# Patient Record
Sex: MALE | Race: WHITE | Employment: UNEMPLOYED | RURAL
[De-identification: names, ages, dates, MRNs, and addresses within clinical notes are randomized per-mention and may not be internally consistent; named-entity substitution may affect disease eponyms.]

---

## 2017-01-25 RX ORDER — CETIRIZINE HCL 10 MG
10 TABLET ORAL DAILY
Qty: 90 TAB | Refills: 1 | Status: SHIPPED | OUTPATIENT
Start: 2017-01-25

## 2017-03-08 RX ORDER — PROMETHAZINE HYDROCHLORIDE 25 MG/1
TABLET ORAL
Qty: 30 TAB | Refills: 2 | Status: SHIPPED | OUTPATIENT
Start: 2017-03-08 | End: 2017-06-01 | Stop reason: SDUPTHER

## 2017-06-01 RX ORDER — PROMETHAZINE HYDROCHLORIDE 25 MG/1
TABLET ORAL
Qty: 30 TAB | Refills: 2 | Status: SHIPPED | OUTPATIENT
Start: 2017-06-01 | End: 2017-08-24 | Stop reason: SDUPTHER

## 2017-08-21 RX ORDER — LEVOTHYROXINE SODIUM 100 UG/1
100 TABLET ORAL
Qty: 14 TAB | Refills: 0 | Status: SHIPPED | OUTPATIENT
Start: 2017-08-21 | End: 2017-09-01 | Stop reason: SDUPTHER

## 2017-08-25 ENCOUNTER — OFFICE VISIT (OUTPATIENT)
Dept: FAMILY MEDICINE CLINIC | Age: 57
End: 2017-08-25

## 2017-08-25 VITALS
WEIGHT: 150 LBS | DIASTOLIC BLOOD PRESSURE: 69 MMHG | BODY MASS INDEX: 23.54 KG/M2 | SYSTOLIC BLOOD PRESSURE: 131 MMHG | HEIGHT: 67 IN | OXYGEN SATURATION: 100 % | TEMPERATURE: 97.7 F | HEART RATE: 49 BPM | RESPIRATION RATE: 18 BRPM

## 2017-08-25 DIAGNOSIS — R79.89 HIGH SERUM LOW DENSITY LIPOPROTEIN (LDL) CHOLESTEROL: ICD-10-CM

## 2017-08-25 DIAGNOSIS — E03.9 ACQUIRED HYPOTHYROIDISM: Primary | ICD-10-CM

## 2017-08-25 DIAGNOSIS — R73.03 PREDIABETES: ICD-10-CM

## 2017-08-25 DIAGNOSIS — R00.1 BRADYCARDIA: ICD-10-CM

## 2017-08-25 NOTE — PROGRESS NOTES
Subjective:      Melissa Mcneill is a 62 y.o. male here for hypothyroidism follow up and labs. Hypothyroidism: currently on levothyroxine 100 mcg daily before breakfast. Reports that he feels \"tired all the time\". Health Maintenance:  · Colonoscopy: has had previously   · Hepatitis C screening (born between [birth year] and 1965): 3/10/16   · Tetanus: unknown   · Pneumovax: has not had   · PSA (males): 2015, normal   · Influenza vaccine: recommend that he receive this flu season      Current Outpatient Prescriptions   Medication Sig Dispense Refill    levothyroxine (SYNTHROID) 100 mcg tablet Take 1 Tab by mouth Daily (before breakfast). Appointment required. 14 Tab 0    promethazine (PHENERGAN) 25 mg tablet TAKE 1 TABLET BY MOUTH NIGHTLY. 30 Tab 2    cetirizine (ZYRTEC) 10 mg tablet Take 1 Tab by mouth daily. 90 Tab 1    omega-3 fatty acids-vitamin e (FISH OIL) 1,000 mg cap Take 1 Cap by mouth daily.  90 Cap 3       No Known Allergies      Past Medical History:   Diagnosis Date    GERD (gastroesophageal reflux disease)     Thyroid disease        Social History   Substance Use Topics    Smoking status: Former Smoker     Quit date: 3/2/1985    Smokeless tobacco: Never Used    Alcohol use No        Review of Systems  Constitutional: negative for fevers and chills  Eyes: negative for visual disturbance and irritation  Ears, nose, mouth, throat, and face: negative for nasal congestion and sore throat  Respiratory: negative for cough, sputum or dyspnea on exertion  Cardiovascular: negative for chest pain, chest pressure/discomfort, palpitations, irregular heart beats, lower extremity edema  Gastrointestinal: positive for nausea, negative for vomiting, change in bowel habits and abdominal pain  Genitourinary:negative for frequency, dysuria and hematuria  Musculoskeletal:negative for myalgias and arthralgias  Neurological: negative for headaches, dizziness and paresthesia     Objective:     Visit Vitals    /69 (BP 1 Location: Right arm, BP Patient Position: Sitting)    Pulse (!) 49    Temp 97.7 °F (36.5 °C) (Oral)    Resp 18    Ht 5' 7.25\" (1.708 m)    Wt 150 lb (68 kg)    SpO2 100%    BMI 23.32 kg/m2      General appearance - alert, well appearing, and in no distress  Eyes - pupils equal and reactive, extraocular eye movements intact, sclera anicteric  Oropharyngx - mucous membranes moist, pharynx normal without lesions  Neck - supple, no significant adenopathy, thyroid exam: thyroid is normal in size without nodules or tenderness  Chest - clear to auscultation, no wheezes, rales or rhonchi, symmetric air entry, no tachypnea, retractions or cyanosis  Heart - bradycardic, regular rhythm, normal S1, S2, no murmurs, rubs, clicks or gallops  Neurological - alert, oriented, normal speech, no focal findings or movement disorder noted  Extremities - peripheral pulses normal, no pedal edema, no clubbing or cyanosis    Assessment/Plan:   Alexander Sahu is a 62 y.o. male seen for:     1. Acquired hypothyroidism: check TSH, continue with current dose of levothyroxine at this time.   - TSH 3RD GENERATION    2. Bradycardia: asymptomatic. EKG with sinus bradycardia. Monitor.    - AMB POC EKG ROUTINE W/ 12 LEADS, INTER & REP    3. Prediabetes: check A1c.   - HEMOGLOBIN A1C WITH EAG    4. High serum low density lipoprotein (LDL) cholesterol: on fish oil supplement. Check lipid panel.   - LIPID PANEL    I have discussed the diagnosis with the patient and the intended plan as seen in the above orders. The patient has received an after-visit summary and questions were answered concerning future plans. I have discussed medication side effects and warnings with the patient as well. Patient verbalizes understanding of plan of care and denies further questions or concerns at this time. Informed patient to return to the office if symptoms worsen or if new symptoms arise.     Follow-up Disposition:  Return in about 6 months (around 2/25/2018) for follow up or sooner as needed.

## 2017-08-25 NOTE — MR AVS SNAPSHOT
Visit Information Date & Time Provider Department Dept. Phone Encounter #  
 8/25/2017  8:00 AM Zahra Mendez Bryant Tani [phone] [id number] Follow-up Instructions Return in about 6 months (around 2/25/2018) for follow up or sooner as needed. Upcoming Health Maintenance Date Due FOBT Q 1 YEAR AGE 50-75 5/17/2010 INFLUENZA AGE 9 TO ADULT 9/1/2017* DTaP/Tdap/Td series (2 - Td) 8/25/2027 *Topic was postponed. The date shown is not the original due date. Allergies as of 8/25/2017  Review Complete On: 8/25/2017 By: Zahra Mendez MD  
 No Known Allergies Current Immunizations  Never Reviewed No immunizations on file. Not reviewed this visit You Were Diagnosed With   
  
 Codes Comments Acquired hypothyroidism    -  Primary ICD-10-CM: E03.9 ICD-9-CM: 244.9 Bradycardia     ICD-10-CM: R00.1 ICD-9-CM: 427.89 Prediabetes     ICD-10-CM: R73.03 
ICD-9-CM: 790.29 High serum low density lipoprotein (LDL) cholesterol     ICD-10-CM: R79.89 ICD-9-CM: 790.99 Vitals BP Pulse Temp Resp Height(growth percentile) Weight(growth percentile) 131/69 (BP 1 Location: Right arm, BP Patient Position: Sitting) (!) 49 97.7 °F (36.5 °C) (Oral) 18 5' 7.25\" (1.708 m) 150 lb (68 kg) SpO2 BMI Smoking Status 100% 23.32 kg/m2 Former Smoker BMI and BSA Data Body Mass Index Body Surface Area  
 23.32 kg/m 2 1.8 m 2 Preferred Pharmacy Pharmacy Name Phone [id number] [place] [address] [phone] Your Updated Medication List  
  
   
This list is accurate as of: 8/25/17  8:40 AM.  Always use your most recent med list.  
  
  
  
  
 cetirizine 10 mg tablet Commonly known as:  ZyrTEC Take 1 Tab by mouth daily. levothyroxine 100 mcg tablet Commonly known as:  SYNTHROID Take 1 Tab by mouth Daily (before breakfast). Appointment required. omega-3 fatty acids-vitamin e 1,000 mg Cap Commonly known as:  FISH OIL Take 1 Cap by mouth daily. promethazine 25 mg tablet Commonly known as:  PHENERGAN  
TAKE 1 TABLET BY MOUTH NIGHTLY. We Performed the Following AMB POC EKG ROUTINE W/ 12 LEADS, INTER & REP [52521 CPT(R)] HEMOGLOBIN A1C WITH EAG [38564 CPT(R)] LIPID PANEL [61863 CPT(R)] TSH 3RD GENERATION [76331 CPT(R)] Follow-up Instructions Return in about 6 months (around 2/25/2018) for follow up or sooner as needed. Patient Instructions A Healthy Lifestyle: Care Instructions Your Care Instructions A healthy lifestyle can help you feel good, stay at a healthy weight, and have plenty of energy for both work and play. A healthy lifestyle is something you can share with your whole family. A healthy lifestyle also can lower your risk for serious health problems, such as high blood pressure, heart disease, and diabetes. You can follow a few steps listed below to improve your health and the health of your family. Follow-up care is a key part of your treatment and safety. Be sure to make and go to all appointments, and call your doctor if you are having problems. Its also a good idea to know your test results and keep a list of the medicines you take. How can you care for yourself at home? · Do not eat too much sugar, fat, or fast foods. You can still have dessert and treats now and then. The goal is moderation. · Start small to improve your eating habits. Pay attention to portion sizes, drink less juice and soda pop, and eat more fruits and vegetables. ¨ Eat a healthy amount of food. A 3-ounce serving of meat, for example, is about the size of a deck of cards. Fill the rest of your plate with vegetables and whole grains. ¨ Limit the amount of soda and sports drinks you have every day. Drink more water when you are thirsty. ¨ Eat at least 5 servings of fruits and vegetables every day. It may seem like a lot, but it is not hard to reach this goal. A serving or helping is 1 piece of fruit, 1 cup of vegetables, or 2 cups of leafy, raw vegetables. Have an apple or some carrot sticks as an afternoon snack instead of a candy bar. Try to have fruits and/or vegetables at every meal. 
· Make exercise part of your daily routine. You may want to start with simple activities, such as walking, bicycling, or slow swimming. Try to be active 30 to 60 minutes every day. You do not need to do all 30 to 60 minutes all at once. For example, you can exercise 3 times a day for 10 or 20 minutes. Moderate exercise is safe for most people, but it is always a good idea to talk to your doctor before starting an exercise program. 
· Keep moving. [employer] the lawn, work in the garden, or tomoguides. Take the stairs instead of the elevator at work. · If you smoke, quit. People who smoke have an increased risk for heart attack, stroke, cancer, and other lung illnesses. Quitting is hard, but there are ways to boost your chance of quitting tobacco for good. ¨ Use nicotine gum, patches, or lozenges. ¨ Ask your doctor about stop-smoking programs and medicines. ¨ Keep trying. In addition to reducing your risk of diseases in the future, you will notice some benefits soon after you stop using tobacco. If you have shortness of breath or asthma symptoms, they will likely get better within a few weeks after you quit. · Limit how much alcohol you drink. Moderate amounts of alcohol (up to 2 drinks a day for men, 1 drink a day for women) are okay. But drinking too much can lead to liver problems, high blood pressure, and other health problems. Family health If you have a family, there are many things you can do together to improve your health. · Eat meals together as a family as often as possible. · Eat healthy foods.  This includes fruits, vegetables, lean meats and dairy, and whole grains. · Include your family in your fitness plan. Most people think of activities such as jogging or tennis as the way to fitness, but there are many ways you and your family can be more active. Anything that makes you breathe hard and gets your heart pumping is exercise. Here are some tips: 
¨ Walk to do errands or to take your child to school or the bus. ¨ Go for a family bike ride after dinner instead of watching TV. Where can you learn more? Go to http://alondra-dawood.info/. Enter J674 in the search box to learn more about \"A Healthy Lifestyle: Care Instructions. \" Current as of: July 26, 2016 Content Version: 11.3 © 4277-8527 [employer] Care instructions adapted under license by [employer] (which disclaims liability or warranty for this information). If you have questions about a medical condition or this instruction, always ask your healthcare professional. [place] [address] any warranty or liability for your use of this information. Introducing [facility]! Amita Sadler introduces [employer] patient portal. Now you can access parts of your medical record, email your doctor's office, and request medication refills online. 1. In your internet browser, go to [email or web address] 2. Click on the First Time User? Click Here link in the Sign In box. You will see the New Member Sign Up page. 3. Enter your CopperKey Access Code exactly as it appears below. You will not need to use this code after youve completed the sign-up process. If you do not sign up before the expiration date, you must request a new code. · [employer] Access Code: [email or web address] Expires: 11/23/2017  8:39 AM 
 
4. Enter the last four digits of your Social Security Number (xxxx) and Date of Birth (mm/dd/yyyy) as indicated and click Submit. You will be taken to the next sign-up page. 5. Create a Memorado ID. This will be your Memorado login ID and cannot be changed, so think of one that is secure and easy to remember. 6. Create a Memorado password. You can change your password at any time. 7. Enter your Password Reset Question and Answer. This can be used at a later time if you forget your password. 8. Enter your e-mail address. You will receive e-mail notification when new information is available in [address] 9. Click Sign Up. You can now view and download portions of your medical record. 10. Click the Download Summary menu link to download a portable copy of your medical information. If you have questions, please visit the Frequently Asked Questions section of the Memorado website. Remember, [employer]orado is NOT to be used for urgent needs. For medical emergencies, dial 911. Now available from your iPhone and Android! Please provide this summary of care documentation to your next provider. Your primary care clinician is listed as Keren Noel. If you have any questions after today's visit, please call [phone].

## 2017-08-25 NOTE — PROGRESS NOTES
Identified pt with two pt identifiers(name and ). Chief Complaint   Patient presents with    Annual Wellness Visit    Labs     fasting for [address] Maintenance Due   Topic    DTaP/Tdap/Td series (1 - Tdap)    FOBT Q 1 YEAR AGE 50-75     INFLUENZA AGE 9 TO ADULT        Wt Readings from Last 3 Encounters:   17 150 lb (68 kg)   16 158 lb (71.7 kg)   16 154 lb (69.9 kg)     Temp Readings from Last 3 Encounters:   17 97.7 °F (36.5 °C) (Oral)   16 98 °F (36.7 °C) (Oral)   16 98.3 °F (36.8 °C) (Oral)     BP Readings from Last 3 Encounters:   17 131/69   16 128/72   16 137/76     Pulse Readings from Last 3 Encounters:   17 (!) 49   16 (!) 56   16 60         Learning Assessment:  :     Learning Assessment 3/8/2016   PRIMARY LEARNER Patient   HIGHEST LEVEL OF EDUCATION - PRIMARY LEARNER  GRADUATED HIGH SCHOOL OR GED   BARRIERS PRIMARY LEARNER NONE   CO-LEARNER CAREGIVER No   PRIMARY LANGUAGE ENGLISH   LEARNER PREFERENCE PRIMARY DEMONSTRATION   ANSWERED BY patient   RELATIONSHIP SELF       Depression Screening:  :     PHQ over the last two weeks 2016   Little interest or pleasure in doing things Not at all   Feeling down, depressed or hopeless Not at all   Total Score PHQ 2 0       Fall Risk Assessment:  :     No flowsheet data found. Abuse Screening:  :     No flowsheet data found. Coordination of Care Questionnaire:  :     1) Have you been to an emergency room, urgent care clinic since your last visit? [facility] [address] [place] [address] [facility]  Hospitalized since your last visit? no             2) Have you seen or consulted any other health care providers outside of [address] [place] [address] since your last visit? no  (Include any pap smears or colon screenings in this section.)    3) Do you have an Advance Directive on file? no  Are you interested in receiving information about Advance Directives?  no    Reviewed record in preparation for visit and have obtained necessary documentation. Medication reconciliation up to date and corrected with patient at this time.

## 2017-08-26 LAB
CHOLEST SERPL-MCNC: 196 MG/DL (ref 100–199)
EST. AVERAGE GLUCOSE BLD GHB EST-MCNC: 117 MG/DL
HBA1C MFR BLD: 5.7 % (ref 4.8–5.6)
HDLC SERPL-MCNC: 72 MG/DL
INTERPRETATION, 910389: NORMAL
LDLC SERPL CALC-MCNC: 112 MG/DL (ref 0–99)
TRIGL SERPL-MCNC: 59 MG/DL (ref 0–149)
TSH SERPL DL<=0.005 MIU/L-ACNC: 3.35 UIU/ML (ref 0.45–4.5)
VLDLC SERPL CALC-MCNC: 12 MG/DL (ref 5–40)

## 2017-08-28 RX ORDER — PROMETHAZINE HYDROCHLORIDE 25 MG/1
TABLET ORAL
Qty: 30 TAB | Refills: 2 | Status: SHIPPED | OUTPATIENT
Start: 2017-08-28 | End: 2017-08-30 | Stop reason: SDUPTHER

## 2017-08-28 NOTE — TELEPHONE ENCOUNTER
[facility] at [address] [place] [address] is calling to check on status of refill request.

## 2017-09-01 RX ORDER — PROMETHAZINE HYDROCHLORIDE 25 MG/1
TABLET ORAL
Qty: 30 TAB | Refills: 5 | Status: SHIPPED | OUTPATIENT
Start: 2017-09-01 | End: 2018-05-27 | Stop reason: SDUPTHER

## 2017-09-01 RX ORDER — LEVOTHYROXINE SODIUM 100 UG/1
100 TABLET ORAL
Qty: 30 TAB | Refills: 5 | Status: SHIPPED | OUTPATIENT
Start: 2017-09-01 | End: 2018-01-02 | Stop reason: SDUPTHER

## 2017-09-01 NOTE — TELEPHONE ENCOUNTER
Patient called and labs discussed. No change in current medications. Follow up in 6 months. Patient verbalizes understanding. Requested Prescriptions     Signed Prescriptions Disp Refills    promethazine (PHENERGAN) 25 mg tablet 30 Tab 5     Sig: TAKE 1 TABLET BY MOUTH NIGHTLY. Authorizing Provider: Alexus Sinclair levothyroxine (SYNTHROID) 100 mcg tablet 30 Tab 5     Sig: Take 1 Tab by mouth Daily (before breakfast).      Authorizing Provider: Jay Lopez

## 2017-09-08 ENCOUNTER — OFFICE VISIT (OUTPATIENT)
Dept: FAMILY MEDICINE CLINIC | Age: 57
End: 2017-09-08

## 2017-09-08 VITALS
SYSTOLIC BLOOD PRESSURE: 142 MMHG | DIASTOLIC BLOOD PRESSURE: 72 MMHG | HEIGHT: 67 IN | WEIGHT: 153 LBS | HEART RATE: 55 BPM | BODY MASS INDEX: 24.01 KG/M2 | TEMPERATURE: 97.7 F | RESPIRATION RATE: 18 BRPM | OXYGEN SATURATION: 98 %

## 2017-09-08 DIAGNOSIS — H66.001 ACUTE SUPPURATIVE OTITIS MEDIA OF RIGHT EAR WITHOUT SPONTANEOUS RUPTURE OF TYMPANIC MEMBRANE, RECURRENCE NOT SPECIFIED: Primary | ICD-10-CM

## 2017-09-08 DIAGNOSIS — R03.0 ELEVATED BLOOD PRESSURE READING IN OFFICE WITHOUT DIAGNOSIS OF HYPERTENSION: ICD-10-CM

## 2017-09-08 RX ORDER — AMOXICILLIN 875 MG/1
875 TABLET, FILM COATED ORAL 2 TIMES DAILY
Qty: 20 TAB | Refills: 0 | Status: SHIPPED | OUTPATIENT
Start: 2017-09-08 | End: 2017-09-11 | Stop reason: ALTCHOICE

## 2017-09-08 NOTE — PATIENT INSTRUCTIONS
Ear Infection (Otitis Media): Care Instructions  Your Care Instructions    An ear infection may start with a cold and affect the middle ear (otitis media). It can hurt a lot. Most ear infections clear up on their own in a couple of days. Most often you will not need antibiotics. This is because many ear infections are caused by a virus. Antibiotics don't work against a virus. Regular doses of pain medicines are the best way to reduce your fever and help you feel better. Follow-up care is a key part of your treatment and safety. Be sure to make and go to all appointments, and call your doctor if you are having problems. It's also a good idea to know your test results and keep a list of the medicines you take. How can you care for yourself at home? · Take pain medicines exactly as directed. ¨ If the doctor gave you a prescription medicine for pain, take it as prescribed. ¨ If you are not taking a prescription pain medicine, take an over-the-counter medicine, such as acetaminophen (Tylenol), ibuprofen (Advil, Motrin), or naproxen (Aleve). Read and follow all instructions on the label. ¨ Do not take two or more pain medicines at the same time unless the doctor told you to. Many pain medicines have acetaminophen, which is Tylenol. Too much acetaminophen (Tylenol) can be harmful. · Plan to take a full dose of pain reliever before bedtime. Getting enough sleep will help you get better. · Try a warm, moist washcloth on the ear. It may help relieve pain. · If your doctor prescribed antibiotics, take them as directed. Do not stop taking them just because you feel better. You need to take the full course of antibiotics. When should you call for help? Call your doctor now or seek immediate medical care if:  · You have new or increasing ear pain. · You have new or increasing pus or blood draining from your ear. · You have a fever with a stiff neck or a severe headache.   Watch closely for changes in your health, and be sure to contact your doctor if:  · You have new or worse symptoms. · You are not getting better after taking an antibiotic for 2 days. Where can you learn more? Go to http://alondra-dawood.info/. Enter N845 in the search box to learn more about \"Ear Infection (Otitis Media): Care Instructions. \"  Current as of: May 4, 2017  Content Version: 11.3  © 3243-9002 [employer]. Care instructions adapted under license by [employer] (which disclaims liability or warranty for this information). If you have questions about a medical condition or this instruction, always ask your healthcare professional. [address] any warranty or liability for your use of this information.

## 2017-09-08 NOTE — PROGRESS NOTES
Identified pt with two pt identifiers(name and ). Chief Complaint   Patient presents with    Ear Fullness     started a couple days ago    Cough    Sore Throat        Health Maintenance Due   Topic    FOBT Q 1 YEAR AGE 50-75     INFLUENZA AGE 9 TO ADULT        Wt Readings from Last 3 Encounters:   17 153 lb (69.4 kg)   17 150 lb (68 kg)   16 158 lb (71.7 kg)     Temp Readings from Last 3 Encounters:   17 97.7 °F (36.5 °C) (Oral)   17 97.7 °F (36.5 °C) (Oral)   16 98 °F (36.7 °C) (Oral)     BP Readings from Last 3 Encounters:   17 142/72   17 131/69   16 128/72     Pulse Readings from Last 3 Encounters:   17 (!) 55   17 (!) 49   16 (!) 56         Learning Assessment:  :     Learning Assessment 3/8/2016   PRIMARY LEARNER Patient   HIGHEST LEVEL OF EDUCATION - PRIMARY LEARNER  GRADUATED HIGH SCHOOL OR GED   BARRIERS PRIMARY LEARNER NONE   CO-LEARNER CAREGIVER No   PRIMARY LANGUAGE ENGLISH   LEARNER PREFERENCE PRIMARY DEMONSTRATION   ANSWERED BY patient   RELATIONSHIP SELF       Depression Screening:  :     PHQ over the last two weeks 2016   Little interest or pleasure in doing things Not at all   Feeling down, depressed or hopeless Not at all   Total Score PHQ 2 0       Fall Risk Assessment:  :     No flowsheet data found. Abuse Screening:  :     No flowsheet data found. Coordination of Care Questionnaire:  :     1) Have you been to an emergency room, urgent care clinic since your last visit? no   Hospitalized since your last visit? no             2) Have you seen or consulted any other health care providers outside of [address] [place] [address] since your last visit? no  (Include any pap smears or colon screenings in this section.)    3) Do you have an Advance Directive on file? no  Are you interested in receiving information about Advance Directives?  no    Reviewed record in preparation for visit and have obtained necessary documentation. Medication reconciliation up to date and corrected with patient at this time.

## 2017-09-08 NOTE — MR AVS SNAPSHOT
Visit Information Date & Time Provider Department Dept. Phone Encounter #  
 9/8/2017 10:15 AM Harvey SinclairBryant [phone] [id number] Follow-up Instructions Return if symptoms worsen or fail to improve. Upcoming Health Maintenance Date Due FOBT Q 1 YEAR AGE 50-75 5/17/2010 INFLUENZA AGE 9 TO ADULT 8/1/2017 DTaP/Tdap/Td series (2 - Td) 8/25/2027 Allergies as of 9/8/2017  Review Complete On: 9/8/2017 By: Harvey Sinclair MD  
 No Known Allergies Current Immunizations  Never Reviewed No immunizations on file. Not reviewed this visit You Were Diagnosed With   
  
 Codes Comments Acute suppurative otitis media of right ear without spontaneous rupture of tympanic membrane, recurrence not specified    -  Primary ICD-10-CM: H66.001 ICD-9-CM: 382.00 Elevated blood pressure reading in office without diagnosis of hypertension     ICD-10-CM: R03.0 ICD-9-CM: 796.2 Vitals BP Pulse Temp Resp Height(growth percentile) Weight(growth percentile) 142/72 (BP 1 Location: Left arm, BP Patient Position: Sitting) (!) 55 97.7 °F (36.5 °C) (Oral) 18 5' 7.25\" (1.708 m) 153 lb (69.4 kg) SpO2 BMI Smoking Status 98% 23.79 kg/m2 Former Smoker Vitals History BMI and BSA Data Body Mass Index Body Surface Area  
 23.79 kg/m 2 1.81 m 2 Preferred Pharmacy Pharmacy Name Phone [facility] #[id number] [facility], [address] [place] [address] [phone] Your Updated Medication List  
  
   
This list is accurate as of: 9/8/17 10:23 AM.  Always use your most recent med list.  
  
  
  
  
 amoxicillin 875 mg tablet Commonly known as:  AMOXIL Take 1 Tab by mouth two (2) times a day for 10 days. cetirizine 10 mg tablet Commonly known as:  ZyrTEC Take 1 Tab by mouth daily. levothyroxine 100 mcg tablet Commonly known as:  SYNTHROID  
 Take 1 Tab by mouth Daily (before breakfast). omega-3 fatty acids-vitamin e 1,000 mg Cap Commonly known as:  FISH OIL Take 1 Cap by mouth daily. promethazine 25 mg tablet Commonly known as:  PHENERGAN  
TAKE 1 TABLET BY MOUTH NIGHTLY. Prescriptions Sent to Pharmacy Refills  
 amoxicillin (AMOXIL) 875 mg tablet 0 Sig: Take 1 Tab by mouth two (2) times a day for 10 days. Class: Normal  
 [facility]: [facility]  #: [phone] Route: Oral  
  
Follow-up Instructions Return if symptoms worsen or fail to improve. Patient Instructions Ear Infection (Otitis Media): Care Instructions Your Care Instructions An ear infection may start with a cold and affect the middle ear (otitis media). It can hurt a lot. Most ear infections clear up on their own in a couple of days. Most often you will not need antibiotics. This is because many ear infections are caused by a virus. Antibiotics don't work against a virus. Regular doses of pain medicines are the best way to reduce your fever and help you feel better. Follow-up care is a key part of your treatment and safety. Be sure to make and go to all appointments, and call your doctor if you are having problems. It's also a good idea to know your test results and keep a list of the medicines you take. How can you care for yourself at home? · Take pain medicines exactly as directed. ¨ If the doctor gave you a prescription medicine for pain, take it as prescribed. ¨ If you are not taking a prescription pain medicine, take an over-the-counter medicine, such as acetaminophen (Tylenol), ibuprofen (Advil, Motrin), or naproxen (Aleve). Read and follow all instructions on the label. ¨ Do not take two or more pain medicines at the same time unless the doctor told you to.  Many pain medicines have acetaminophen, which is Tylenol. Too much acetaminophen (Tylenol) can be harmful. · Plan to take a full dose of pain reliever before bedtime. Getting enough sleep will help you get better. · Try a warm, moist washcloth on the ear. It may help relieve pain. · If your doctor prescribed antibiotics, take them as directed. Do not stop taking them just because you feel better. You need to take the full course of antibiotics. When should you call for help? Call your doctor now or seek immediate medical care if: 
· You have new or increasing ear pain. · You have new or increasing pus or blood draining from your ear. · You have a fever with a stiff neck or a severe headache. Watch closely for changes in your health, and be sure to contact your doctor if: 
· You have new or worse symptoms. · You are not getting better after taking an antibiotic for 2 days. Where can you learn more? Go to http://alondra-dawood.info/. Enter L811 in the search box to learn more about \"Ear Infection (Otitis Media): Care Instructions. \" Current as of: May 4, 2017 Content Version: 11.3 © 5735-8156 [employer]. Care instructions adapted under license by [employer] (which disclaims liability or warranty for this information). If you have questions about a medical condition or this instruction, always ask your healthcare professional. [address] any warranty or liability for your use of this information. Introducing [facility] & HEALTH SERVICES! [facility] introduces HealthSouk patient portal. Now you can access parts of your medical record, email your doctor's office, and request medication refills online. 1. In your internet browser, go to [email or web address] 2. Click on the First Time User? Click Here link in the Sign In box. You will see the New Member Sign Up page. 3. Enter your HealthSouk Access Code exactly as it appears below.  You will not need to use this code after youve completed the sign-up process. If you do not sign up before the expiration date, you must request a new code. · [employer] Access Code: [email or web address] Expires: 11/23/2017  8:39 AM 
 
4. Enter the last four digits of your Social Security Number (xxxx) and Date of Birth (mm/dd/yyyy) as indicated and click Submit. You will be taken to the next sign-up page. 5. Create a [employer] ID. This will be your [employer] login ID and cannot be changed, so think of one that is secure and easy to remember. 6. Create a [employer] password. You can change your password at any time. 7. Enter your Password Reset Question and Answer. This can be used at a later time if you forget your password. 8. Enter your e-mail address. You will receive e-mail notification when new information is available in [address]. 9. Click Sign Up. You can now view and download portions of your medical record. 10. Click the Download Summary menu link to download a portable copy of your medical information. If you have questions, please visit the Frequently Asked Questions section of the [employer] website. Remember, [employer] is NOT to be used for urgent needs. For medical emergencies, dial 911. Now available from your iPhone and Android! Please provide this summary of care documentation to your next provider. Your primary care clinician is listed as Amarjit Chadwick. If you have any questions after today's visit, please call [phone].

## 2017-09-08 NOTE — PROGRESS NOTES
Subjective:      Luís Waite is a 62 y.o. male here with complaint right ear pain and fullness over the past 2 days. Reports cough (productive of dark colored sputum, no associated wheezing or shortness of breath) and sore throat about 1 week ago which has not worsened. Left ear is starting to feel clogged as well. States that he \"overall feels terrible\". Assorciated with nasal congestion, nasal discharge, frontal headache. Denies fever, chills, ear drainage. No known sick contacts. Evaluation to date: none  Treatment to date: Advil       Current Outpatient Prescriptions   Medication Sig Dispense Refill    promethazine (PHENERGAN) 25 mg tablet TAKE 1 TABLET BY MOUTH NIGHTLY. 30 Tab 5    levothyroxine (SYNTHROID) 100 mcg tablet Take 1 Tab by mouth Daily (before breakfast). 30 Tab 5    cetirizine (ZYRTEC) 10 mg tablet Take 1 Tab by mouth daily. 90 Tab 1    omega-3 fatty acids-vitamin e (FISH OIL) 1,000 mg cap Take 1 Cap by mouth daily. 90 Cap 3       No Known Allergies      Past Medical History:   Diagnosis Date    GERD (gastroesophageal reflux disease)     Thyroid disease        Social History   Substance Use Topics    Smoking status: Former Smoker     Quit date: 3/2/1985    Smokeless tobacco: Never Used    Alcohol use No        Review of Systems  Pertinent items are noted in HPI.      Objective:     Vitals:    09/08/17 1008 09/08/17 1010   BP: (!) 150/94 142/72   Pulse: (!) 55    Resp: 18    Temp: 97.7 °F (36.5 °C)    TempSrc: Oral    SpO2: 98%    Weight: 153 lb (69.4 kg)    Height: 5' 7.25\" (1.708 m)            General appearance - alert, well appearing, and in no distress  Eyes - pupils equal and reactive, extraocular eye movements intact  Ears - right TM red, dull, bulging, left TM normal, external canals normal bilaterally   Nose - normal and patent, no erythema, discharge or polyps  Oropharyngx - mucous membranes moist, pharynx normal without lesions  Neck - supple, no significant adenopathy  Chest - clear to auscultation, no wheezes, rales or rhonchi, symmetric air entry, no tachypnea, retractions or cyanosis  Heart - normal rate, regular rhythm, normal S1, S2, no murmurs, rubs, clicks or gallops    Assessment/Plan:   Marie Willingham is a 62 y.o. male seen for:     1. Acute suppurative otitis media of right ear without spontaneous rupture of tympanic membrane, recurrence not specified  - amoxicillin (AMOXIL) 875 mg tablet; Take 1 Tab by mouth two (2) times a day for 10 days. Dispense: 20 Tab; Refill: 0  - use OTC acetaminophen and/or ibuprofen as needed for pain   - advised to call should there be no improvement in symptoms after 72 hours of antibiotic therapy    2. Elevated blood pressure reading in office without diagnosis of hypertension: elevated here today and previously has been normal; likely secondary to pain. Mr. Amadou Chacon has been given the following recommendations today due to his elevated BP reading: rescreen BP within a minimum of 3-6 months. I have discussed the diagnosis with the patient and the intended plan as seen in the above orders. The patient has received an after-visit summary and questions were answered concerning future plans. I have discussed medication side effects and warnings with the patient as well. Patient verbalizes understanding of plan of care and denies further questions or concerns at this time. Informed patient to return to the office if symptoms worsen or if new symptoms arise. Follow-up Disposition:  Return if symptoms worsen or fail to improve.

## 2017-09-11 ENCOUNTER — TELEPHONE (OUTPATIENT)
Dept: FAMILY MEDICINE CLINIC | Age: 57
End: 2017-09-11

## 2017-09-11 RX ORDER — AMOXICILLIN AND CLAVULANATE POTASSIUM 875; 125 MG/1; MG/1
1 TABLET, FILM COATED ORAL EVERY 12 HOURS
Qty: 20 TAB | Refills: 0 | Status: SHIPPED | OUTPATIENT
Start: 2017-09-11 | End: 2017-09-21

## 2017-09-11 NOTE — TELEPHONE ENCOUNTER
Patient evaluated on 9/8/17 and diagnosed with otitis media for which he was prescribed amoxicillin. He reports that he has not had any significant improvement in his right ear pain. No fevers reported. As he has not had significant improvement with 48-72 hours of antibiotic therapy, will change therapy to Augmentin 875-125 mg. Advised to take medication with food to lessen GI upset. If no improvement noted, will have him return for re-evaluation. Patient verbalizes understanding. Requested Prescriptions     Signed Prescriptions Disp Refills    amoxicillin-clavulanate (AUGMENTIN) 875-125 mg per tablet 20 Tab 0     Sig: Take 1 Tab by mouth every twelve (12) hours for 10 days.      Authorizing Provider: Dania Hyatt

## 2017-09-11 NOTE — TELEPHONE ENCOUNTER
Pt came in on 9/8/17 and stated the medication given to him is not helping and would like something else called into [facility]

## 2017-09-22 ENCOUNTER — OFFICE VISIT (OUTPATIENT)
Dept: FAMILY MEDICINE CLINIC | Age: 57
End: 2017-09-22

## 2017-09-22 VITALS
BODY MASS INDEX: 23.23 KG/M2 | WEIGHT: 148 LBS | DIASTOLIC BLOOD PRESSURE: 72 MMHG | TEMPERATURE: 98 F | HEART RATE: 62 BPM | RESPIRATION RATE: 18 BRPM | SYSTOLIC BLOOD PRESSURE: 127 MMHG | HEIGHT: 67 IN | OXYGEN SATURATION: 98 %

## 2017-09-22 DIAGNOSIS — H65.91 OTITIS MEDIA WITH EFFUSION, RIGHT: Primary | ICD-10-CM

## 2017-09-22 RX ORDER — PREDNISONE 20 MG/1
TABLET ORAL
Qty: 20 TAB | Refills: 0 | Status: SHIPPED | OUTPATIENT
Start: 2017-09-22 | End: 2017-11-24 | Stop reason: ALTCHOICE

## 2017-09-22 NOTE — PATIENT INSTRUCTIONS
Middle Ear Fluid: Care Instructions  Your Care Instructions    Fluid often builds up inside the ear during a cold or allergies. Usually the fluid drains away, but sometimes a small tube in the ear, called the eustachian tube, stays blocked for months. Symptoms of fluid buildup may include:  · Popping, ringing, or a feeling of fullness or pressure in the ear. · Trouble hearing. · Balance problems and dizziness. In most cases, you can treat yourself at home. Follow-up care is a key part of your treatment and safety. Be sure to make and go to all appointments, and call your doctor if you are having problems. It's also a good idea to know your test results and keep a list of the medicines you take. How can you care for yourself at home? · In most cases, the fluid clears up within a few months without treatment. You may need more tests if the fluid does not clear up after 3 months. · If your doctor prescribed antibiotics, take them as directed. Do not stop taking them just because you feel better. You need to take the full course of antibiotics. When should you call for help? Call your doctor now or seek immediate medical care if:  · You have symptoms of infection, such as:  ¨ Increased pain, swelling, warmth, or redness. ¨ Pus draining from the area. ¨ A fever. Watch closely for changes in your health, and be sure to contact your doctor if:  · You notice changes in hearing. · You do not get better as expected. Where can you learn more? Go to http://alondra-dawood.info/. Enter V582 in the search box to learn more about \"Middle Ear Fluid: Care Instructions. \"  Current as of: July 29, 2016  Content Version: 11.3  © 5891-7249 [employer]. Care instructions adapted under license by [employer] (which disclaims liability or warranty for this information).  If you have questions about a medical condition or this instruction, always ask your healthcare professional. [employer] disclaims any warranty or liability for your use of this information.

## 2017-09-22 NOTE — PROGRESS NOTES
Identified pt with two pt identifiers(name and ). Chief Complaint   Patient presents with    Ear Fullness     drops didnt help    Hearing Problem        Health Maintenance Due   Topic    FOBT Q 1 YEAR AGE 50-75     INFLUENZA AGE 9 TO ADULT        Wt Readings from Last 3 Encounters:   17 148 lb (67.1 kg)   17 153 lb (69.4 kg)   17 150 lb (68 kg)     Temp Readings from Last 3 Encounters:   17 98 °F (36.7 °C) (Oral)   17 97.7 °F (36.5 °C) (Oral)   17 97.7 °F (36.5 °C) (Oral)     BP Readings from Last 3 Encounters:   17 127/72   17 142/72   17 131/69     Pulse Readings from Last 3 Encounters:   17 62   17 (!) 55   17 (!) 49         Learning Assessment:  :     Learning Assessment 3/8/2016   PRIMARY LEARNER Patient   HIGHEST LEVEL OF EDUCATION - PRIMARY LEARNER  GRADUATED HIGH SCHOOL OR GED   BARRIERS PRIMARY LEARNER NONE   CO-LEARNER CAREGIVER No   PRIMARY LANGUAGE ENGLISH   LEARNER PREFERENCE PRIMARY DEMONSTRATION   ANSWERED BY patient   RELATIONSHIP SELF       Depression Screening:  :     PHQ over the last two weeks 2016   Little interest or pleasure in doing things Not at all   Feeling down, depressed or hopeless Not at all   Total Score PHQ 2 0       Fall Risk Assessment:  :     No flowsheet data found. Abuse Screening:  :     No flowsheet data found. Coordination of Care Questionnaire:  :     1) Have you been to an emergency room, urgent care clinic since your last visit? no   Hospitalized since your last visit? no             2) Have you seen or consulted any other health care providers outside of [address] [place] [address] since your last visit? no  (Include any pap smears or colon screenings in this section.)    3) Do you have an Advance Directive on file? no  Are you interested in receiving information about Advance Directives?  no    Reviewed record in preparation for visit and have obtained necessary documentation. Medication reconciliation up to date and corrected with patient at this time.

## 2017-09-22 NOTE — MR AVS SNAPSHOT
Visit Information Date & Time Provider Department Dept. Phone Encounter #  
 9/22/2017  3:30 PM Dayanara Bryant Hunt [phone] [id number] Follow-up Instructions Return if symptoms worsen or fail to improve. Upcoming Health Maintenance Date Due FOBT Q 1 YEAR AGE 50-75 5/17/2010 INFLUENZA AGE 9 TO ADULT 8/1/2017 DTaP/Tdap/Td series (2 - Td) 8/25/2027 Allergies as of 9/22/2017  Review Complete On: 9/22/2017 By: Dayanara Hunt MD  
 No Known Allergies Current Immunizations  Never Reviewed No immunizations on file. Not reviewed this visit You Were Diagnosed With   
  
 Codes Comments Otitis media with effusion, right    -  Primary ICD-10-CM: H65.91 
ICD-9-CM: 381. 4 Vitals BP Pulse Temp Resp Height(growth percentile) Weight(growth percentile) 127/72 (BP 1 Location: Right arm, BP Patient Position: Sitting) 62 98 °F (36.7 °C) (Oral) 18 5' 7.25\" (1.708 m) 148 lb (67.1 kg) SpO2 BMI Smoking Status 98% 23.01 kg/m2 Former Smoker Vitals History BMI and BSA Data Body Mass Index Body Surface Area 23.01 kg/m 2 1.78 m 2 Preferred Pharmacy [facility]acy Name Phone [id number] [address] [phone] Your Updated Medication List  
  
   
This list is accurate as of: 9/22/17  4:19 PM.  Always use your most recent med list.  
  
  
  
  
 cetirizine 10 mg tablet Commonly known as:  ZyrTEC Take 1 Tab by mouth daily. levothyroxine 100 mcg tablet Commonly known as:  SYNTHROID Take 1 Tab by mouth Daily (before breakfast). omega-3 fatty acids-vitamin e 1,000 mg Cap Commonly known as:  FISH OIL Take 1 Cap by mouth daily. predniSONE 20 mg tablet Commonly known as:  Laila Norlander Take 60 mg daily for 3 days, then 40 mg daily for 3 days, then 20 mg daily for 3 days, then 10 mg by daily for 3 days. promethazine 25 mg tablet Commonly known as:  PHENERGAN  
TAKE 1 TABLET BY MOUTH NIGHTLY. Prescriptions Sent to Pharmacy Refills  
 predniSONE (DELTASONE) 20 mg tablet 0 Sig: Take 60 mg daily for 3 days, then 40 mg daily for 3 days, then 20 mg daily for 3 days, then 10 mg by daily for 3 days. Class: Normal  
 [facility]: [facility] [address]  #: [phone] Follow-up Instructions Return if symptoms worsen or fail to improve. Patient Instructions Middle Ear Fluid: Care Instructions Your Care Instructions Fluid often builds up inside the ear during a cold or allergies. Usually the fluid drains away, but sometimes a small tube in the ear, called the eustachian tube, stays blocked for months. Symptoms of fluid buildup may include: · Popping, ringing, or a feeling of fullness or pressure in the ear. · Trouble hearing. · Balance problems and dizziness. In most cases, you can treat yourself at home. Follow-up care is a key part of your treatment and safety. Be sure to make and go to all appointments, and call your doctor if you are having problems. It's also a good idea to know your test results and keep a list of the medicines you take. How can you care for yourself at home? · In most cases, the fluid clears up within a few months without treatment. You may need more tests if the fluid does not clear up after 3 months. · If your doctor prescribed antibiotics, take them as directed. Do not stop taking them just because you feel better. You need to take the full course of antibiotics. When should you call for help? Call your doctor now or seek immediate medical care if: 
· You have symptoms of infection, such as: 
¨ Increased pain, swelling, warmth, or redness. ¨ Pus draining from the area. ¨ A fever. Watch closely for changes in your health, and be sure to contact your doctor if: 
· You notice changes in hearing. · You do not get better as expected. Where can you learn more? Go to http://alondra-dawood.info/. Enter L922 in the search box to learn more about \"Middle Ear Fluid: Care Instructions. \" Current as of: July 29, 2016 Content Version: 11.3 © 0239-5772 [employer]. Care instructions adapted under license by [employer] (which disclaims liability or warranty for this information). If you have questions about a medical condition or this instruction, always ask your healthcare professional. [address] any warranty or liability for your use of this information. Introducing [facility] & HEALTH SERVICES! Aura Schreiber introduces One [employer] patient portal. Now you can access parts of your medical record, email your doctor's office, and request medication refills online. 1. In your internet browser, go to [email or web address] 2. Click on the First Time User? Click Here link in the Sign In box. You will see the New Member Sign Up page. 3. Enter your One Moja Access Code exactly as it appears below. You will not need to use this code after youve completed the sign-up process. If you do not sign up before the expiration date, you must request a new code. · One [employer] Access Code: [email or web address] Expires: 11/23/2017  8:39 AM 
 
4. Enter the last four digits of your Social Security Number (xxxx) and Date of Birth (mm/dd/yyyy) as indicated and click Submit. You will be taken to the next sign-up page. 5. Create a [email or web address] ID. This will be your One Moja login ID and cannot be changed, so think of one that is secure and easy to remember. 6. Create a One Moja password. You can change your password at any time. 7. Enter your Password Reset Question and Answer. This can be used at a later time if you forget your password. 8. Enter your e-mail address. You will receive e-mail notification when new information is available in [address] 9. Click Sign Up. You can now view and download portions of your medical record. 10. Click the Download Summary menu link to download a portable copy of your medical information. If you have questions, please visit the Frequently Asked Questions section of the Brit[employer] website. Remember, Brit[employer] is NOT to be used for urgent needs. For medical emergencies, dial 911. Now available from your iPhone and Android! Please provide this summary of care documentation to your next provider. Your primary care clinician is listed as Derick Cervantes. If you have any questions after today's visit, please call [phone].

## 2017-09-22 NOTE — PROGRESS NOTES
Subjective:      Yimi Mayberry is a 62 y.o. male here with continued pain in the right ear associated with decreased hearing. Evaluated on 9/8/17 for right otitis media for which he was started on amoxicillin and therapy changed to Augmentin x 10 days which he has completed. States that he has had no improvement in his right ear pain. Denies fever, chills, nasal symptoms, tinnitus. No reported ear trauma. Current Outpatient Prescriptions   Medication Sig Dispense Refill    promethazine (PHENERGAN) 25 mg tablet TAKE 1 TABLET BY MOUTH NIGHTLY. 30 Tab 5    levothyroxine (SYNTHROID) 100 mcg tablet Take 1 Tab by mouth Daily (before breakfast). 30 Tab 5    cetirizine (ZYRTEC) 10 mg tablet Take 1 Tab by mouth daily. 90 Tab 1    omega-3 fatty acids-vitamin e (FISH OIL) 1,000 mg cap Take 1 Cap by mouth daily. 90 Cap 3       No Known Allergies      Past Medical History:   Diagnosis Date    GERD (gastroesophageal reflux disease)     Thyroid disease        Social History   Substance Use Topics    Smoking status: Former Smoker     Quit date: 3/2/1985    Smokeless tobacco: Never Used    Alcohol use No        Review of Systems  Pertinent items are noted in HPI.      Objective:     Visit Vitals    /72 (BP 1 Location: Right arm, BP Patient Position: Sitting)  Comment: auto cuff    Pulse 62    Temp 98 °F (36.7 °C) (Oral)    Resp 18    Ht 5' 7.25\" (1.708 m)    Wt 148 lb (67.1 kg)    SpO2 98%    BMI 23.01 kg/m2      General appearance - alert, well appearing, and in no distress  Eyes - pupils equal and reactive, extraocular eye movements intact, sclera anicteric   Ears - right TM with clear middle ear fluid, left TM normal   Oropharyngx - mucous membranes moist, pharynx normal without lesions  Neck - supple, no significant adenopathy  Chest - clear to auscultation, no wheezes, rales or rhonchi, symmetric air entry, no tachypnea, retractions or cyanosis  Heart - normal rate, regular rhythm, normal S1, S2, no murmurs, rubs, clicks or gallops    Assessment/Plan:   Benjamín Rogers is a 62 y.o. male seen for:     1. Otitis media with effusion, right: feel as though infection has cleared and now effusion remains. Still remains symptomatic. Will treat with prednisone taper as below. If no improvement noted, discussed ENT evaluation. - predniSONE (DELTASONE) 20 mg tablet; Take 60 mg daily for 3 days, then 40 mg daily for 3 days, then 20 mg daily for 3 days, then 10 mg by daily for 3 days. Dispense: 20 Tab; Refill: 0    I have discussed the diagnosis with the patient and the intended plan as seen in the above orders. The patient has received an after-visit summary and questions were answered concerning future plans. I have discussed medication side effects and warnings with the patient as well. Patient verbalizes understanding of plan of care and denies further questions or concerns at this time. Informed patient to return to the office if symptoms worsen or if new symptoms arise. Follow-up Disposition:  Return if symptoms worsen or fail to improve.

## 2017-11-24 ENCOUNTER — OFFICE VISIT (OUTPATIENT)
Dept: FAMILY MEDICINE CLINIC | Age: 57
End: 2017-11-24

## 2017-11-24 VITALS
RESPIRATION RATE: 18 BRPM | WEIGHT: 145 LBS | DIASTOLIC BLOOD PRESSURE: 64 MMHG | SYSTOLIC BLOOD PRESSURE: 118 MMHG | TEMPERATURE: 98 F | OXYGEN SATURATION: 98 % | HEIGHT: 67 IN | BODY MASS INDEX: 22.76 KG/M2 | HEART RATE: 78 BPM

## 2017-11-24 DIAGNOSIS — L24.7 PLANT IRRITANT CONTACT DERMATITIS: Primary | ICD-10-CM

## 2017-11-24 RX ORDER — PREDNISONE 20 MG/1
TABLET ORAL
Qty: 30 TAB | Refills: 0 | Status: SHIPPED | OUTPATIENT
Start: 2017-11-24 | End: 2018-12-13 | Stop reason: ALTCHOICE

## 2017-11-24 NOTE — MR AVS SNAPSHOT
Visit Information Date & Time Provider Department Dept. Phone Encounter #  
 11/24/2017 10:00 AM Shree BaBryant [phone] [id number] Follow-up Instructions Return if symptoms worsen or fail to improve. Upcoming Health Maintenance Date Due FOBT Q 1 YEAR AGE 50-75 5/17/2010 DTaP/Tdap/Td series (2 - Td) 8/25/2027 Allergies as of 11/24/2017  Review Complete On: 11/24/2017 By: Shree Ba MD  
 No Known Allergies Current Immunizations  Never Reviewed No immunizations on file. Not reviewed this visit You Were Diagnosed With   
  
 Codes Comments Plant irritant contact dermatitis    -  Primary ICD-10-CM: L24.7 ICD-9-CM: 692.6 Vitals BP Pulse Temp Resp Height(growth percentile) Weight(growth percentile)  
 118/64 (BP 1 Location: Right arm, BP Patient Position: Sitting) 78 98 °F (36.7 °C) (Oral) 18 5' 7.25\" (1.708 m) 145 lb (65.8 kg) SpO2 BMI Smoking Status 98% 22.54 kg/m2 Former Smoker BMI and BSA Data Body Mass Index Body Surface Area  
 22.54 kg/m 2 1.77 m 2 Preferred Pharmacy Pharmacy Name Phone [id number] [address] [phone] Your Updated Medication List  
  
   
This list is accurate as of: 11/24/17 10:30 AM.  Always use your most recent med list.  
  
  
  
  
 cetirizine 10 mg tablet Commonly known as:  ZyrTEC Take 1 Tab by mouth daily. levothyroxine 100 mcg tablet Commonly known as:  SYNTHROID Take 1 Tab by mouth Daily (before breakfast). omega-3 fatty acids-vitamin e 1,000 mg Cap Commonly known as:  FISH OIL Take 1 Cap by mouth daily. predniSONE 20 mg tablet Commonly known as:  Jorgito Goldening Take 60 mg (3 tabs) daily x 5 days, then 40 mg (2 tabs) daily x 5 days, then 20 mg (1 tab) daily x 5 days. Take with food. promethazine 25 mg tablet Commonly known as:  PHENERGAN  
 TAKE 1 TABLET BY MOUTH NIGHTLY. Prescriptions Sent to Pharmacy Refills  
 predniSONE (DELTASONE) 20 mg tablet 0 Sig: Take 60 mg (3 tabs) daily x 5 days, then 40 mg (2 tabs) daily x 5 days, then 20 mg (1 tab) daily x 5 days. Take with food. Class: Normal  
 Pharmacy: [facility] [address]  #: [phone] Follow-up Instructions Return if symptoms worsen or fail to improve. Patient Instructions Poison [place], [place], and [place]: Care Instructions Your Care Instructions Poison ivy, poison oak, and poison sumac are plants that can cause a skin rash upon contact. The red, itchy rash often shows up in lines or streaks and may cause fluid-filled blisters or large, raised hives. The rash is caused by an allergic reaction to an oil in poison ivy, oak, and sumac. The rash may occur when you touch the plant or when you touch clothing, pet fur, sporting gear, gardening tools, or other objects that have come in contact with one of these plants. You cannot catch or spread the rash, even if you touch it or the blister fluid, because the plant oil will already have been absorbed or washed off the skin. The rash may seem to be spreading, but either it is still developing from earlier contact or you have touched something that still has the plant oil on it. Follow-up care is a key part of your treatment and safety. Be sure to make and go to all appointments, and call your doctor if you are having problems. It's also a good idea to know your test results and keep a list of the medicines you take. How can you care for yourself at home? · If your doctor prescribed a cream, use it as directed. If your doctor prescribed medicine, take it exactly as prescribed. Call your doctor if you think you are having a problem with your medicine. · Use cold, wet cloths to reduce itching. · Keep cool, and stay out of the sun. · Leave the rash open to the air. · Wash all clothing or other things that may have come in contact with the plant oil. · Avoid most lotions and ointments until the rash heals. Calamine lotion may help relieve symptoms of a plant rash. Use it 3 or 4 times a day. To prevent poison ivy exposure If you know that you will be near poison ivy, oak, or sumac, you can try these options: · Use a product designed to help prevent plant oil from getting on the skin. These products, such as Ivy X Pre-Contact Skin Solution, come in lotions, sprays, or towelettes. You put the product on your skin right before you go outdoors. · If you did not use a preventive product and you have had contact with plant oil, clean it off your skin as soon as possible. Use a product such as Tecnu Original Outdoor Skin Cleanser. These products can also be used to clean plant oil from clothing or tools. When should you call for help? Call your doctor now or seek immediate medical care if: 
? · Your rash gets worse, and you start to feel bad and have a fever, a stiff neck, nausea, and vomiting. ? · You have signs of infection, such as: 
¨ Increased pain, swelling, warmth, or redness. ¨ Red streaks leading from the rash. ¨ Pus draining from the rash. ¨ A fever. ? Watch closely for changes in your health, and be sure to contact your doctor if: 
? · You have new blisters or bruises, or the rash spreads and looks like a sunburn. ? · The rash gets worse, or it comes back after nearly disappearing. ? · You think a medicine you are using is making your rash worse. ? · Your rash does not clear up after 1 to 2 weeks of home treatment. ? · You have joint aches or body aches with your rash. Where can you learn more? Go to http://alondra-dawood.info/. Enter L259 in the search box to learn more about \"Poison [place], [place], and [place]: Care Instructions. \" Current as of: October 13, 2016 Content Version: 11.4 © 0307-4323 Healthwise, Incorporated. Care instructions adapted under license by [employer] (which disclaims liability or warranty for this information). If you have questions about a medical condition or this instruction, always ask your healthcare professional. [address] any warranty or liability for your use of this information. Introducing [facility]! [employer] introduces CREAT patient portal. Now you can access parts of your medical record, email your doctor's office, and request medication refills online. 1. In your internet browser, go to [email or web address] 2. Click on the First Time User? Click Here link in the Sign In box. You will see the New Member Sign Up page. 3. Enter your CREAT Access Code exactly as it appears below. You will not need to use this code after youve completed the sign-up process. If you do not sign up before the expiration date, you must request a new code. · CREAT Access Code: [email or web address] Expires: 2/22/2018 10:30 AM 
 
4. Enter the last four digits of your Social Security Number (xxxx) and Date of Birth (mm/dd/yyyy) as indicated and click Submit. You will be taken to the next sign-up page. 5. Create a CREAT ID. This will be your CREAT login ID and cannot be changed, so think of one that is secure and easy to remember. 6. Create a CREAT password. You can change your password at any time. 7. Enter your Password Reset Question and Answer. This can be used at a later time if you forget your password. 8. Enter your e-mail address. You will receive e-mail notification when new information is available in [address] 9. Click Sign Up. You can now view and download portions of your medical record. 10. Click the Download Summary menu link to download a portable copy of your medical information.  
 
If you have questions, please visit the Frequently Asked Questions section of the Daily Pic. Remember, [employer] is NOT to be used for urgent needs. For medical emergencies, dial 911. Now available from your iPhone and Android! Please provide this summary of care documentation to your next provider. Your primary care clinician is listed as Trey Miller. If you have any questions after today's visit, please call [phone].

## 2017-11-24 NOTE — PROGRESS NOTES
Subjective:      Austyn Navarro is a 62 y.o. male here with complaint of rash secondary to poison oak. Onset was 5 days ago after is was working around his garage. Denies change in soaps, lotion, detergents. No fever or chills. Associated with mild itch. No treatment to date. Current Outpatient Prescriptions   Medication Sig Dispense Refill    promethazine (PHENERGAN) 25 mg tablet TAKE 1 TABLET BY MOUTH NIGHTLY. 30 Tab 5    levothyroxine (SYNTHROID) 100 mcg tablet Take 1 Tab by mouth Daily (before breakfast). 30 Tab 5    cetirizine (ZYRTEC) 10 mg tablet Take 1 Tab by mouth daily. 90 Tab 1    omega-3 fatty acids-vitamin e (FISH OIL) 1,000 mg cap Take 1 Cap by mouth daily. 90 Cap 3       No Known Allergies      Past Medical History:   Diagnosis Date    GERD (gastroesophageal reflux disease)     Thyroid disease        Social History   Substance Use Topics    Smoking status: Former Smoker     Quit date: 3/2/1985    Smokeless tobacco: Never Used    Alcohol use No        Review of Systems  Pertinent items are noted in HPI. Objective:     Visit Vitals    /64 (BP 1 Location: Right arm, BP Patient Position: Sitting)  Comment: manual    Pulse 78    Temp 98 °F (36.7 °C) (Oral)    Resp 18    Ht 5' 7.25\" (1.708 m)    Wt 145 lb (65.8 kg)    SpO2 98%    BMI 22.54 kg/m2      General appearance - alert, well appearing, and in no distress  Chest - clear to auscultation, no wheezes, rales or rhonchi, symmetric air entry, no tachypnea, retractions or cyanosis  Heart - normal rate, regular rhythm, normal S1, S2, no murmurs, rubs, clicks or gallops  Neurological - alert, oriented, normal speech, no focal findings or movement disorder noted  Skin- erythematous vesicular rash noted on the left hand, right forearm, and anterior chest, 2 erythematous papular lesions noted on the nose     Assessment/Plan:   Austyn Navarro is a 62 y.o. male seen for:     1.  Plant irritant contact dermatitis  - predniSONE (DELTASONE) 20 mg tablet; Take 60 mg (3 tabs) daily x 5 days, then 40 mg (2 tabs) daily x 5 days, then 20 mg (1 tab) daily x 5 days. Take with food. Dispense: 30 Tab; Refill: 0  - may use oral antihistamine of choice for itching    I have discussed the diagnosis with the patient and the intended plan as seen in the above orders. The patient has received an after-visit summary and questions were answered concerning future plans. I have discussed medication side effects and warnings with the patient as well. Patient verbalizes understanding of plan of care and denies further questions or concerns at this time. Informed patient to return to the office if symptoms worsen or if new symptoms arise. Follow-up Disposition:  Return if symptoms worsen or fail to improve.

## 2017-11-24 NOTE — PROGRESS NOTES
Identified pt with two pt identifiers(name and ). Chief Complaint   Patient presents with    Poison Ivy/Poison Oak/Poison [place] Exposure     has small places on nose hands and arms        Health Maintenance Due   Topic    FOBT Q 1 YEAR AGE 50-75     Influenza Age 5 to Adult        Wt Readings from Last 3 Encounters:   17 145 lb (65.8 kg)   17 148 lb (67.1 kg)   17 153 lb (69.4 kg)     Temp Readings from Last 3 Encounters:   17 98 °F (36.7 °C) (Oral)   17 98 °F (36.7 °C) (Oral)   17 97.7 °F (36.5 °C) (Oral)     BP Readings from Last 3 Encounters:   17 118/64   17 127/72   17 142/72     Pulse Readings from Last 3 Encounters:   17 78   17 62   17 (!) 55         Learning Assessment:  :     Learning Assessment 3/8/2016   PRIMARY LEARNER Patient   HIGHEST LEVEL OF EDUCATION - PRIMARY LEARNER  GRADUATED HIGH SCHOOL OR GED   BARRIERS PRIMARY LEARNER NONE   CO-LEARNER CAREGIVER No   PRIMARY LANGUAGE ENGLISH   LEARNER PREFERENCE PRIMARY DEMONSTRATION   ANSWERED BY patient   RELATIONSHIP SELF       Depression Screening:  :     PHQ over the last two weeks 2016   Little interest or pleasure in doing things Not at all   Feeling down, depressed or hopeless Not at all   Total Score PHQ 2 0       Fall Risk Assessment:  :     No flowsheet data found. Abuse Screening:  :     No flowsheet data found. Coordination of Care Questionnaire:  :     1) Have you been to an emergency room, urgent care clinic since your last visit? no   Hospitalized since your last visit? no             2) Have you seen or consulted any other health care providers outside of [address] [place] [address] since your last visit? no  (Include any pap smears or colon screenings in this section.)    3) Do you have an Advance Directive on file? no  Are you interested in receiving information about Advance Directives?  no    Reviewed record in preparation for visit and have obtained necessary documentation. Medication reconciliation up to date and corrected with patient at this time.

## 2017-11-24 NOTE — PATIENT INSTRUCTIONS
Poison [place], [place], and [place]: Care Instructions  Your Care Instructions    Poison ivy, poison oak, and poison sumac are plants that can cause a skin rash upon contact. The red, itchy rash often shows up in lines or streaks and may cause fluid-filled blisters or large, raised hives. The rash is caused by an allergic reaction to an oil in poison ivy, oak, and sumac. The rash may occur when you touch the plant or when you touch clothing, pet fur, sporting gear, gardening tools, or other objects that have come in contact with one of these plants. You cannot catch or spread the rash, even if you touch it or the blister fluid, because the plant oil will already have been absorbed or washed off the skin. The rash may seem to be spreading, but either it is still developing from earlier contact or you have touched something that still has the plant oil on it. Follow-up care is a key part of your treatment and safety. Be sure to make and go to all appointments, and call your doctor if you are having problems. It's also a good idea to know your test results and keep a list of the medicines you take. How can you care for yourself at home? · If your doctor prescribed a cream, use it as directed. If your doctor prescribed medicine, take it exactly as prescribed. Call your doctor if you think you are having a problem with your medicine. · Use cold, wet cloths to reduce itching. · Keep cool, and stay out of the sun. · Leave the rash open to the air. · Wash all clothing or other things that may have come in contact with the plant oil. · Avoid most lotions and ointments until the rash heals. Calamine lotion may help relieve symptoms of a plant rash. Use it 3 or 4 times a day. To prevent poison ivy exposure  If you know that you will be near poison ivy, oak, or sumac, you can try these options:  · Use a product designed to help prevent plant oil from getting on the skin.  These products, such as Ivy X Pre-Contact Skin Solution, come in lotions, sprays, or towelettes. You put the product on your skin right before you go outdoors. · If you did not use a preventive product and you have had contact with plant oil, clean it off your skin as soon as possible. Use a product such as Tecnu Original Outdoor Skin Cleanser. These products can also be used to clean plant oil from clothing or tools. When should you call for help? Call your doctor now or seek immediate medical care if:  ? · Your rash gets worse, and you start to feel bad and have a fever, a stiff neck, nausea, and vomiting. ? · You have signs of infection, such as:  ¨ Increased pain, swelling, warmth, or redness. ¨ Red streaks leading from the rash. ¨ Pus draining from the rash. ¨ A fever. ? Watch closely for changes in your health, and be sure to contact your doctor if:  ? · You have new blisters or bruises, or the rash spreads and looks like a sunburn. ? · The rash gets worse, or it comes back after nearly disappearing. ? · You think a medicine you are using is making your rash worse. ? · Your rash does not clear up after 1 to 2 weeks of home treatment. ? · You have joint aches or body aches with your rash. Where can you learn more? Go to http://alondra-dawood.info/. Enter G596 in the search box to learn more about \"Poison [place], [place], and [place]: Care Instructions. \"  Current as of: October 13, 2016  Content Version: 11.4  © 2090-8422 [employer]. Care instructions adapted under license by [employer] (which disclaims liability or warranty for this information). If you have questions about a medical condition or this instruction, always ask your healthcare professional. [place] [address] any warranty or liability for your use of this information.

## 2018-01-02 RX ORDER — LEVOTHYROXINE SODIUM 100 UG/1
100 TABLET ORAL
Qty: 30 TAB | Refills: 5 | Status: SHIPPED | OUTPATIENT
Start: 2018-01-02 | End: 2018-01-04 | Stop reason: SDUPTHER

## 2018-01-04 RX ORDER — LEVOTHYROXINE SODIUM 100 UG/1
100 TABLET ORAL
Qty: 90 TAB | Refills: 1 | Status: SHIPPED | OUTPATIENT
Start: 2018-01-04 | End: 2018-12-13 | Stop reason: SDUPTHER

## 2018-11-28 RX ORDER — PROMETHAZINE HYDROCHLORIDE 25 MG/1
TABLET ORAL
Qty: 30 TAB | Refills: 4 | Status: SHIPPED | OUTPATIENT
Start: 2018-11-28 | End: 2019-05-06 | Stop reason: SDUPTHER

## 2018-12-13 ENCOUNTER — OFFICE VISIT (OUTPATIENT)
Dept: FAMILY MEDICINE CLINIC | Age: 58
End: 2018-12-13

## 2018-12-13 VITALS
TEMPERATURE: 98.5 F | HEART RATE: 93 BPM | DIASTOLIC BLOOD PRESSURE: 78 MMHG | SYSTOLIC BLOOD PRESSURE: 140 MMHG | RESPIRATION RATE: 16 BRPM | HEIGHT: 67 IN | OXYGEN SATURATION: 99 % | WEIGHT: 159 LBS | BODY MASS INDEX: 24.96 KG/M2

## 2018-12-13 DIAGNOSIS — E03.9 ACQUIRED HYPOTHYROIDISM: ICD-10-CM

## 2018-12-13 DIAGNOSIS — R10.32 ACUTE ABDOMINAL PAIN IN LEFT LOWER QUADRANT: Primary | ICD-10-CM

## 2018-12-13 LAB
BILIRUB UR QL STRIP: ABNORMAL
GLUCOSE UR-MCNC: NEGATIVE MG/DL
KETONES P FAST UR STRIP-MCNC: ABNORMAL MG/DL
PH UR STRIP: 5.5 [PH] (ref 4.6–8)
PROT UR QL STRIP: ABNORMAL
SP GR UR STRIP: 1.03 (ref 1–1.03)
UA UROBILINOGEN AMB POC: ABNORMAL (ref 0.2–1)
URINALYSIS CLARITY POC: CLEAR
URINALYSIS COLOR POC: YELLOW
URINE BLOOD POC: NEGATIVE
URINE LEUKOCYTES POC: NEGATIVE
URINE NITRITES POC: NEGATIVE

## 2018-12-13 RX ORDER — CIPROFLOXACIN 500 MG/1
500 TABLET ORAL 2 TIMES DAILY
Qty: 14 TAB | Refills: 0 | Status: SHIPPED | OUTPATIENT
Start: 2018-12-13 | End: 2018-12-20

## 2018-12-13 RX ORDER — LEVOTHYROXINE SODIUM 100 UG/1
TABLET ORAL
Qty: 30 TAB | Refills: 3 | OUTPATIENT
Start: 2018-12-13

## 2018-12-13 RX ORDER — LEVOTHYROXINE SODIUM 100 UG/1
100 TABLET ORAL
Qty: 90 TAB | Refills: 1 | Status: SHIPPED | OUTPATIENT
Start: 2018-12-13 | End: 2019-09-23 | Stop reason: SDUPTHER

## 2018-12-13 RX ORDER — METRONIDAZOLE 500 MG/1
500 TABLET ORAL 3 TIMES DAILY
Qty: 21 TAB | Refills: 0 | Status: SHIPPED | OUTPATIENT
Start: 2018-12-13 | End: 2018-12-20

## 2018-12-13 NOTE — PROGRESS NOTES
Subjective:      Kriss Boone is a 62 y.o. male here with complaint of lower abdominal pain. Started yesterday at 4 AM. Pain initially sharp and stabbing in quality, non-radiating. Pain has improved today, but left lower abdomen is tender to the touch. Associated with low grade temps of 99 F, nausea without vomiting, and overall not feeling well. Appetite has been decreased. Denies change in bowel habits, hematochezia, melena, urinary symptoms, penile symptoms. No known h/o gastrointestinal disease. He has had colonoscopy (previous records show in  with Dr. Starr Meyer). Evaluation to date: none  Treatment to date: OTC analgesic and Nyquil     Hypothyroidism  Patient presents for evaluation of thyroid function. Symptoms consist of denies fatigue, weight changes, heat/cold intolerance, bowel/skin changes or CVS symptoms. The problem has been stable. Previous thyroid studies include TSH and total T4. The hypothyroidism is due to Hypothyroidism. He has missed some doses of levothyroxine. Current Outpatient Medications   Medication Sig Dispense Refill    promethazine (PHENERGAN) 25 mg tablet TAKE ONE TABLET BY MOUTH ONCE NIGHTLY 30 Tab 4    levothyroxine (SYNTHROID) 100 mcg tablet Take 1 Tab by mouth Daily (before breakfast). 90 Tab 1    cetirizine (ZYRTEC) 10 mg tablet Take 1 Tab by mouth daily. 80 Tab 1       No Known Allergies      Past Medical History:   Diagnosis Date    GERD (gastroesophageal reflux disease)     Thyroid disease        Social History     Tobacco Use    Smoking status: Former Smoker     Last attempt to quit: 3/2/1985     Years since quittin.8    Smokeless tobacco: Never Used   Substance Use Topics    Alcohol use: No     Alcohol/week: 0.0 oz        Review of Systems  Pertinent items are noted in HPI.      Objective:     Visit Vitals  /78 (BP 1 Location: Right arm, BP Patient Position: Sitting) Comment: Manual   Pulse 93   Temp 98.5 °F (36.9 °C) (Oral) Comment: .   Resp 16 Ht 5' 7.25\" (1.708 m)   Wt 159 lb (72.1 kg)   SpO2 99%   BMI 24.72 kg/m²      General appearance - alert, well appearing, and in no distress  Eyes - pupils equal and reactive, extraocular eye movements intact, sclera anicteric  Chest - clear to auscultation, no wheezes, rales or rhonchi, symmetric air entry, no tachypnea, retractions or cyanosis  Heart - normal rate, regular rhythm, normal S1, S2, no murmurs, rubs, clicks or gallops  Abdomen - soft, TTP in LLQ without rebound or guarding, normal bowel sounds     Recent Results (from the past 12 hour(s))   AMB POC URINALYSIS DIP STICK AUTO W/O MICRO    Collection Time: 12/13/18  2:09 PM   Result Value Ref Range    Color (UA POC) Yellow     Clarity (UA POC) Clear     Glucose (UA POC) Negative Negative    Bilirubin (UA POC) 2+ Negative    Ketones (UA POC) 2+ Negative    Specific gravity (UA POC) 1.030 1.001 - 1.035    Blood (UA POC) Negative Negative    pH (UA POC) 5.5 4.6 - 8.0    Protein (UA POC) 1+ Negative    Urobilinogen (UA POC) 1 mg/dL 0.2 - 1    Nitrites (UA POC) Negative Negative    Leukocyte esterase (UA POC) Negative Negative       Assessment/Plan:   Tom Saldana is a 62 y.o. male seen for:     1. Acute abdominal pain in left lower quadrant: Pain improved today. Given history and location, concern does exist for diverticulitis. No reported h/o of diverticulosis, will request previous colonoscopy report. UA and history not suggestive for cystitis. Check labs and empirically treat as below. ED precautions were provided to patient. - AMB POC URINALYSIS DIP STICK AUTO W/O MICRO  - CBC W/O DIFF  - METABOLIC PANEL, COMPREHENSIVE  - ciprofloxacin HCl (CIPRO) 500 mg tablet; Take 1 Tab by mouth two (2) times a day for 7 days. Dispense: 14 Tab; Refill: 0  - metroNIDAZOLE (FLAGYL) 500 mg tablet; Take 1 Tab by mouth three (3) times daily for 7 days. Dispense: 21 Tab; Refill: 0    2. Acquired hypothyroidism: stable, continue with current dose of levothyroxine. Check thyroid studies.   - TSH AND FREE T4  - levothyroxine (SYNTHROID) 100 mcg tablet; Take 1 Tab by mouth Daily (before breakfast). Dispense: 90 Tab; Refill: 1    I have discussed the diagnosis with the patient and the intended plan as seen in the above orders. The patient has received an after-visit summary and questions were answered concerning future plans. I have discussed medication side effects and warnings with the patient as well. Patient verbalizes understanding of plan of care and denies further questions or concerns at this time. Informed patient to return to the office if symptoms worsen or if new symptoms arise. Follow-up Disposition:  Return if symptoms worsen or fail to improve.

## 2018-12-13 NOTE — PROGRESS NOTES
Identified pt with two pt identifiers(name and ). Chief Complaint   Patient presents with    Groin Pain     bottom of abdomen meets top of groin area. Began hurting about 4 am yesterday morning and got steadily worse with a stabbing pain. today is tender to touch. Health Maintenance Due   Topic    FOBT Q 1 YEAR AGE 50-75        Wt Readings from Last 3 Encounters:   18 159 lb (72.1 kg)   17 145 lb (65.8 kg)   17 148 lb (67.1 kg)     Temp Readings from Last 3 Encounters:   18 98.5 °F (36.9 °C) (Oral)   17 98 °F (36.7 °C) (Oral)   17 98 °F (36.7 °C) (Oral)     BP Readings from Last 3 Encounters:   18 140/78   17 118/64   17 127/72     Pulse Readings from Last 3 Encounters:   18 93   17 78   17 62         Learning Assessment:  :     Learning Assessment 3/8/2016   PRIMARY LEARNER Patient   HIGHEST LEVEL OF EDUCATION - PRIMARY LEARNER  GRADUATED HIGH SCHOOL OR GED   BARRIERS PRIMARY LEARNER NONE   CO-LEARNER CAREGIVER No   PRIMARY LANGUAGE ENGLISH   LEARNER PREFERENCE PRIMARY DEMONSTRATION   ANSWERED BY patient   RELATIONSHIP SELF       Depression Screening:  :     PHQ over the last two weeks 2018   Little interest or pleasure in doing things Not at all   Feeling down, depressed, irritable, or hopeless Not at all   Total Score PHQ 2 0       Fall Risk Assessment:  :     No flowsheet data found. Abuse Screening:  :     Abuse Screening Questionnaire 2018   Do you ever feel afraid of your partner? N   Are you in a relationship with someone who physically or mentally threatens you? N   Is it safe for you to go home?  Y       Coordination of Care Questionnaire:  :     1) Have you been to an emergency room, urgent care clinic since your last visit? no   Hospitalized since your last visit? no             2) Have you seen or consulted any other health care providers outside of [address] [place] [address] since your last visit? no (Include any pap smears or colon screenings in this section.)    3) Do you have an Advance Directive on file? no  Are you interested in receiving information about Advance Directives? no    Reviewed record in preparation for visit and have obtained necessary documentation. Medication reconciliation up to date and corrected with patient at this time.

## 2018-12-13 NOTE — PATIENT INSTRUCTIONS

## 2018-12-14 LAB
ALBUMIN SERPL-MCNC: 4.6 G/DL (ref 3.5–5.5)
ALBUMIN/GLOB SERPL: 1.9 {RATIO} (ref 1.2–2.2)
ALP SERPL-CCNC: 68 IU/L (ref 39–117)
ALT SERPL-CCNC: 18 IU/L (ref 0–44)
AST SERPL-CCNC: 16 IU/L (ref 0–40)
BILIRUB SERPL-MCNC: 0.8 MG/DL (ref 0–1.2)
BUN SERPL-MCNC: 11 MG/DL (ref 6–24)
BUN/CREAT SERPL: 12 (ref 9–20)
CALCIUM SERPL-MCNC: 9.6 MG/DL (ref 8.7–10.2)
CHLORIDE SERPL-SCNC: 101 MMOL/L (ref 96–106)
CO2 SERPL-SCNC: 25 MMOL/L (ref 20–29)
CREAT SERPL-MCNC: 0.9 MG/DL (ref 0.76–1.27)
ERYTHROCYTE [DISTWIDTH] IN BLOOD BY AUTOMATED COUNT: 14 % (ref 12.3–15.4)
GLOBULIN SER CALC-MCNC: 2.4 G/DL (ref 1.5–4.5)
GLUCOSE SERPL-MCNC: 99 MG/DL (ref 65–99)
HCT VFR BLD AUTO: 43.7 % (ref 37.5–51)
HGB BLD-MCNC: 14.7 G/DL (ref 13–17.7)
MCH RBC QN AUTO: 30.6 PG (ref 26.6–33)
MCHC RBC AUTO-ENTMCNC: 33.6 G/DL (ref 31.5–35.7)
MCV RBC AUTO: 91 FL (ref 79–97)
PLATELET # BLD AUTO: 212 X10E3/UL (ref 150–379)
POTASSIUM SERPL-SCNC: 4.2 MMOL/L (ref 3.5–5.2)
PROT SERPL-MCNC: 7 G/DL (ref 6–8.5)
RBC # BLD AUTO: 4.81 X10E6/UL (ref 4.14–5.8)
SODIUM SERPL-SCNC: 142 MMOL/L (ref 134–144)
T4 FREE SERPL-MCNC: 1.21 NG/DL (ref 0.82–1.77)
TSH SERPL DL<=0.005 MIU/L-ACNC: 1.89 UIU/ML (ref 0.45–4.5)
WBC # BLD AUTO: 11.9 X10E3/UL (ref 3.4–10.8)

## 2019-05-07 RX ORDER — PROMETHAZINE HYDROCHLORIDE 25 MG/1
TABLET ORAL
Qty: 30 TAB | Refills: 3 | Status: SHIPPED | OUTPATIENT
Start: 2019-05-07 | End: 2019-09-12 | Stop reason: SDUPTHER

## 2019-09-12 RX ORDER — PROMETHAZINE HYDROCHLORIDE 25 MG/1
TABLET ORAL
Qty: 30 TAB | Refills: 2 | Status: SHIPPED | OUTPATIENT
Start: 2019-09-12

## 2019-09-23 DIAGNOSIS — E03.9 ACQUIRED HYPOTHYROIDISM: ICD-10-CM

## 2019-09-23 RX ORDER — LEVOTHYROXINE SODIUM 100 UG/1
TABLET ORAL
Qty: 90 TAB | Refills: 0 | Status: SHIPPED | OUTPATIENT
Start: 2019-09-23

## 2021-03-30 ENCOUNTER — HOSPITAL ENCOUNTER (EMERGENCY)
Age: 61
Discharge: HOME | End: 2021-03-30
Attending: EMERGENCY MEDICINE

## 2021-03-30 VITALS
TEMPERATURE: 97.7 F | BODY MASS INDEX: 24.25 KG/M2 | HEART RATE: 74 BPM | OXYGEN SATURATION: 99 % | WEIGHT: 160 LBS | SYSTOLIC BLOOD PRESSURE: 141 MMHG | RESPIRATION RATE: 16 BRPM | HEIGHT: 68 IN | DIASTOLIC BLOOD PRESSURE: 87 MMHG

## 2021-03-30 DIAGNOSIS — M79.642 PAIN OF LEFT HAND: Primary | ICD-10-CM

## 2021-03-30 LAB
ALBUMIN SERPL-MCNC: 3.4 G/DL (ref 3.5–5)
ALBUMIN/GLOB SERPL: 0.9 {RATIO} (ref 1.1–2.2)
ALP SERPL-CCNC: 65 U/L (ref 45–117)
ALT SERPL-CCNC: 17 U/L (ref 12–78)
ANION GAP SERPL CALC-SCNC: 2 MMOL/L (ref 5–15)
AST SERPL-CCNC: 11 U/L (ref 15–37)
BASOPHILS # BLD: 0 K/UL (ref 0–0.1)
BASOPHILS NFR BLD: 0 % (ref 0–1)
BILIRUB SERPL-MCNC: 0.6 MG/DL (ref 0.2–1)
BUN SERPL-MCNC: 17 MG/DL (ref 6–20)
BUN/CREAT SERPL: 19 (ref 12–20)
CALCIUM SERPL-MCNC: 8.7 MG/DL (ref 8.5–10.1)
CHLORIDE SERPL-SCNC: 104 MMOL/L (ref 97–108)
CO2 SERPL-SCNC: 30 MMOL/L (ref 21–32)
COMMENT, HOLDF: NORMAL
CREAT SERPL-MCNC: 0.89 MG/DL (ref 0.7–1.3)
DIFFERENTIAL METHOD BLD: ABNORMAL
EOSINOPHIL # BLD: 0.2 K/UL (ref 0–0.4)
EOSINOPHIL NFR BLD: 1 % (ref 0–7)
ERYTHROCYTE [DISTWIDTH] IN BLOOD BY AUTOMATED COUNT: 13.1 % (ref 11.5–14.5)
GLOBULIN SER CALC-MCNC: 3.8 G/DL (ref 2–4)
GLUCOSE SERPL-MCNC: 118 MG/DL (ref 65–100)
HCT VFR BLD AUTO: 44.5 % (ref 36.6–50.3)
HGB BLD-MCNC: 14.8 G/DL (ref 12.1–17)
IMM GRANULOCYTES # BLD AUTO: 0.1 K/UL (ref 0–0.04)
IMM GRANULOCYTES NFR BLD AUTO: 0 % (ref 0–0.5)
LYMPHOCYTES # BLD: 1.7 K/UL (ref 0.8–3.5)
LYMPHOCYTES NFR BLD: 13 % (ref 12–49)
MCH RBC QN AUTO: 30.3 PG (ref 26–34)
MCHC RBC AUTO-ENTMCNC: 33.3 G/DL (ref 30–36.5)
MCV RBC AUTO: 91.2 FL (ref 80–99)
MONOCYTES # BLD: 0.9 K/UL (ref 0–1)
MONOCYTES NFR BLD: 7 % (ref 5–13)
NEUTS SEG # BLD: 10.2 K/UL (ref 1.8–8)
NEUTS SEG NFR BLD: 79 % (ref 32–75)
NRBC # BLD: 0 K/UL (ref 0–0.01)
NRBC BLD-RTO: 0 PER 100 WBC
PLATELET # BLD AUTO: 227 K/UL (ref 150–400)
PMV BLD AUTO: 9.1 FL (ref 8.9–12.9)
POTASSIUM SERPL-SCNC: 4.1 MMOL/L (ref 3.5–5.1)
PROT SERPL-MCNC: 7.2 G/DL (ref 6.4–8.2)
RBC # BLD AUTO: 4.88 M/UL (ref 4.1–5.7)
SAMPLES BEING HELD,HOLD: NORMAL
SODIUM SERPL-SCNC: 136 MMOL/L (ref 136–145)
URATE SERPL-MCNC: 4.6 MG/DL (ref 3.5–7.2)
WBC # BLD AUTO: 13.1 K/UL (ref 4.1–11.1)

## 2021-03-30 PROCEDURE — 80053 COMPREHEN METABOLIC PANEL: CPT

## 2021-03-30 PROCEDURE — 85025 COMPLETE CBC W/AUTO DIFF WBC: CPT

## 2021-03-30 PROCEDURE — 84550 ASSAY OF BLOOD/URIC ACID: CPT

## 2021-03-30 PROCEDURE — 36415 COLL VENOUS BLD VENIPUNCTURE: CPT

## 2021-03-30 PROCEDURE — 99282 EMERGENCY DEPT VISIT SF MDM: CPT

## 2021-03-30 NOTE — ED PROVIDER NOTES
Mr. Gilbert Thomson is a 63yo male who presents to the ER with complaints of left hand pain. He said that his pain is been present for the last month. He says pain in his left wrist and hand. He is also had swelling in this area. He was seen by his primary care doctor. He was put on a course of oral steroids. His symptoms improved for several days but then there quickly recurred. He has seen the orthopedic doctor twice, upon referral by his PCP. He said that his symptoms improved again when he was on oral steroids. However, they again recurred. He had 2 injections in the office by the orthopedic surgeon. He had very minimal relief during that time. He was referred to a rheumatologist.  He said that he tried to call the rheumatologist but could not schedule appointment until the summer. Therefore, he came to the ER. He said that his hand hurts when he flexes his wrist and pulses hand open to a fist.  He said that he has not been able to work for last 2 weeks due to the pain. He denies fevers or chills. No nausea or vomiting. He denies any other complaints.            Past Medical History:   Diagnosis Date    GERD (gastroesophageal reflux disease)     Thyroid disease        Past Surgical History:   Procedure Laterality Date    HX ORTHOPAEDIC      Knee scope x2, CTR x2         Family History:   Problem Relation Age of Onset    Diabetes Brother        Social History     Socioeconomic History    Marital status:      Spouse name: Not on file    Number of children: Not on file    Years of education: Not on file    Highest education level: Not on file   Occupational History    Not on file   Social Needs    Financial resource strain: Not on file    Food insecurity     Worry: Not on file     Inability: Not on file    Transportation needs     Medical: Not on file     Non-medical: Not on file   Tobacco Use    Smoking status: Former Smoker     Quit date: 3/2/1985     Years since quittin.1    Smokeless tobacco: Never Used   Substance and Sexual Activity    Alcohol use: No     Alcohol/week: 0.0 standard drinks    Drug use: No    Sexual activity: Yes   Lifestyle    Physical activity     Days per week: Not on file     Minutes per session: Not on file    Stress: Not on file   Relationships    Social connections     Talks on phone: Not on file     Gets together: Not on file     Attends Restoration service: Not on file     Active member of club or organization: Not on file     Attends meetings of clubs or organizations: Not on file     Relationship status: Not on file    Intimate partner violence     Fear of current or ex partner: Not on file     Emotionally abused: Not on file     Physically abused: Not on file     Forced sexual activity: Not on file   Other Topics Concern    Not on file   Social History Narrative    Not on file         ALLERGIES: Patient has no known allergies. Review of Systems   Constitutional: Negative for chills and fever. HENT: Negative for rhinorrhea and sore throat. Respiratory: Negative for cough and shortness of breath. Cardiovascular: Negative for chest pain. Gastrointestinal: Negative for abdominal pain, diarrhea, nausea and vomiting. Genitourinary: Negative for dysuria and hematuria. Musculoskeletal:        Left wrist and hand pain   Skin: Negative for pallor and rash. Neurological: Negative for dizziness, weakness and light-headedness. All other systems reviewed and are negative.       Vitals:    03/30/21 0806   BP: (!) 141/87   Pulse: 74   Resp: 16   Temp: 97.7 °F (36.5 °C)   SpO2: 99%   Weight: 72.6 kg (160 lb)   Height: 5' 8\" (1.727 m)            Physical Exam     Const:  No acute distress, well developed, well nourished  Head:  Atraumatic, normocephalic  Eyes:  PERRL, conjunctiva normal, no scleral icterus  Neck:  Supple, trachea midline  Cardiovascular:  Regular rate  Resp:  No resp distress, no increased work of breathing  Abd: non-distended  :  Deferred  MSK: Mild pain with passive range of motion of the left wrist, and fingers of the hand, no erythema of the arm, active range of motion limited by pain, mild swelling to the right wrist and hand  Neuro:  Alert and oriented x3, no cranial nerve defect  Skin: No erythema of the left wrist or hand  Psych: normal mood and affect, behavior is normal, judgement and thought content is normal        MDM  Number of Diagnoses or Management Options     Amount and/or Complexity of Data Reviewed  Clinical lab tests: ordered and reviewed  Tests in the radiology section of CPT®: ordered and reviewed  Review and summarize past medical records: yes    Patient Progress  Patient progress: stable          Mr. Heraclio Ceballos is a 65yo male who presents to the ER with complaints of wrist/hand pain. Pt. Has seen his PCP and orthopedic doctor several times. He is trying to be set up with rheum. No signs of infection on exam.  Exam is not consistent with septic arthritis. Pt. Has an elevated WBC, however, he has been on two courses of steroids this month. Pt. To f/u with his PCP, ortho, and rheum or return to the ER with new or worsening sx.       Procedures

## 2021-03-30 NOTE — ED TRIAGE NOTES
Patient report chronic stiffness/pain to his left hand-  Reports on 3/4 he was started on steroids for the stiffness/swelling and pain for which he only got minimal relief. Pt reports 2 weeks of swelling and stiffness to his left hand- he reports he was seen at [facility] and given cortisone injections, a compression sleeve and a brace. Pt presents here with ongoing pain, swelling and stiffness. Pt denies any acute/new changes to his symptoms.

## 2021-08-14 ENCOUNTER — APPOINTMENT (OUTPATIENT)
Dept: GENERAL RADIOLOGY | Age: 61
DRG: 247 | End: 2021-08-14
Attending: EMERGENCY MEDICINE

## 2021-08-14 ENCOUNTER — HOSPITAL ENCOUNTER (INPATIENT)
Age: 61
LOS: 1 days | Discharge: LEFT BEFORE DISCHARGE | DRG: 247 | End: 2021-08-14
Attending: EMERGENCY MEDICINE | Admitting: INTERNAL MEDICINE

## 2021-08-14 ENCOUNTER — HOSPITAL ENCOUNTER (INPATIENT)
Age: 61
LOS: 3 days | Discharge: HOME | DRG: 247 | End: 2021-08-17
Attending: INTERNAL MEDICINE | Admitting: INTERNAL MEDICINE

## 2021-08-14 VITALS
HEIGHT: 68 IN | DIASTOLIC BLOOD PRESSURE: 108 MMHG | BODY MASS INDEX: 24.25 KG/M2 | SYSTOLIC BLOOD PRESSURE: 171 MMHG | WEIGHT: 160 LBS | OXYGEN SATURATION: 100 % | RESPIRATION RATE: 14 BRPM | HEART RATE: 59 BPM

## 2021-08-14 DIAGNOSIS — I21.3 ST ELEVATION MYOCARDIAL INFARCTION (STEMI), UNSPECIFIED ARTERY (HCC): ICD-10-CM

## 2021-08-14 DIAGNOSIS — I21.11 ST ELEVATION MYOCARDIAL INFARCTION INVOLVING RIGHT CORONARY ARTERY (HCC): ICD-10-CM

## 2021-08-14 DIAGNOSIS — I21.09 ST ELEVATION MYOCARDIAL INFARCTION (STEMI) OF ANTERIOR WALL (HCC): Primary | ICD-10-CM

## 2021-08-14 LAB
ACT BLD: 202 SECS (ref 79–138)
ACT BLD: 246 SECS (ref 79–138)
ACT BLD: 329 SECS (ref 79–138)
ALBUMIN SERPL-MCNC: 3.6 G/DL (ref 3.5–5)
ALBUMIN/GLOB SERPL: 1.2 {RATIO} (ref 1.1–2.2)
ALP SERPL-CCNC: 60 U/L (ref 45–117)
ALT SERPL-CCNC: 32 U/L (ref 12–78)
ANION GAP SERPL CALC-SCNC: 4 MMOL/L (ref 5–15)
AST SERPL-CCNC: 16 U/L (ref 15–37)
BASOPHILS # BLD: 0.1 K/UL (ref 0–0.1)
BASOPHILS NFR BLD: 1 % (ref 0–1)
BILIRUB SERPL-MCNC: 0.4 MG/DL (ref 0.2–1)
BUN SERPL-MCNC: 11 MG/DL (ref 6–20)
BUN/CREAT SERPL: 11 (ref 12–20)
CALCIUM SERPL-MCNC: 8.9 MG/DL (ref 8.5–10.1)
CHLORIDE SERPL-SCNC: 106 MMOL/L (ref 97–108)
CO2 SERPL-SCNC: 27 MMOL/L (ref 21–32)
COMMENT, HOLDF: NORMAL
CREAT SERPL-MCNC: 0.96 MG/DL (ref 0.7–1.3)
DIFFERENTIAL METHOD BLD: ABNORMAL
EOSINOPHIL # BLD: 0.6 K/UL (ref 0–0.4)
EOSINOPHIL NFR BLD: 4 % (ref 0–7)
ERYTHROCYTE [DISTWIDTH] IN BLOOD BY AUTOMATED COUNT: 13.8 % (ref 11.5–14.5)
GLOBULIN SER CALC-MCNC: 3.1 G/DL (ref 2–4)
GLUCOSE SERPL-MCNC: 164 MG/DL (ref 65–100)
HCT VFR BLD AUTO: 39.5 % (ref 36.6–50.3)
HGB BLD-MCNC: 13 G/DL (ref 12.1–17)
IMM GRANULOCYTES # BLD AUTO: 0.1 K/UL (ref 0–0.04)
IMM GRANULOCYTES NFR BLD AUTO: 1 % (ref 0–0.5)
LIPASE SERPL-CCNC: 99 U/L (ref 73–393)
LYMPHOCYTES # BLD: 5.8 K/UL (ref 0.8–3.5)
LYMPHOCYTES NFR BLD: 37 % (ref 12–49)
MAGNESIUM SERPL-MCNC: 2.1 MG/DL (ref 1.6–2.4)
MCH RBC QN AUTO: 31.2 PG (ref 26–34)
MCHC RBC AUTO-ENTMCNC: 32.9 G/DL (ref 30–36.5)
MCV RBC AUTO: 94.7 FL (ref 80–99)
MONOCYTES # BLD: 1.2 K/UL (ref 0–1)
MONOCYTES NFR BLD: 8 % (ref 5–13)
NEUTS SEG # BLD: 7.9 K/UL (ref 1.8–8)
NEUTS SEG NFR BLD: 49 % (ref 32–75)
NRBC # BLD: 0 K/UL (ref 0–0.01)
NRBC BLD-RTO: 0 PER 100 WBC
PLATELET # BLD AUTO: 294 K/UL (ref 150–400)
PMV BLD AUTO: 9.2 FL (ref 8.9–12.9)
POTASSIUM SERPL-SCNC: 3.7 MMOL/L (ref 3.5–5.1)
PROT SERPL-MCNC: 6.7 G/DL (ref 6.4–8.2)
RBC # BLD AUTO: 4.17 M/UL (ref 4.1–5.7)
SAMPLES BEING HELD,HOLD: NORMAL
SODIUM SERPL-SCNC: 137 MMOL/L (ref 136–145)
TROPONIN I BLD-MCNC: <0.04 NG/ML (ref 0–0.08)
TROPONIN I SERPL-MCNC: <0.05 NG/ML
WBC # BLD AUTO: 15.7 K/UL (ref 4.1–11.1)

## 2021-08-14 PROCEDURE — 99291 CRITICAL CARE FIRST HOUR: CPT | Performed by: INTERNAL MEDICINE

## 2021-08-14 PROCEDURE — 92941 PRQ TRLML REVSC TOT OCCL AMI: CPT | Performed by: INTERNAL MEDICINE

## 2021-08-14 PROCEDURE — 77030019569 HC BND COMPR RAD TERU -B: Performed by: INTERNAL MEDICINE

## 2021-08-14 PROCEDURE — 027034Z DILATION OF CORONARY ARTERY, ONE ARTERY WITH DRUG-ELUTING INTRALUMINAL DEVICE, PERCUTANEOUS APPROACH: ICD-10-PCS | Performed by: INTERNAL MEDICINE

## 2021-08-14 PROCEDURE — 74011250636 HC RX REV CODE- 250/636

## 2021-08-14 PROCEDURE — 77030008543 HC TBNG MON PRSS MRTM -A: Performed by: INTERNAL MEDICINE

## 2021-08-14 PROCEDURE — 36415 COLL VENOUS BLD VENIPUNCTURE: CPT

## 2021-08-14 PROCEDURE — C1887 CATHETER, GUIDING: HCPCS | Performed by: INTERNAL MEDICINE

## 2021-08-14 PROCEDURE — C1769 GUIDE WIRE: HCPCS | Performed by: INTERNAL MEDICINE

## 2021-08-14 PROCEDURE — 74011250637 HC RX REV CODE- 250/637: Performed by: INTERNAL MEDICINE

## 2021-08-14 PROCEDURE — 74011000250 HC RX REV CODE- 250: Performed by: INTERNAL MEDICINE

## 2021-08-14 PROCEDURE — C1874 STENT, COATED/COV W/DEL SYS: HCPCS | Performed by: INTERNAL MEDICINE

## 2021-08-14 PROCEDURE — 77030013715 HC INFL SYS MRTM -B: Performed by: INTERNAL MEDICINE

## 2021-08-14 PROCEDURE — 4A023N7 MEASUREMENT OF CARDIAC SAMPLING AND PRESSURE, LEFT HEART, PERCUTANEOUS APPROACH: ICD-10-PCS | Performed by: INTERNAL MEDICINE

## 2021-08-14 PROCEDURE — C1725 CATH, TRANSLUMIN NON-LASER: HCPCS | Performed by: INTERNAL MEDICINE

## 2021-08-14 PROCEDURE — 71045 X-RAY EXAM CHEST 1 VIEW: CPT

## 2021-08-14 PROCEDURE — 99152 MOD SED SAME PHYS/QHP 5/>YRS: CPT | Performed by: INTERNAL MEDICINE

## 2021-08-14 PROCEDURE — 85025 COMPLETE CBC W/AUTO DIFF WBC: CPT

## 2021-08-14 PROCEDURE — 99285 EMERGENCY DEPT VISIT HI MDM: CPT

## 2021-08-14 PROCEDURE — 99284 EMERGENCY DEPT VISIT MOD MDM: CPT

## 2021-08-14 PROCEDURE — 99223 1ST HOSP IP/OBS HIGH 75: CPT | Performed by: INTERNAL MEDICINE

## 2021-08-14 PROCEDURE — B2111ZZ FLUOROSCOPY OF MULTIPLE CORONARY ARTERIES USING LOW OSMOLAR CONTRAST: ICD-10-PCS | Performed by: INTERNAL MEDICINE

## 2021-08-14 PROCEDURE — 77030012468 HC VLV BLEEDBK CNTRL ABBT -B: Performed by: INTERNAL MEDICINE

## 2021-08-14 PROCEDURE — 74011250637 HC RX REV CODE- 250/637: Performed by: EMERGENCY MEDICINE

## 2021-08-14 PROCEDURE — 84484 ASSAY OF TROPONIN QUANT: CPT

## 2021-08-14 PROCEDURE — 93458 L HRT ARTERY/VENTRICLE ANGIO: CPT | Performed by: INTERNAL MEDICINE

## 2021-08-14 PROCEDURE — 85347 COAGULATION TIME ACTIVATED: CPT

## 2021-08-14 PROCEDURE — 74011250636 HC RX REV CODE- 250/636: Performed by: INTERNAL MEDICINE

## 2021-08-14 PROCEDURE — 2709999900 HC NON-CHARGEABLE SUPPLY: Performed by: INTERNAL MEDICINE

## 2021-08-14 PROCEDURE — 93005 ELECTROCARDIOGRAM TRACING: CPT

## 2021-08-14 PROCEDURE — C1894 INTRO/SHEATH, NON-LASER: HCPCS | Performed by: INTERNAL MEDICINE

## 2021-08-14 PROCEDURE — 83735 ASSAY OF MAGNESIUM: CPT

## 2021-08-14 PROCEDURE — 80053 COMPREHEN METABOLIC PANEL: CPT

## 2021-08-14 PROCEDURE — 65610000006 HC RM INTENSIVE CARE

## 2021-08-14 PROCEDURE — 74011000636 HC RX REV CODE- 636: Performed by: INTERNAL MEDICINE

## 2021-08-14 PROCEDURE — 65620000000 HC RM CCU GENERAL

## 2021-08-14 PROCEDURE — 92975 HC DISSOLVE HRT CLOT W ANGIO: CPT | Performed by: INTERNAL MEDICINE

## 2021-08-14 PROCEDURE — 99153 MOD SED SAME PHYS/QHP EA: CPT | Performed by: INTERNAL MEDICINE

## 2021-08-14 PROCEDURE — 74011250636 HC RX REV CODE- 250/636: Performed by: EMERGENCY MEDICINE

## 2021-08-14 PROCEDURE — 83690 ASSAY OF LIPASE: CPT

## 2021-08-14 PROCEDURE — 96374 THER/PROPH/DIAG INJ IV PUSH: CPT

## 2021-08-14 DEVICE — XIENCE SIERRA™ EVEROLIMUS ELUTING CORONARY STENT SYSTEM 3.25 MM X 15 MM / RAPID-EXCHANGE
Type: IMPLANTABLE DEVICE | Status: FUNCTIONAL
Brand: XIENCE SIERRA™

## 2021-08-14 RX ORDER — GUAIFENESIN 100 MG/5ML
324 LIQUID (ML) ORAL DAILY
Status: DISCONTINUED | OUTPATIENT
Start: 2021-08-15 | End: 2021-08-14

## 2021-08-14 RX ORDER — METOPROLOL TARTRATE 25 MG/1
25 TABLET, FILM COATED ORAL EVERY 12 HOURS
Status: DISCONTINUED | OUTPATIENT
Start: 2021-08-14 | End: 2021-08-17 | Stop reason: HOSPADM

## 2021-08-14 RX ORDER — ROSUVASTATIN CALCIUM 10 MG/1
20 TABLET, COATED ORAL
Status: DISCONTINUED | OUTPATIENT
Start: 2021-08-14 | End: 2021-08-17 | Stop reason: HOSPADM

## 2021-08-14 RX ORDER — ONDANSETRON 2 MG/ML
4 INJECTION INTRAMUSCULAR; INTRAVENOUS
Status: DISCONTINUED | OUTPATIENT
Start: 2021-08-14 | End: 2021-08-17 | Stop reason: HOSPADM

## 2021-08-14 RX ORDER — SODIUM CHLORIDE 0.9 % (FLUSH) 0.9 %
5-40 SYRINGE (ML) INJECTION EVERY 8 HOURS
Status: DISCONTINUED | OUTPATIENT
Start: 2021-08-14 | End: 2021-08-17 | Stop reason: HOSPADM

## 2021-08-14 RX ORDER — GUAIFENESIN 100 MG/5ML
81 LIQUID (ML) ORAL DAILY
Status: DISCONTINUED | OUTPATIENT
Start: 2021-08-15 | End: 2021-08-17 | Stop reason: HOSPADM

## 2021-08-14 RX ORDER — SODIUM CHLORIDE 0.9 % (FLUSH) 0.9 %
5-40 SYRINGE (ML) INJECTION AS NEEDED
Status: DISCONTINUED | OUTPATIENT
Start: 2021-08-14 | End: 2021-08-17 | Stop reason: HOSPADM

## 2021-08-14 RX ORDER — MORPHINE SULFATE 4 MG/ML
4 INJECTION INTRAVENOUS
Status: COMPLETED | OUTPATIENT
Start: 2021-08-14 | End: 2021-08-14

## 2021-08-14 RX ORDER — MORPHINE SULFATE 4 MG/ML
INJECTION, SOLUTION INTRAMUSCULAR; INTRAVENOUS
Status: COMPLETED
Start: 2021-08-14 | End: 2021-08-14

## 2021-08-14 RX ORDER — FENTANYL CITRATE 50 UG/ML
INJECTION, SOLUTION INTRAMUSCULAR; INTRAVENOUS AS NEEDED
Status: DISCONTINUED | OUTPATIENT
Start: 2021-08-14 | End: 2021-08-14 | Stop reason: HOSPADM

## 2021-08-14 RX ORDER — LIDOCAINE HYDROCHLORIDE 10 MG/ML
INJECTION INFILTRATION; PERINEURAL AS NEEDED
Status: DISCONTINUED | OUTPATIENT
Start: 2021-08-14 | End: 2021-08-14 | Stop reason: HOSPADM

## 2021-08-14 RX ORDER — SODIUM CHLORIDE 9 MG/ML
INJECTION, SOLUTION INTRAVENOUS
Status: COMPLETED | OUTPATIENT
Start: 2021-08-14 | End: 2021-08-14

## 2021-08-14 RX ORDER — PRASUGREL 10 MG/1
TABLET, FILM COATED ORAL AS NEEDED
Status: DISCONTINUED | OUTPATIENT
Start: 2021-08-14 | End: 2021-08-14 | Stop reason: HOSPADM

## 2021-08-14 RX ORDER — MORPHINE SULFATE 4 MG/ML
4 INJECTION INTRAVENOUS
Status: DISCONTINUED | OUTPATIENT
Start: 2021-08-14 | End: 2021-08-14 | Stop reason: HOSPADM

## 2021-08-14 RX ORDER — ONDANSETRON 2 MG/ML
INJECTION INTRAMUSCULAR; INTRAVENOUS AS NEEDED
Status: DISCONTINUED | OUTPATIENT
Start: 2021-08-14 | End: 2021-08-14 | Stop reason: HOSPADM

## 2021-08-14 RX ORDER — HEPARIN SODIUM 1000 [USP'U]/ML
INJECTION, SOLUTION INTRAVENOUS; SUBCUTANEOUS AS NEEDED
Status: DISCONTINUED | OUTPATIENT
Start: 2021-08-14 | End: 2021-08-14 | Stop reason: HOSPADM

## 2021-08-14 RX ORDER — LISINOPRIL 5 MG/1
5 TABLET ORAL DAILY
Status: DISCONTINUED | OUTPATIENT
Start: 2021-08-15 | End: 2021-08-17

## 2021-08-14 RX ORDER — MIDAZOLAM HYDROCHLORIDE 1 MG/ML
INJECTION, SOLUTION INTRAMUSCULAR; INTRAVENOUS AS NEEDED
Status: DISCONTINUED | OUTPATIENT
Start: 2021-08-14 | End: 2021-08-14 | Stop reason: HOSPADM

## 2021-08-14 RX ORDER — HEPARIN SODIUM 200 [USP'U]/100ML
INJECTION, SOLUTION INTRAVENOUS
Status: COMPLETED | OUTPATIENT
Start: 2021-08-14 | End: 2021-08-14

## 2021-08-14 RX ORDER — CETIRIZINE HCL 10 MG
10 TABLET ORAL DAILY
Status: DISCONTINUED | OUTPATIENT
Start: 2021-08-15 | End: 2021-08-17 | Stop reason: HOSPADM

## 2021-08-14 RX ORDER — PHENYLEPHRINE HYDROCHLORIDE 10 MG/ML
INJECTION INTRAVENOUS AS NEEDED
Status: DISCONTINUED | OUTPATIENT
Start: 2021-08-14 | End: 2021-08-14 | Stop reason: HOSPADM

## 2021-08-14 RX ORDER — GUAIFENESIN 100 MG/5ML
LIQUID (ML) ORAL
Status: DISCONTINUED
Start: 2021-08-14 | End: 2021-08-14 | Stop reason: HOSPADM

## 2021-08-14 RX ORDER — LEVOTHYROXINE SODIUM 100 UG/1
100 TABLET ORAL DAILY
Status: DISCONTINUED | OUTPATIENT
Start: 2021-08-15 | End: 2021-08-17 | Stop reason: HOSPADM

## 2021-08-14 RX ORDER — SODIUM CHLORIDE 9 MG/ML
125 INJECTION, SOLUTION INTRAVENOUS CONTINUOUS
Status: DISCONTINUED | OUTPATIENT
Start: 2021-08-14 | End: 2021-08-15

## 2021-08-14 RX ORDER — PRASUGREL 10 MG/1
10 TABLET, FILM COATED ORAL DAILY
Status: DISCONTINUED | OUTPATIENT
Start: 2021-08-15 | End: 2021-08-17 | Stop reason: HOSPADM

## 2021-08-14 RX ADMIN — SODIUM CHLORIDE 125 ML/HR: 900 INJECTION, SOLUTION INTRAVENOUS at 19:13

## 2021-08-14 RX ADMIN — ROSUVASTATIN 20 MG: 10 TABLET, FILM COATED ORAL at 22:23

## 2021-08-14 RX ADMIN — Medication 10 ML: at 22:24

## 2021-08-14 RX ADMIN — MORPHINE SULFATE 4 MG: 4 INJECTION INTRAVENOUS at 14:58

## 2021-08-14 RX ADMIN — SODIUM CHLORIDE 1000 ML: 9 INJECTION, SOLUTION INTRAVENOUS at 14:58

## 2021-08-14 RX ADMIN — ASPIRIN 324 MG: 81 TABLET, CHEWABLE ORAL at 14:58

## 2021-08-14 RX ADMIN — ONDANSETRON 4 MG: 2 INJECTION INTRAMUSCULAR; INTRAVENOUS at 20:46

## 2021-08-14 RX ADMIN — MORPHINE SULFATE 4 MG: 4 INJECTION INTRAVENOUS at 15:21

## 2021-08-14 RX ADMIN — METOPROLOL TARTRATE 25 MG: 25 TABLET, FILM COATED ORAL at 22:23

## 2021-08-14 NOTE — ED PROVIDER NOTES
70-year-old male with a history of GERD and thyroid disease presents with a chief complaint of chest pain. Patient states he has had intermittent chest pain for the last month. Approximately 40 minutes prior to arrival the pain became consistent. He denies abdominal pain, shortness of breath, nausea but does endorse diaphoresis. He describes his chest pain as aching pressure. It does radiate up the neck to the jaw. He denies aggravating or alleviating factors. Past Medical History:   Diagnosis Date    GERD (gastroesophageal reflux disease)     Thyroid disease        Past Surgical History:   Procedure Laterality Date    HX ORTHOPAEDIC      Knee scope x2, CTR x2         Family History:   Problem Relation Age of Onset    Diabetes Brother        Social History     Socioeconomic History    Marital status:      Spouse name: Not on file    Number of children: Not on file    Years of education: Not on file    Highest education level: Not on file   Occupational History    Not on file   Tobacco Use    Smoking status: Former Smoker     Quit date: 3/2/1985     Years since quittin.4    Smokeless tobacco: Never Used   Substance and Sexual Activity    Alcohol use: No     Alcohol/week: 0.0 standard drinks    Drug use: No    Sexual activity: Yes   Other Topics Concern    Not on file   Social History Narrative    Not on file     Social Determinants of Health     Financial Resource Strain:     Difficulty of Paying Living Expenses:    Food Insecurity:     Worried About Running Out of Food in the Last Year:     Ran Out of Food in the Last Year:    Transportation Needs:     Lack of Transportation (Medical):      Lack of Transportation (Non-Medical):    Physical Activity:     Days of Exercise per Week:     Minutes of Exercise per Session:    Stress:     Feeling of Stress :    Social Connections:     Frequency of Communication with Friends and Family:     Frequency of Social Gatherings with Friends and Family:     Attends Voodoo Services:     Active Member of Clubs or Organizations:     Attends Club or Organization Meetings:     Marital Status:    Intimate Partner Violence:     Fear of Current or Ex-Partner:     Emotionally Abused:     Physically Abused:     Sexually Abused: ALLERGIES: Patient has no known allergies. Review of Systems   Constitutional: Positive for diaphoresis. HENT: Negative for rhinorrhea. Respiratory: Negative for shortness of breath. Cardiovascular: Positive for chest pain. Gastrointestinal: Negative for abdominal pain. Genitourinary: Negative for dysuria. Musculoskeletal: Negative for back pain. Skin: Negative for wound. Neurological: Negative for headaches. Psychiatric/Behavioral: Negative for confusion. There were no vitals filed for this visit. Physical Exam  Vitals and nursing note reviewed. Constitutional:       Appearance: Normal appearance. He is well-developed. He is ill-appearing and diaphoretic. HENT:      Head: Normocephalic. Eyes:      Extraocular Movements: Extraocular movements intact. Cardiovascular:      Rate and Rhythm: Normal rate. Pulses: Normal pulses. Heart sounds: Normal heart sounds. Pulmonary:      Effort: Pulmonary effort is normal. No respiratory distress. Breath sounds: No decreased breath sounds, wheezing, rhonchi or rales. Abdominal:      General: Bowel sounds are normal. There is no distension. Palpations: Abdomen is soft. Tenderness: There is no abdominal tenderness. Musculoskeletal:         General: Normal range of motion. Cervical back: Normal range of motion. Skin:     Capillary Refill: Capillary refill takes less than 2 seconds. Neurological:      Mental Status: He is alert and oriented to person, place, and time.    Psychiatric:         Mood and Affect: Mood normal.          MDM  Number of Diagnoses or Management Options  ST elevation myocardial infarction (STEMI) of anterior wall [facility])  Diagnosis management comments: 77-year-old male presents with chest pain. He has an acute inferior wall STEMI on EKG. STEMI alert was activated. Patient was given IV fluids and morphine. He was subsequently taken to the Cath Lab for cardiac catheterization. He is comfortable and agreeable to plan of care. EKG shows sinus bradycardia rate of 56, normal intervals, normal axis, ST elevation in leads II, III and aVF with depression and aVL and lead I. Acute STEMI.     Total critical care time spent exclusive of procedures:  35 minutes         Amount and/or Complexity of Data Reviewed  Clinical lab tests: ordered and reviewed  Tests in the radiology section of CPT®: ordered and reviewed           Procedures

## 2021-08-14 NOTE — Clinical Note
Transport team arrived. Nursing supervisor discussing bed/hospital options with pt and pts family in lab.

## 2021-08-14 NOTE — Clinical Note
Lesion located in the Proximal RCA. Balloon balloon re-inflated. Lesion #1: Pressure = 4 matthew; Duration = 19 sec.

## 2021-08-14 NOTE — Clinical Note
Lesion located in the Proximal RCA. Balloon inserted. Balloon inflated using multiple inflation technique. Lesion #1: Pressure = 14 matthew; Duration = 40 sec. Inflation 2: Pressure = 12 matthew; Duration = 12 sec. Inflation 3: Pressure = 16 matthew; Duration = 11 sec.

## 2021-08-14 NOTE — H&P
Dr. German Umanzor. [phone]            Cardiology Consult/Progress Note      Requesting/referring provider: MD Dr. Osei Reardon    Reason for Consult: Acute onset chest discomfort    Assessment/Plan:  1. Acute inferior ST elevation myocardial infarction  2. History of thyroid disease  3. Rheumatoid arthritis    Mr. Mango Carl is seen in the emergency room for acute onset chest discomfort that started at 2 PM on August 14, 2021. He presented to the emergency room 1 hour later and was noted to have acute ST elevations in inferior leads this was associated with reciprocal ST depressions in lateral precordial leads. He appears to have acute inferior ST elevation myocardial infarction. He will be taken to the Cath Lab urgently to perform coronary angiography and perform primary PCI if feasible. Patient is currently in significant chest discomfort. Hemodynamically he has not demonstrated any high degree AV block or syncopal events are severe hypertension. He will eventually require guideline directed medical therapy for coronary artery disease. Rest of the management will be based on results of coronary angiography. I discussed the risks/benefits/alternatives of the procedure with the patient. Risks include (but are not limited to) bleeding, infection,stroke,heart attack, need for emergency surgery/pericardiocentesis, need for dialysis or death. The patient understands and agrees to proceed. Investigations ordered    []    High complexity decision making was performed  []    Patient is at high-risk of decompensation with multiple organ involvement  []    Complex/difficult social determinants of health outcomes  I personally spent 35 minutes of critical care time. This is time spent at this critically ill patient's bedside actively involved in patient care as well as the coordination of care and discussions with the patient's family. This does not include any procedural time which has been billed separately. Investigations personally reviewed and interpreted  ECG from the ER was reviewed and shows sinus rhythm, inferior ST elevations, ST depressions reciprocally in lead aVL. Investigations reviewed    HPI: Clare Sparks, a 64y.o. year-old who is seen for evaluation of chest pain and management of STEMI. He has been reporting off-and-on stuttering chest discomfort for the past 2 weeks. He was previously seen in outpatient center where it was felt to be possibly gastroesophageal reflux. On 2021, around 2 PM he started having severe crushing chest discomfort with pressure and burning sensation in the chest which was continuous. This was associated with shortness of breath. The pain did radiate to the left shoulder. There was no associated syncope, nausea, vomiting, diaphoresis. His past medical history significant for recently diagnosed rheumatoid arthritis for which he was treated with Enbrel and steroids and is on maintenance methotrexate. He has a family history of coronary disease in his brother as well as his father but details are unavailable. He  has a past medical history of GERD (gastroesophageal reflux disease) and Thyroid disease. Review of system:Patient reports no PND/Orthpnea. He reports no cough/fever/focal neurological deficits/abdominal pain. All other systems negative except as above.    Family History   Problem Relation Age of Onset    Diabetes Brother       Social History     Socioeconomic History    Marital status:      Spouse name: Not on file    Number of children: Not on file    Years of education: Not on file    Highest education level: Not on file   Tobacco Use    Smoking status: Former Smoker     Quit date: 3/2/1985     Years since quittin.4    Smokeless tobacco: Never Used   Substance and Sexual Activity    Alcohol use: No     Alcohol/week: 0.0 standard drinks    Drug use: No    Sexual activity: Yes     Social Determinants of Health     Financial Resource Strain:     Difficulty of Paying Living Expenses:    Food Insecurity:     Worried About Running Out of Food in the Last Year:     [address] in the Last Year:    Transportation Needs:     Lack of Transportation (Medical):  Lack of Transportation (Non-Medical):    Physical Activity:     Days of Exercise per Week:     Minutes of Exercise per Session:    Stress:     Feeling of Stress :    Social Connections:     Frequency of Communication with Friends and Family:     Frequency of Social Gatherings with Friends and Family:     Attends Mu-ism Services:     Active Member of Clubs or Organizations:     Attends Club or Organization Meetings:     Marital Status:       PE  Vitals:    08/14/21 1455 08/14/21 1500 08/14/21 1515   BP: (!) 170/92 (!) 177/102 (!) 171/108   Pulse: (!) 55 60 (!) 59   Resp: 20 20 14   SpO2: 99%  100%   Weight: 160 lb (72.6 kg)     Height: 5' 8\" (1.727 m)      Body mass index is 24.33 kg/m². General:    Alert, cooperative, appears to be in distress due to pain. Sharifa Chiang Psychiatric:    Normal Mood and affect    Eye/ENT:      Pupils equal, No asymmetry, Conjunctival pink. Able to hear voice at normal amplitude   Lungs:      Visibly symmetric chest expansion, No palpable tenderness. Clear to auscultation bilaterally. Heart[de-identified]    Regular rate and rhythm, S1, S2 normal, no murmur, click, rub or gallop. No JVD, Normal palpable peripheral pulses. No cyanosis   Abdomen:     Soft, non-tender. Bowel sounds normal. No masses,  No      organomegaly. Extremities:   Extremities normal, atraumatic, no edema. Neurologic:   CN II-XII grossly intact.  No gross focal deficits           Recent Labs:  Lab Results   Component Value Date/Time    Cholesterol, total 196 08/25/2017 08:44 AM    HDL Cholesterol 72 08/25/2017 08:44 AM    LDL, calculated 112 (H) 08/25/2017 08:44 AM    Triglyceride 59 08/25/2017 08:44 AM CHOL/HDL Ratio 3.0 10/08/2015 09:00 AM     Lab Results   Component Value Date/Time    Creatinine 0.89 2021 09:06 AM     Lab Results   Component Value Date/Time    BUN 17 2021 09:06 AM     Lab Results   Component Value Date/Time    Potassium 4.1 2021 09:06 AM     Lab Results   Component Value Date/Time    Hemoglobin A1c 5.7 (H) 2017 08:44 AM     Lab Results   Component Value Date/Time    HGB 13.0 2021 02:55 PM     Lab Results   Component Value Date/Time    PLATELET 167  02:55 PM       Reviewed:  Past Medical History:   Diagnosis Date    GERD (gastroesophageal reflux disease)     Thyroid disease      Social History     Tobacco Use   Smoking Status Former Smoker    Quit date: 3/2/1985    Years since quittin.4   Smokeless Tobacco Never Used     Social History     Substance and Sexual Activity   Alcohol Use No    Alcohol/week: 0.0 standard drinks     No Known Allergies  Family History   Problem Relation Age of Onset    Diabetes Brother         Current Facility-Administered Medications   Medication Dose Route Frequency    sodium chloride 0.9 % bolus infusion 1,000 mL  1,000 mL IntraVENous ONCE    [START ON 8/15/2021] aspirin chewable tablet 324 mg  324 mg Oral DAILY    aspirin 81 mg chewable tablet        morphine injection 4 mg  4 mg IntraVENous NOW     Current Outpatient Medications   Medication Sig    levothyroxine (SYNTHROID) 100 mcg tablet TAKE ONE TABLET BY MOUTH DAILY BEFORE BREAKFAST    promethazine (PHENERGAN) 25 mg tablet TAKE ONE TABLET BY MOUTH ONCE NIGHTLY    cetirizine (ZYRTEC) 10 mg tablet Take 1 Tab by mouth daily. Dulce Lebron MD21     ATTENTION:   This medical record was transcribed using an electronic medical records/speech recognition system. Although proofread, it may and can contain electronic, spelling and other errors. Corrections may be executed at a later time.   Please feel free to contact us for any clarifications as needed.     [facility] [place]  [facility], [facility],  [place], [place]. [phone]

## 2021-08-14 NOTE — Clinical Note
Lesion located in the Proximal RCA. Balloon balloon re-inflated. Lesion #1: Pressure = 1 matthew; Duration = 11 sec.

## 2021-08-14 NOTE — Clinical Note
Lesion located in the Proximal RCA. Balloon inserted. Balloon inflated using multiple inflation technique. Lesion #1: Pressure = 8 matthew; Duration = 11 sec. Inflation 2: Pressure = 3 matthew; Duration = 20 sec. Inflation 3: Pressure = 8 matthew; Duration = 12 sec. Inflation 4: Pressure = 8 matthew; Duration = 9 sec.

## 2021-08-14 NOTE — ED NOTES
Pads applied to patient, code cart in room. Pt groin shaved at this time and prepped for cath lab; waiting on cath lab and cardiologist at this time.

## 2021-08-14 NOTE — ED TRIAGE NOTES
Pt arrives with the c.c. of medial chest pain that started about 45 minutes ago, pt has no cardiac history, pt reports some shortness of breath. Pt is pale and diaphoretic. Stemi alert called.

## 2021-08-14 NOTE — Clinical Note
Pt monitored in cath lab by cath lab staff. Pt remains on monitoring equipment. Awaiting bed assignment.

## 2021-08-14 NOTE — Clinical Note
Lesion: Located in the Proximal RCA. Stent deployed. Single technique used. First inflation pressure = 18 matthew; inflation time: 60 sec.

## 2021-08-14 NOTE — Clinical Note
TRANSFER - IN REPORT:     Verbal report received from: ED RN. Report consisted of patient's Situation, Background, Assessment and   Recommendations(SBAR). Opportunity for questions and clarification was provided. Assessment completed upon patient's arrival to unit and care assumed. Patient transported with a Registered Nurse.

## 2021-08-14 NOTE — PROCEDURES
Findings  1. Severe native one-vessel disease  2. Acute inferior STEMI with culprit proximal % stenosis  3. Mild to moderate diffuse disease in LAD(type II) and mild disease in circumflex. Fletcher of the heart likely supplied by RCA  4. Primary PCI with placement of 3.25 x 15 mm Xience drug-eluting stent in proximal RCA postdilated with 3.75 noncompliant balloon at 20 matthew. Distal embolization PL and PDA managed with intracoronary Integrilin and balloon massage with finally good flow achieved in both branches. Terminal segments of PL and PDA did have embolic occlusion. 5.  High normal LVEDP of 20 mmHg    Access right radial no issues  Contrast 70 cc    Recommendations  1. Guideline directed medical therapy for post MI LV care  2. Optimization of atherosclerotic disease risk factors including initiation of statins  3.   Close monitoring in ICU for reperfusion arrhythmias and injury

## 2021-08-14 NOTE — PROGRESS NOTES
Spiritual Care Assessment/Progress Note  [address]      NAME: China Rivas      MRN: [de-identified]  AGE: 64 y.o. SEX: male  Restorationism Affiliation: Unknown   Language: English     8/14/2021     Total Time (in minutes): 2     Spiritual Assessment begun in [facility] EMERGENCY DEPT through conversation with:         [x]Patient        [x] Family    [] Friend(s)        Reason for Consult: Crisis, Stemi     Spiritual beliefs: (Please include comment if needed)     [] Identifies with a joycelyn tradition:         [] Supported by a joycelyn community:            [] Claims no spiritual orientation:           [] Seeking spiritual identity:                [] Adheres to an individual form of spirituality:           [x] Not able to assess:                           Identified resources for coping:      [x] Prayer                               [] Music                  [] Guided Imagery     [x] Family/friends                 [] Pet visits     [] Devotional reading                         [] Unknown     [] Other:                                           Interventions offered during this visit: (See comments for more details)    Patient Interventions: Other (comment) (Unable to assess)           Plan of Care:     [] Support spiritual and/or cultural needs    [] Support AMD and/or advance care planning process      [] Support grieving process   [] Coordinate Rites and/or Rituals    [] Coordination with community clergy   [] No spiritual needs identified at this time   [] Detailed Plan of Care below (See Comments)  [] Make referral to Music Therapy  [] Make referral to Pet Therapy     [] Make referral to Addiction services  [] Make referral to [facility]  [] Make referral to Spiritual Care Partner  [] No future visits requested        [x] Follow up upon further referrals     Comments:  responded to a Code Stemi for Mr. Annel Adams in the ER.  He was being assessed by numerous providers at the time of the 's visit and the privacy curtain was closed. After a few minutes  was notified that Mr. Vipin Grossman and his wife, Joann Toussaint, were able to see the . . Vipin Grossman was lying in bed and appeared to be very uncomfortable, currently waiting be taken to Cath/Angio. His wife was tearful, standing next to the bedside.  introduced herself and extended support. Joann Toussaint shared her feelings of her uncertainty and hopelessness, requesting  to keep them in prayer. Assurance of prayer provided. Joann Toussaint expressed no needs at this time. Chaplains are available for further support upon referral. 's are available for further support upon referral  Marc Cornejo. Patty Byrne.      Paging Service: [phone])

## 2021-08-14 NOTE — PROGRESS NOTES
TRANSFER - IN REPORT:    Verbal report received from [facility] (name) on Morgan Longest  being received from [facility] (unit) for routine progression of care      Report consisted of patients Situation, Background, Assessment and   Recommendations(SBAR). Information from the following report(s) SBAR, Kardex, Intake/Output, MAR and Recent Results was reviewed with the receiving nurse. Opportunity for questions and clarification was provided. Assessment completed upon patients arrival to unit and care assumed. 1900 Pt arrived to unit. TR band in place with 11cc. Primary Nurse Saniya Kaufman and Damion Newsome RN performed a dual skin assessment on this patient No impairment noted  Samuel score is 20  3cc of air removed from TR band. Site clean, dry, intact. 8cc remain. 1920 2cc of air removed from TR band. Site clean, dry, intact. 6cc remain. 1935 2cc of air removed from TR band. Site clean, dry, intact. 4cc remain. 1955 Bleeding noted at TR band site. 2cc inserted. 6cc total remain. 2000 Bedside shift change report given to Roxi Whatley RN (oncoming nurse) by Saige Staton RN (offgoing nurse). Report included the following information SBAR, Kardex, Intake/Output, MAR and Recent Results.

## 2021-08-14 NOTE — Clinical Note
TRANSFER - OUT REPORT:     Verbal report given to: derick. Report consisted of patient's Situation, Background, Assessment and   Recommendations(SBAR). Opportunity for questions and clarification was provided. Pt transported to [facility] via [address] transport.

## 2021-08-15 LAB
ALBUMIN SERPL-MCNC: 3.2 G/DL (ref 3.5–5)
ALBUMIN/GLOB SERPL: 1.1 {RATIO} (ref 1.1–2.2)
ALP SERPL-CCNC: 52 U/L (ref 45–117)
ALT SERPL-CCNC: 88 U/L (ref 12–78)
ANION GAP SERPL CALC-SCNC: 5 MMOL/L (ref 5–15)
AST SERPL-CCNC: 405 U/L (ref 15–37)
ATRIAL RATE: 69 BPM
BASOPHILS # BLD: 0 K/UL (ref 0–0.1)
BASOPHILS NFR BLD: 0 % (ref 0–1)
BILIRUB SERPL-MCNC: 0.4 MG/DL (ref 0.2–1)
BUN SERPL-MCNC: 11 MG/DL (ref 6–20)
BUN/CREAT SERPL: 13 (ref 12–20)
CALCIUM SERPL-MCNC: 8.3 MG/DL (ref 8.5–10.1)
CALCULATED P AXIS, ECG09: 58 DEGREES
CALCULATED R AXIS, ECG10: -29 DEGREES
CALCULATED T AXIS, ECG11: -9 DEGREES
CHLORIDE SERPL-SCNC: 108 MMOL/L (ref 97–108)
CHOLEST SERPL-MCNC: 181 MG/DL
CO2 SERPL-SCNC: 26 MMOL/L (ref 21–32)
CREAT SERPL-MCNC: 0.83 MG/DL (ref 0.7–1.3)
DIAGNOSIS, 93000: NORMAL
DIFFERENTIAL METHOD BLD: ABNORMAL
EOSINOPHIL # BLD: 0.1 K/UL (ref 0–0.4)
EOSINOPHIL NFR BLD: 1 % (ref 0–7)
ERYTHROCYTE [DISTWIDTH] IN BLOOD BY AUTOMATED COUNT: 14.1 % (ref 11.5–14.5)
GLOBULIN SER CALC-MCNC: 2.8 G/DL (ref 2–4)
GLUCOSE SERPL-MCNC: 137 MG/DL (ref 65–100)
HCT VFR BLD AUTO: 34.5 % (ref 36.6–50.3)
HDLC SERPL-MCNC: 53 MG/DL
HDLC SERPL: 3.4 {RATIO} (ref 0–5)
HGB BLD-MCNC: 11.1 G/DL (ref 12.1–17)
IMM GRANULOCYTES # BLD AUTO: 0 K/UL (ref 0–0.04)
IMM GRANULOCYTES NFR BLD AUTO: 0 % (ref 0–0.5)
LDLC SERPL CALC-MCNC: 105.2 MG/DL (ref 0–100)
LYMPHOCYTES # BLD: 2 K/UL (ref 0.8–3.5)
LYMPHOCYTES NFR BLD: 20 % (ref 12–49)
MCH RBC QN AUTO: 31 PG (ref 26–34)
MCHC RBC AUTO-ENTMCNC: 32.2 G/DL (ref 30–36.5)
MCV RBC AUTO: 96.4 FL (ref 80–99)
MONOCYTES # BLD: 1 K/UL (ref 0–1)
MONOCYTES NFR BLD: 10 % (ref 5–13)
NEUTS SEG # BLD: 6.6 K/UL (ref 1.8–8)
NEUTS SEG NFR BLD: 69 % (ref 32–75)
NRBC # BLD: 0 K/UL (ref 0–0.01)
NRBC BLD-RTO: 0 PER 100 WBC
P-R INTERVAL, ECG05: 150 MS
PLATELET # BLD AUTO: 188 K/UL (ref 150–400)
PMV BLD AUTO: 10.3 FL (ref 8.9–12.9)
POTASSIUM SERPL-SCNC: 4.3 MMOL/L (ref 3.5–5.1)
PROT SERPL-MCNC: 6 G/DL (ref 6.4–8.2)
Q-T INTERVAL, ECG07: 388 MS
QRS DURATION, ECG06: 96 MS
QTC CALCULATION (BEZET), ECG08: 415 MS
RBC # BLD AUTO: 3.58 M/UL (ref 4.1–5.7)
SODIUM SERPL-SCNC: 139 MMOL/L (ref 136–145)
TRIGL SERPL-MCNC: 114 MG/DL (ref ?–150)
VENTRICULAR RATE, ECG03: 69 BPM
VLDLC SERPL CALC-MCNC: 22.8 MG/DL
WBC # BLD AUTO: 9.7 K/UL (ref 4.1–11.1)

## 2021-08-15 PROCEDURE — 36415 COLL VENOUS BLD VENIPUNCTURE: CPT

## 2021-08-15 PROCEDURE — 85025 COMPLETE CBC W/AUTO DIFF WBC: CPT

## 2021-08-15 PROCEDURE — 74011250636 HC RX REV CODE- 250/636: Performed by: INTERNAL MEDICINE

## 2021-08-15 PROCEDURE — 99233 SBSQ HOSP IP/OBS HIGH 50: CPT | Performed by: SPECIALIST

## 2021-08-15 PROCEDURE — 74011250637 HC RX REV CODE- 250/637: Performed by: INTERNAL MEDICINE

## 2021-08-15 PROCEDURE — 74011250637 HC RX REV CODE- 250/637: Performed by: SPECIALIST

## 2021-08-15 PROCEDURE — 65620000000 HC RM CCU GENERAL

## 2021-08-15 PROCEDURE — 80061 LIPID PANEL: CPT

## 2021-08-15 PROCEDURE — 80053 COMPREHEN METABOLIC PANEL: CPT

## 2021-08-15 RX ORDER — PROMETHAZINE HYDROCHLORIDE 25 MG/1
25 TABLET ORAL
Status: DISCONTINUED | OUTPATIENT
Start: 2021-08-15 | End: 2021-08-17 | Stop reason: HOSPADM

## 2021-08-15 RX ADMIN — METOPROLOL TARTRATE 25 MG: 25 TABLET, FILM COATED ORAL at 20:14

## 2021-08-15 RX ADMIN — PROMETHAZINE HYDROCHLORIDE 25 MG: 25 TABLET ORAL at 20:15

## 2021-08-15 RX ADMIN — CETIRIZINE HYDROCHLORIDE 10 MG: 10 TABLET, FILM COATED ORAL at 07:16

## 2021-08-15 RX ADMIN — PRASUGREL 10 MG: 10 TABLET, FILM COATED ORAL at 09:07

## 2021-08-15 RX ADMIN — PROMETHAZINE HYDROCHLORIDE 25 MG: 25 TABLET ORAL at 09:07

## 2021-08-15 RX ADMIN — LEVOTHYROXINE SODIUM 100 MCG: 0.1 TABLET ORAL at 07:16

## 2021-08-15 RX ADMIN — ROSUVASTATIN 20 MG: 10 TABLET, FILM COATED ORAL at 22:15

## 2021-08-15 RX ADMIN — ONDANSETRON 4 MG: 2 INJECTION INTRAMUSCULAR; INTRAVENOUS at 05:19

## 2021-08-15 RX ADMIN — LISINOPRIL 5 MG: 5 TABLET ORAL at 09:07

## 2021-08-15 RX ADMIN — ASPIRIN 81 MG: 81 TABLET, CHEWABLE ORAL at 09:07

## 2021-08-15 RX ADMIN — Medication 10 ML: at 05:20

## 2021-08-15 RX ADMIN — ONDANSETRON 4 MG: 2 INJECTION INTRAMUSCULAR; INTRAVENOUS at 01:20

## 2021-08-15 NOTE — PROGRESS NOTES
Care Management Interventions  PCP Verified by CM: Yes  Last Visit to PCP: 08/03/21  Palliative Care Criteria Met (RRAT>21 & CHF Dx)?: No  Mode of Transport at Discharge:  (car wife [place])  Transition of Care Consult (CM Consult): Other (cardiac rehab )  Physical Therapy Consult: No  Occupational Therapy Consult: No  Speech Therapy Consult: No  Current Support Network: Lives with Spouse Pj Kam [phone])  Confirm Follow Up Transport: Family (wife )  The Plan for Transition of Care is Related to the Following Treatment Goals : d/w patient  Discharge Location  Discharge Placement: Home   CRM met with patient at his bedside. 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 He was diagonosed with RA. He notes that he has been to a specialist and awoke the other day with severe chest pain. He went to [facility] and was sent to [facility] [place]. Patient lives with his wife and presently he is applying for disability benefits and has no insurance. He has information about the care card and is hopeful he will be discharged home in the am with his wife picking him up. He is interested in Cardiac Rehab,but is worried about the finances. Patient uses the [facility] in [place] for his medications and gets drug assistance for his one RA drug Embryl. Care management will follow for transitions of care needs.

## 2021-08-15 NOTE — PROGRESS NOTES
1615- Report obtained from [facility]. Patient feeling well. Will continue to monitor. 1930- Uneventful evening. Report given to [address]

## 2021-08-15 NOTE — PROGRESS NOTES
0730 Bedside shift change report given to Alethea (oncoming nurse) by Nydia Cole (offgoing nurse). Report included the following information SBAR, Intake/Output, MAR, Recent Results and Cardiac Rhythm Sinus Cleophus Lacks. [id number] Dr. Wes Romero paged regarding pt's continued nausea despite multiple administrations of zofran overnight. Pt takes Phenergan at home. Orders received for Phenergan. Dr. Wes Romero also notified of bradycardia HR 45-55 bpm. Plan to hold AM Metoprolol at this time.

## 2021-08-15 NOTE — ROUTINE PROCESS
1930: Bedside shift change report given to Nam Bennett (oncoming nurse) by Sonali Bee (offgoing nurse). Report included the following information SBAR, Kardex, MAR and Cardiac Rhythm NSR.       2000: Bedside assessment. q15 min check TR band. 2100: Resume releasing air from TR band. Pt c/o nausea. IV zofran given. 2200: TR band off. Site C,D,I.      2230: Patient has emesis of stomach fluid. Patient states feels better post vomiting.    2300: Updated patient's wife and sister-in-law on patient status. Patient stable. Cath site CDI    0001: R wrist site CDI. Pulse intact. 0115: Recurrent nausea. IV zofran given. Continue to monitor. Stable on telemetry. Denies chest pain. 0400: Patient have nausea episode. Vitals stable, telemetry unchanged. Denies chest pain. Explains this is reaction to pain medication fr cath lab/ED    0700: Updated pt's wife, Abebe Burris, about patient progress overnight. Patient denies pain or discomfort. Vital signs stable. 0730: Bedside shift change report given to Dayanara Rizzo (oncoming nurse) by Nam Bennett (offgoing nurse). Report included the following information SBAR, Kardex, MAR and Cardiac Rhythm SRc BBB.

## 2021-08-16 ENCOUNTER — APPOINTMENT (OUTPATIENT)
Dept: NON INVASIVE DIAGNOSTICS | Age: 61
DRG: 247 | End: 2021-08-16
Attending: SPECIALIST

## 2021-08-16 LAB
ANION GAP SERPL CALC-SCNC: 5 MMOL/L (ref 5–15)
BUN SERPL-MCNC: 12 MG/DL (ref 6–20)
BUN/CREAT SERPL: 14 (ref 12–20)
CALCIUM SERPL-MCNC: 9 MG/DL (ref 8.5–10.1)
CHLORIDE SERPL-SCNC: 110 MMOL/L (ref 97–108)
CO2 SERPL-SCNC: 26 MMOL/L (ref 21–32)
CREAT SERPL-MCNC: 0.85 MG/DL (ref 0.7–1.3)
ECHO AV AREA PEAK VELOCITY: 3.3 CM2
ECHO AV AREA/BSA PEAK VELOCITY: 1.7 CM2/M2
ECHO AV PEAK GRADIENT: 6.69 MMHG
ECHO AV PEAK VELOCITY: 129.34 CM/S
ECHO LA AREA 4C: 16.13 CM2
ECHO LA MAJOR AXIS: 3.16 CM
ECHO LA MINOR AXIS: 1.64 CM
ECHO LA VOL 4C: 40.11 ML (ref 18–58)
ECHO LA VOLUME INDEX A4C: 20.78 ML/M2 (ref 16–28)
ECHO LV INTERNAL DIMENSION DIASTOLIC: 4.79 CM (ref 4.2–5.9)
ECHO LV INTERNAL DIMENSION SYSTOLIC: 3.02 CM
ECHO LV IVSD: 0.91 CM (ref 0.6–1)
ECHO LV MASS 2D: 130.3 G (ref 88–224)
ECHO LV MASS INDEX 2D: 67.5 G/M2 (ref 49–115)
ECHO LV POSTERIOR WALL DIASTOLIC: 0.73 CM (ref 0.6–1)
ECHO LVOT DIAM: 2.38 CM
ECHO LVOT PEAK GRADIENT: 3.66 MMHG
ECHO LVOT PEAK VELOCITY: 95.69 CM/S
ECHO RV INTERNAL DIMENSION: 3.1 CM
ECHO RV TAPSE: 2.11 CM (ref 1.5–2)
GLUCOSE SERPL-MCNC: 106 MG/DL (ref 65–100)
POTASSIUM SERPL-SCNC: 3.9 MMOL/L (ref 3.5–5.1)
SODIUM SERPL-SCNC: 141 MMOL/L (ref 136–145)
TSH SERPL DL<=0.05 MIU/L-ACNC: 2.82 UIU/ML (ref 0.36–3.74)

## 2021-08-16 PROCEDURE — 93306 TTE W/DOPPLER COMPLETE: CPT

## 2021-08-16 PROCEDURE — 65660000000 HC RM CCU STEPDOWN

## 2021-08-16 PROCEDURE — 74011250637 HC RX REV CODE- 250/637: Performed by: INTERNAL MEDICINE

## 2021-08-16 PROCEDURE — 74011250636 HC RX REV CODE- 250/636: Performed by: INTERNAL MEDICINE

## 2021-08-16 PROCEDURE — 80048 BASIC METABOLIC PNL TOTAL CA: CPT

## 2021-08-16 PROCEDURE — 93306 TTE W/DOPPLER COMPLETE: CPT | Performed by: SPECIALIST

## 2021-08-16 PROCEDURE — 99233 SBSQ HOSP IP/OBS HIGH 50: CPT | Performed by: INTERNAL MEDICINE

## 2021-08-16 PROCEDURE — 74011250637 HC RX REV CODE- 250/637: Performed by: SPECIALIST

## 2021-08-16 PROCEDURE — 84443 ASSAY THYROID STIM HORMONE: CPT

## 2021-08-16 PROCEDURE — 94760 N-INVAS EAR/PLS OXIMETRY 1: CPT

## 2021-08-16 PROCEDURE — 36415 COLL VENOUS BLD VENIPUNCTURE: CPT

## 2021-08-16 RX ORDER — METHOTREXATE 2.5 MG/1
25 TABLET ORAL
COMMUNITY
End: 2021-09-08 | Stop reason: ALTCHOICE

## 2021-08-16 RX ORDER — METHOTREXATE 2.5 MG/1
25 TABLET ORAL
Status: DISCONTINUED | OUTPATIENT
Start: 2021-08-16 | End: 2021-08-17 | Stop reason: HOSPADM

## 2021-08-16 RX ORDER — METHOTREXATE 2.5 MG/1
25 TABLET ORAL
Status: DISCONTINUED | OUTPATIENT
Start: 2021-08-23 | End: 2021-08-16

## 2021-08-16 RX ADMIN — Medication 10 ML: at 14:00

## 2021-08-16 RX ADMIN — PROMETHAZINE HYDROCHLORIDE 25 MG: 25 TABLET ORAL at 21:43

## 2021-08-16 RX ADMIN — ASPIRIN 81 MG: 81 TABLET, CHEWABLE ORAL at 08:18

## 2021-08-16 RX ADMIN — METHOTREXATE 25 MG: 2.5 TABLET ORAL at 18:19

## 2021-08-16 RX ADMIN — PRASUGREL 10 MG: 10 TABLET, FILM COATED ORAL at 08:18

## 2021-08-16 RX ADMIN — CETIRIZINE HYDROCHLORIDE 10 MG: 10 TABLET, FILM COATED ORAL at 21:43

## 2021-08-16 RX ADMIN — ROSUVASTATIN 20 MG: 10 TABLET, FILM COATED ORAL at 21:36

## 2021-08-16 RX ADMIN — METOPROLOL TARTRATE 25 MG: 25 TABLET, FILM COATED ORAL at 08:18

## 2021-08-16 RX ADMIN — LISINOPRIL 5 MG: 5 TABLET ORAL at 08:18

## 2021-08-16 RX ADMIN — Medication 10 ML: at 21:43

## 2021-08-16 RX ADMIN — LEVOTHYROXINE SODIUM 100 MCG: 0.1 TABLET ORAL at 08:18

## 2021-08-16 RX ADMIN — METOPROLOL TARTRATE 25 MG: 25 TABLET, FILM COATED ORAL at 21:36

## 2021-08-16 NOTE — PROGRESS NOTES
0730 Bedside and Verbal shift change report given to Cherelle Durbin (oncoming nurse) by Loyda Huitron (offgoing nurse). Report included the following information SBAR, Kardex, ED Summary, Procedure Summary, Intake/Output, MAR, Accordion and Recent Results.

## 2021-08-16 NOTE — PROGRESS NOTES
Problem: Patient Education: Go to Patient Education Activity  Goal: Patient/Family Education  Outcome: Resolved/Met     Problem: Cath Lab Procedures: Pre-Procedure  Goal: Off Pathway (Use only if patient is Off Pathway)  Outcome: Resolved/Met  Goal: Activity/Safety  Outcome: Resolved/Met  Goal: Consults, if ordered  Outcome: Resolved/Met  Goal: Diagnostic Test/Procedures  Outcome: Resolved/Met  Goal: Nutrition/Diet  Outcome: Resolved/Met  Goal: Discharge Planning  Outcome: Resolved/Met  Goal: Medications  Outcome: Resolved/Met  Goal: Respiratory  Outcome: Resolved/Met  Goal: Treatments/Interventions/Procedures  Outcome: Resolved/Met  Goal: Psychosocial  Outcome: Resolved/Met  Goal: *Verbalize description of procedure  Outcome: Resolved/Met  Goal: *Consent signed  Outcome: Resolved/Met     Problem: Cath Lab Procedures: Post-Cath Day of Procedure (Initiate SCIP Measures for Post-Op Care)  Goal: Off Pathway (Use only if patient is Off Pathway)  Outcome: Resolved/Met  Goal: Activity/Safety  Outcome: Resolved/Met  Goal: Consults, if ordered  Outcome: Resolved/Met  Goal: Diagnostic Test/Procedures  Outcome: Resolved/Met  Goal: Nutrition/Diet  Outcome: Resolved/Met  Goal: Discharge Planning  Outcome: Resolved/Met  Goal: Medications  Outcome: Resolved/Met  Goal: Respiratory  Outcome: Resolved/Met  Goal: Treatments/Interventions/Procedures  Outcome: Resolved/Met  Goal: Psychosocial  Outcome: Resolved/Met  Goal: *Procedure site is without bleeding and signs of infection six hours post sheath removal  Outcome: Resolved/Met  Goal: *Hemodynamically stable  Outcome: Resolved/Met  Goal: *Optimal pain control at patient's stated goal  Outcome: Resolved/Met     Problem: Cath Lab Procedures: Post-Cath Day 1  Goal: Off Pathway (Use only if patient is Off Pathway)  Outcome: Progressing Towards Goal  Goal: Activity/Safety  Outcome: Progressing Towards Goal  Goal: Diagnostic Test/Procedures  Outcome: Progressing Towards Goal  Goal: Nutrition/Diet  Outcome: Progressing Towards Goal  Goal: Discharge Planning  Outcome: Progressing Towards Goal  Goal: Medications  Outcome: Progressing Towards Goal  Goal: Respiratory  Outcome: Progressing Towards Goal  Goal: Treatments/Interventions/Procedures  Outcome: Progressing Towards Goal  Goal: Psychosocial  Outcome: Progressing Towards Goal     Problem: Cath Lab Procedures: Discharge Outcomes  Goal: *Stable cardiac rhythm  Outcome: Progressing Towards Goal  Goal: *Hemodynamically stable  Outcome: Progressing Towards Goal  Goal: *Optimal pain control at patient's stated goal  Outcome: Progressing Towards Goal  Goal: *Pulses palpable, skin color within defined limits, skin temperature warm  Outcome: Progressing Towards Goal  Goal: *Lungs clear or at baseline  Outcome: Progressing Towards Goal  Goal: *Demonstrates ability to perform prescribed activity without shortness of breath or discomfort  Outcome: Progressing Towards Goal  Goal: *Verbalizes home exercise program, activity guidelines, cardiac precautions  Outcome: Progressing Towards Goal  Goal: *Verbalizes understanding and describes prescribed diet  Outcome: Progressing Towards Goal  Goal: *Verbalizes understanding and describes medication purposes and frequencies  Outcome: Progressing Towards Goal  Goal: *Identifies cardiac risk factors  Outcome: Progressing Towards Goal  Goal: *No signs and symptoms of infection or wound complications  Outcome: Progressing Towards Goal  Goal: *Anxiety reduced or absent  Outcome: Progressing Towards Goal  Goal: *Verbalizes and demonstrates incision care  Outcome: Progressing Towards Goal  Goal: *Understands and describes signs and symptoms to report to providers(Stroke Metric)  Outcome: Progressing Towards Goal  Goal: *Describes follow-up/return visits to physicians  Outcome: Progressing Towards Goal  Goal: *Describes available resources and support systems  Outcome: Progressing Towards Goal  Goal: *Influenza immunization  Outcome: Progressing Towards Goal  Goal: *Pneumococcal immunization  Outcome: Progressing Towards Goal     Problem: Pain  Goal: *Control of Pain  Outcome: Progressing Towards Goal  Goal: *PALLIATIVE CARE:  Alleviation of Pain  Outcome: Progressing Towards Goal     Problem: Patient Education: Go to Patient Education Activity  Goal: Patient/Family Education  Outcome: Progressing Towards Goal     Problem: Impaired Skin Integrity/Pressure Injury Treatment  Goal: *Prevention of pressure injury  Description: Document Samuel Scale and appropriate interventions in the flowsheet. Outcome: Progressing Towards Goal  Note: Pressure Injury Interventions: Activity Interventions: Assess need for specialty bed, Increase time out of bed         Nutrition Interventions: Document food/fluid/supplement intake                     Problem: Patient Education: Go to Patient Education Activity  Goal: Patient/Family Education  Outcome: Progressing Towards Goal     Problem: Falls - Risk of  Goal: *Absence of Falls  Description: Document Troy Crandall Fall Risk and appropriate interventions in the flowsheet. Outcome: Progressing Towards Goal  Note: Fall Risk Interventions:            Medication Interventions: Evaluate medications/consider consulting pharmacy, Patient to call before getting OOB    Elimination Interventions: Call light in reach, Toileting schedule/hourly rounds, Urinal in reach              Problem: Patient Education: Go to Patient Education Activity  Goal: Patient/Family Education  Outcome: Progressing Towards Goal     Problem: Pressure Injury - Risk of  Goal: *Prevention of pressure injury  Description: Document Samuel Scale and appropriate interventions in the flowsheet. Outcome: Progressing Towards Goal  Note: Pressure Injury Interventions:             Activity Interventions: Assess need for specialty bed, Increase time out of bed         Nutrition Interventions: Document food/fluid/supplement intake Problem: Patient Education: Go to Patient Education Activity  Goal: Patient/Family Education  Outcome: Progressing Towards Goal     Problem: AMI: Day 1  Goal: Off Pathway (Use only if patient is Off Pathway)  Outcome: Resolved/Met  Goal: Activity/Safety  Outcome: Resolved/Met  Goal: Consults, if ordered  Outcome: Resolved/Met  Goal: Diagnostic Test/Procedures  Outcome: Resolved/Met  Goal: Nutrition/Diet  Outcome: Resolved/Met  Goal: Discharge Planning  Outcome: Resolved/Met  Goal: Medications  Outcome: Resolved/Met  Goal: Respiratory  Outcome: Resolved/Met  Goal: Treatments/Interventions/Procedures  Outcome: Resolved/Met  Goal: Psychosocial  Outcome: Resolved/Met  Goal: *Eligible patient receives reperfusion therapy thrombolytics/PCI  Outcome: Resolved/Met  Goal: *Optimal pain control at patient's stated goal  Outcome: Resolved/Met  Goal: *Hemodynamically stable  Outcome: Resolved/Met  Goal: *Received aspirin and beta-blocker within 24 hours of arrival unless contraindicated  Outcome: Resolved/Met     Problem: AMI: Day 2  Goal: Off Pathway (Use only if patient is Off Pathway)  Outcome: Progressing Towards Goal  Goal: Activity/Safety  Outcome: Progressing Towards Goal  Goal: Consults, if ordered  Outcome: Progressing Towards Goal  Goal: Diagnostic Test/Procedures  Outcome: Progressing Towards Goal  Goal: Nutrition/Diet  Outcome: Progressing Towards Goal  Goal: Discharge Planning  Outcome: Progressing Towards Goal  Goal: Medications  Outcome: Progressing Towards Goal  Goal: Respiratory  Outcome: Progressing Towards Goal  Goal: Treatments/Interventions/Procedures  Outcome: Progressing Towards Goal  Goal: Psychosocial  Outcome: Progressing Towards Goal  Goal: *Optimal pain control at patient's stated goal  Outcome: Progressing Towards Goal  Goal: *Hemodynamically stable  Outcome: Progressing Towards Goal  Goal: *Demonstrates progressive activity  Outcome: Progressing Towards Goal  Goal: *Tolerating diet  Outcome: Progressing Towards Goal     Problem: AMI: Discharge Outcomes  Goal: *Demonstrates ability to perform prescribed activity without shortness of breath or discomfort  Outcome: Progressing Towards Goal  Goal: *Verbalizes understanding and describes prescribed diet  Outcome: Progressing Towards Goal  Goal: *Describes follow-up/return visits to physicians  Outcome: Progressing Towards Goal  Goal: *Describes home care/support arrangements established based on need  Outcome: Progressing Towards Goal  Goal: *Anxiety reduced or absent  Outcome: Progressing Towards Goal  Goal: *Understands and describes signs and symptoms to report to providers(Stroke Metric)  Outcome: Progressing Towards Goal  Goal: *Verbalizes name, dosage, time, side effects, and number of days to continue medications  Outcome: Progressing Towards Goal

## 2021-08-16 NOTE — H&P
Dr. Davide Waller. [phone]            Cardiology Consult/Progress Note      Requesting/referring provider: MD Dr. Kehinde Pereyra    Reason for Consult: Acute onset chest discomfort    Subjective: More NSVT overnight. But no syncope or lightheadedness. NO chest pain    Assessment/Plan:  1. Acute inferior ST elevation myocardial infarction  2. History of thyroid disease  3. Rheumatoid arthritis  4. Post MI NSVT    Mr. Joslyn Crowder is doing well after primary PCI of RCA for inferior STEMI. NO further chest pain. Short runs of NSVT but no sustained VT. Will closely monitor --may need increasing BB dose. Ambulated without any issues. Echo awaited. Will try to move out of CCU later today. Effient based DAPT to continue. BP is marginal for significant escalation of dose of other meds. []    High complexity decision making was performed  []    Patient is at high-risk of decompensation with multiple organ involvement  []    Complex/difficult social determinants of health outcomes    Investigations personally reviewed and interpreted  ECG from the ER was reviewed and shows sinus rhythm, inferior ST elevations, ST depressions reciprocally in lead aVL. Investigations reviewed    HPI: Della Betancourt, a 64y.o. year-old who is seen for evaluation of chest pain and management of STEMI. He has been reporting off-and-on stuttering chest discomfort for the past 2 weeks. He was previously seen in outpatient center where it was felt to be possibly gastroesophageal reflux. On August 14, 2021, around 2 PM he started having severe crushing chest discomfort with pressure and burning sensation in the chest which was continuous. This was associated with shortness of breath. The pain did radiate to the left shoulder. There was no associated syncope, nausea, vomiting, diaphoresis.   His past medical history significant for recently diagnosed rheumatoid arthritis for which he was treated with Enbrel and steroids and is on maintenance methotrexate. He has a family history of coronary disease in his brother as well as his father but details are unavailable. He  has a past medical history of GERD (gastroesophageal reflux disease) and Thyroid disease. Review of system:Patient reports no PND/Orthpnea. He reports no cough/fever/focal neurological deficits/abdominal pain. All other systems negative except as above. Family History   Problem Relation Age of Onset    Diabetes Brother       Social History     Socioeconomic History    Marital status:      Spouse name: Not on file    Number of children: Not on file    Years of education: Not on file    Highest education level: Not on file   Tobacco Use    Smoking status: Former Smoker     Quit date: 3/2/1985     Years since quittin.4    Smokeless tobacco: Never Used   Substance and Sexual Activity    Alcohol use: No     Alcohol/week: 0.0 standard drinks    Drug use: No    Sexual activity: Yes     Social Determinants of Health     Financial Resource Strain:     Difficulty of Paying Living Expenses:    Food Insecurity:     Worried About Running Out of Food in the Last Year:     [address] in the Last Year:    Transportation Needs:     Lack of Transportation (Medical):      Lack of Transportation (Non-Medical):    Physical Activity:     Days of Exercise per Week:     Minutes of Exercise per Session:    Stress:     Feeling of Stress :    Social Connections:     Frequency of Communication with Friends and Family:     Frequency of Social Gatherings with Friends and Family:     Attends Jain Services:     Active Member of Clubs or Organizations:     Attends Club or Organization Meetings:     Marital Status:       PE  Vitals:    21 0900 21 1000 21 1100 21 1200   BP: 116/68 117/76 137/74 109/73   Pulse: 62 (!) 58 (!) 59 (!) 58   Resp:    Temp:       SpO2: 99% 98% 100% 99%   Weight:       Height:        Body mass index is 26.55 kg/m². General:    Alert, cooperative, appears to be in distress due to pain. [facility]:    Normal Mood and affect    Eye/ENT:      Pupils equal, No asymmetry, Conjunctival pink. Able to hear voice at normal amplitude   Lungs:      Visibly symmetric chest expansion, No palpable tenderness. Clear to auscultation bilaterally. Heart[de-identified]    Regular rate and rhythm, S1, S2 normal, no murmur, click, rub or gallop. No JVD, Normal palpable peripheral pulses. No cyanosis   Abdomen:     Soft, non-tender. Bowel sounds normal. No masses,  No      organomegaly. Extremities:   Extremities normal, atraumatic, no edema. Neurologic:   CN II-XII grossly intact.  No gross focal deficits           Recent Labs:  Lab Results   Component Value Date/Time    Cholesterol, total 181 08/15/2021 04:06 AM    HDL Cholesterol 53 08/15/2021 04:06 AM    LDL, calculated 105.2 (H) 08/15/2021 04:06 AM    Triglyceride 114 08/15/2021 04:06 AM    CHOL/HDL Ratio 3.4 08/15/2021 04:06 AM     Lab Results   Component Value Date/Time    Creatinine 0.85 2021 04:22 AM     Lab Results   Component Value Date/Time    BUN 12 2021 04:22 AM     Lab Results   Component Value Date/Time    Potassium 3.9 2021 04:22 AM     Lab Results   Component Value Date/Time    Hemoglobin A1c 5.7 (H) 2017 08:44 AM     Lab Results   Component Value Date/Time    HGB 11.1 (L) 08/15/2021 04:06 AM     Lab Results   Component Value Date/Time    PLATELET 936  04:06 AM       Reviewed:  Past Medical History:   Diagnosis Date    GERD (gastroesophageal reflux disease)     Thyroid disease      Social History     Tobacco Use   Smoking Status Former Smoker    Quit date: 3/2/1985    Years since quittin.4   Smokeless Tobacco Never Used     Social History     Substance and Sexual Activity   Alcohol Use No    Alcohol/week: 0.0 standard drinks     No Known Allergies  Family History   Problem Relation Age of Onset    Diabetes Brother         Current Facility-Administered Medications   Medication Dose Route Frequency    promethazine (PHENERGAN) tablet 25 mg  25 mg Oral Q6H PRN    sodium chloride (NS) flush 5-40 mL  5-40 mL IntraVENous Q8H    sodium chloride (NS) flush 5-40 mL  5-40 mL IntraVENous PRN    aspirin chewable tablet 81 mg  81 mg Oral DAILY    prasugreL (EFFIENT) tablet 10 mg  10 mg Oral DAILY    rosuvastatin (CRESTOR) tablet 20 mg  20 mg Oral QHS    metoprolol tartrate (LOPRESSOR) tablet 25 mg  25 mg Oral Q12H    levothyroxine (SYNTHROID) tablet 100 mcg  100 mcg Oral DAILY    cetirizine (ZYRTEC) tablet 10 mg  10 mg Oral DAILY    lisinopriL (PRINIVIL, ZESTRIL) tablet 5 mg  5 mg Oral DAILY    ondansetron (ZOFRAN) injection 4 mg  4 mg IntraVENous Q4H PRN       Mckinley Cartwright MD08/16/21     ATTENTION:   This medical record was transcribed using an electronic medical records/speech recognition system. Although proofread, it may and can contain electronic, spelling and other errors. Corrections may be executed at a later time. Please feel free to contact us for any clarifications as needed.     [facility] [place]  [facility], [employer],  [place], [place]. [phone]

## 2021-08-16 NOTE — PROGRESS NOTES
8/15/2021   Milton Avalos MD  [facility]    . Ghanshyam Quinteross Ghanshyam Neighbours Ghanshyam Quinteross Juli Becerra is a 64 y.o. male   Has chronic issues with nausea and vomiting denies any symptoms overnight felt well. He has no murmur. He was put on Phenergan which he says works better than Zofran . 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 He is applying for disability  Patient Vitals for the past 12 hrs:   Temp Pulse Resp BP SpO2   08/15/21 2200 -- (!) 56 15 123/75 98 %   08/15/21 2100 -- 61 19 105/70 99 %   08/15/21 2000 98.5 °F (36.9 °C) (!) 58 15 121/75 100 %   08/15/21 1900 -- (!) 59 19 113/78 97 %   08/15/21 1800 -- (!) 59 17 131/75 99 %   08/15/21 1700 -- (!) 59 15 (!) 118/57 99 %   08/15/21 1600 98.2 °F (36.8 °C) (!) 56 20 133/74 100 %   08/15/21 1500 -- (!) 51 15 120/78 99 %   08/15/21 1400 -- (!) 50 19 129/78 100 %   08/15/21 1300 -- 62 15 139/78 --   08/15/21 1200 98 °F (36.7 °C) (!) 54 20 136/87 97 %   08/15/21 1100 -- 62 16 109/89 99 %      Discussion/Plans/Recs  1. Inferior wall STEMI with proximal % stenosis underwent Xience PALMER in proximal LAD yesterday. Has no chest pain today. Continue guideline directed medical therapy. He had sudden onset of pain on the day of arrival but had been feeling some discomfort during the past couple of weeks when working in the yard. Family history of coronary disease in brother and father    Medical therapy with aspirin lisinopril metoprolol and Effient along with Crestor  Lab Results   Component Value Date/Time    LDL, calculated 105.2 (H) 08/15/2021 04:06 AM      Lab Results   Component Value Date/Time    HGB 11.1 (L) 08/15/2021 04:06 AM       2. Moderate mild disease in LAD and circumflex. 3.  Marked hypertension on admission  Continue beta-blocker and ACE  4.   Chronic nausea and vomiting better with Phenergan, GERD    5. Rheumatoid arthritis  Enbrel  6. History of thyroid disease   Repeat TSH in am  Lab Results   Component Value Date/Time    TSH 1.890 12/13/2018 02:28 PM         Cardiac Studies/Hx:  No specialty comments available. Past Medical History:   Diagnosis Date    GERD (gastroesophageal reflux disease)     Thyroid disease       ROS-pertinents  negative except as above  The pertinent portions of the medical history,physician and nursing notes, meds,vitals , labs and Ins/Outs,are reviewed in the electronic record.   Results for orders placed or performed during the hospital encounter of 08/14/21   EKG, 12 LEAD, INITIAL   Result Value Ref Range    Ventricular Rate 69 BPM    Atrial Rate 69 BPM    P-R Interval 150 ms    QRS Duration 96 ms    Q-T Interval 388 ms    QTC Calculation (Bezet) 415 ms    Calculated P Axis 58 degrees    Calculated R Axis -29 degrees    Calculated T Axis -9 degrees    Diagnosis       Sinus rhythm with premature ventricular complexes  Inferior infarct , possibly acute  ** ACUTE MI **  No previous ECGs available  Confirmed by [facility] Batch ([id number]) on 8/15/2021 3:51:10 PM               Objective:    Physical Exam:   /75   Pulse (!) 56   Temp 98.5 °F (36.9 °C)   Resp 15   Ht 5' 8\" (1.727 m)   Wt 174 lb 9.7 oz (79.2 kg)   SpO2 98%   BMI 26.55 kg/m²    General:  alert, cooperative, no distress, appears stated age   ENT, Neck:  no jvd   Chest Wall: inspection normal - no chest wall deformities or tenderness, respiratory effort normal   Lung: clear to auscultation bilaterally   Heart:  normal rate, regular rhythm, normal S1, S2, no murmurs, rubs, clicks or gallops   Abdomen: nondistended   Extremities: extremities normal, atraumatic, no cyanosis or edema     Patient Vitals for the past 12 hrs:   Temp Pulse Resp BP SpO2   08/15/21 2200 -- (!) 56 15 123/75 98 %   08/15/21 2100 -- 61 19 105/70 99 %   08/15/21 2000 98.5 °F (36.9 °C) (!) 58 15 121/75 100 % 08/15/21 1900 -- (!) 59 19 113/78 97 %   08/15/21 1800 -- (!) 59 17 131/75 99 %   08/15/21 1700 -- (!) 59 15 (!) 118/57 99 %   08/15/21 1600 98.2 °F (36.8 °C) (!) 56 20 133/74 100 %   08/15/21 1500 -- (!) 51 15 120/78 99 %   08/15/21 1400 -- (!) 50 19 129/78 100 %   08/15/21 1300 -- 62 15 139/78 --   08/15/21 1200 98 °F (36.7 °C) (!) 54 20 136/87 97 %   08/15/21 1100 -- 62 16 109/89 99 %      Lab Results   Component Value Date/Time    WBC 9.7 08/15/2021 04:06 AM    HGB 11.1 (L) 08/15/2021 04:06 AM    HCT 34.5 (L) 08/15/2021 04:06 AM    PLATELET 183 52/73/8993 04:06 AM    MCV 96.4 08/15/2021 04:06 AM     Lab Results   Component Value Date/Time    Sodium 139 08/15/2021 04:06 AM    Potassium 4.3 08/15/2021 04:06 AM    Chloride 108 08/15/2021 04:06 AM    CO2 26 08/15/2021 04:06 AM    Anion gap 5 08/15/2021 04:06 AM    Glucose 137 (H) 08/15/2021 04:06 AM    BUN 11 08/15/2021 04:06 AM    Creatinine 0.83 08/15/2021 04:06 AM    BUN/Creatinine ratio 13 08/15/2021 04:06 AM    GFR est AA >60 08/15/2021 04:06 AM    GFR est non-AA >60 08/15/2021 04:06 AM    Calcium 8.3 (L) 08/15/2021 04:06 AM     Lab Results   Component Value Date/Time    Troponin-I, Qt. <0.05 08/14/2021 02:55 PM     No results found for: BNP, BNPP, BNPPPOC, XBNPT, BNPNT    reports that he quit smoking about 36 years ago. He has never used smokeless tobacco. He reports that he does not drink alcohol and does not use drugs. family history includes Diabetes in his brother.    Last 24hr Input/Output:    Intake/Output Summary (Last 24 hours) at 8/15/2021 2258  Last data filed at 8/15/2021 1800  Gross per 24 hour   Intake 700 ml   Output 650 ml   Net 50 ml        Data Review:   Lab Results   Component Value Date/Time    WBC 9.7 08/15/2021 04:06 AM    HGB 11.1 (L) 08/15/2021 04:06 AM    HCT 34.5 (L) 08/15/2021 04:06 AM    PLATELET 546 14/51/0695 04:06 AM    MCV 96.4 08/15/2021 04:06 AM     Lab Results   Component Value Date/Time    Sodium 139 08/15/2021 04:06 AM Potassium 4.3 08/15/2021 04:06 AM    Chloride 108 08/15/2021 04:06 AM    CO2 26 08/15/2021 04:06 AM    Anion gap 5 08/15/2021 04:06 AM    Glucose 137 (H) 08/15/2021 04:06 AM    BUN 11 08/15/2021 04:06 AM    Creatinine 0.83 08/15/2021 04:06 AM    BUN/Creatinine ratio 13 08/15/2021 04:06 AM    GFR est AA >60 08/15/2021 04:06 AM    GFR est non-AA >60 08/15/2021 04:06 AM    Calcium 8.3 (L) 08/15/2021 04:06 AM    Bilirubin, total 0.4 08/15/2021 04:06 AM    Alk. phosphatase 52 08/15/2021 04:06 AM    Protein, total 6.0 (L) 08/15/2021 04:06 AM    Albumin 3.2 (L) 08/15/2021 04:06 AM    Globulin 2.8 08/15/2021 04:06 AM    A-G Ratio 1.1 08/15/2021 04:06 AM    ALT (SGPT) 88 (H) 08/15/2021 04:06 AM    AST (SGOT) 405 (H) 08/15/2021 04:06 AM     Lab Results   Component Value Date/Time    Troponin-I, Qt. <0.05 08/14/2021 02:55 PM     No results found for: BNP, BNPP, BNPPPOC, XBNPT, BNPNT    Recent Results (from the past 24 hour(s))   METABOLIC PANEL, COMPREHENSIVE    Collection Time: 08/15/21  4:06 AM   Result Value Ref Range    Sodium 139 136 - 145 mmol/L    Potassium 4.3 3.5 - 5.1 mmol/L    Chloride 108 97 - 108 mmol/L    CO2 26 21 - 32 mmol/L    Anion gap 5 5 - 15 mmol/L    Glucose 137 (H) 65 - 100 mg/dL    BUN 11 6 - 20 MG/DL    Creatinine 0.83 0.70 - 1.30 MG/DL    BUN/Creatinine ratio 13 12 - 20      GFR est AA >60 >60 ml/min/1.73m2    GFR est non-AA >60 >60 ml/min/1.73m2    Calcium 8.3 (L) 8.5 - 10.1 MG/DL    Bilirubin, total 0.4 0.2 - 1.0 MG/DL    ALT (SGPT) 88 (H) 12 - 78 U/L    AST (SGOT) 405 (H) 15 - 37 U/L    Alk.  phosphatase 52 45 - 117 U/L    Protein, total 6.0 (L) 6.4 - 8.2 g/dL    Albumin 3.2 (L) 3.5 - 5.0 g/dL    Globulin 2.8 2.0 - 4.0 g/dL    A-G Ratio 1.1 1.1 - 2.2     CBC WITH AUTOMATED DIFF    Collection Time: 08/15/21  4:06 AM   Result Value Ref Range    WBC 9.7 4.1 - 11.1 K/uL    RBC 3.58 (L) 4.10 - 5.70 M/uL    HGB 11.1 (L) 12.1 - 17.0 g/dL    HCT 34.5 (L) 36.6 - 50.3 %    MCV 96.4 80.0 - 99.0 FL    MCH 31.0 26.0 - 34.0 PG    MCHC 32.2 30.0 - 36.5 g/dL    RDW 14.1 11.5 - 14.5 %    PLATELET 112 527 - 008 K/uL    MPV 10.3 8.9 - 12.9 FL    NRBC 0.0 0  WBC    ABSOLUTE NRBC 0.00 0.00 - 0.01 K/uL    NEUTROPHILS 69 32 - 75 %    LYMPHOCYTES 20 12 - 49 %    MONOCYTES 10 5 - 13 %    EOSINOPHILS 1 0 - 7 %    BASOPHILS 0 0 - 1 %    IMMATURE GRANULOCYTES 0 0.0 - 0.5 %    ABS. NEUTROPHILS 6.6 1.8 - 8.0 K/UL    ABS. LYMPHOCYTES 2.0 0.8 - 3.5 K/UL    ABS. MONOCYTES 1.0 0.0 - 1.0 K/UL    ABS. EOSINOPHILS 0.1 0.0 - 0.4 K/UL    ABS. BASOPHILS 0.0 0.0 - 0.1 K/UL    ABS. IMM. GRANS. 0.0 0.00 - 0.04 K/UL    DF AUTOMATED     LIPID PANEL    Collection Time: 08/15/21  4:06 AM   Result Value Ref Range    Cholesterol, total 181 <200 MG/DL    Triglyceride 114 <150 MG/DL    HDL Cholesterol 53 MG/DL    LDL, calculated 105.2 (H) 0 - 100 MG/DL    VLDL, calculated 22.8 MG/DL    CHOL/HDL Ratio 3.4 0.0 - 5.0            No future appointments.      Milton Avalos MD 8/15/2021

## 2021-08-16 NOTE — CARDIO/PULMONARY
Cardiac Rehab: MI education folder to bedside of Kaila Lance. Educated using teach back method. Reviewed MI diagnosis definition and purpose of intervention. Discussed risk factors for CAD to include the following: family history, elevated BMI, hyperlipidemia, hypertension, diabetes, stress, and smoking. . Discussed Heart Healthy/Low Sodium (2000 mg) diet. Reviewed the importance of medication compliance, the purpose of EFFIENT, and potential side effects. Discussed follow up appointments with cardiologist, signs and symptoms of angina, and what to report to physician after discharge. Emphasized the value of cardiac rehab. Discussed Cardiac Rehab Program format, benefits, and encouraged enrollment to assist with risk modification and management. Kaila Lance is currently awaiting medicaid approval for disability related to Rheumatoid arthritis and there fore uninsured. CM will visit today to provide Care Card application. [facility] CR contact information is now on his AVS.Khai Milescristino will call to discuss enrollment once he is insured. Kaila Lance verbalized understanding with questions answered.   Tonny Banks RN

## 2021-08-16 NOTE — PROGRESS NOTES
1930 received bedside report from [facility] VSS, R Radial cath site without hematoma/bleeding. No complaints at this time    0000 no change on reassessment    0430 labs sent per order    0730 Bedside shift change report given to Sharron RN (oncoming nurse) by Tierra Rosen RN (offgoing nurse). Report included the following information SBAR, Kardex, ED Summary, Procedure Summary, Intake/Output, MAR, Recent Results and Cardiac Rhythm Sinus Quay Lime.

## 2021-08-16 NOTE — CARDIO/PULMONARY
[facility] Cardiopulmonary Rehab: 63 yo admitted with Chest Pain. Per Dr Petey Blum, dx includes: acute inferior STEMI. S/P emergent PTCA with PALMER to RCA (8/14). LVEF 45-50% by echo (8/16/21). Per chart review, patient resides with his wife in [place]. CAD risk factors: HTN, HLD, family hx, age & gender. Pt is a former smoker (quit 1985). Patient was transferred to [facility] following PCI, due to lack of available inpatient bed. He will be followed by Cardiac Rehab RN at [facility].     8/17/21 UPDATE: Preliminary report on PCI was to LAD, this has been corrected (see final cath report). Post-PCI nonsustained VT has been noted.  [facility] Cardiac Rehab RN will contact pt by telephone following discharge from [facility] to discuss eligibility for outpatient cardiac rehab program.

## 2021-08-17 ENCOUNTER — TELEPHONE (OUTPATIENT)
Dept: FAMILY MEDICINE CLINIC | Age: 61
End: 2021-08-17

## 2021-08-17 VITALS
DIASTOLIC BLOOD PRESSURE: 67 MMHG | HEART RATE: 68 BPM | HEIGHT: 68 IN | SYSTOLIC BLOOD PRESSURE: 110 MMHG | OXYGEN SATURATION: 98 % | WEIGHT: 158.73 LBS | TEMPERATURE: 97.8 F | BODY MASS INDEX: 24.06 KG/M2 | RESPIRATION RATE: 25 BRPM

## 2021-08-17 LAB
ATRIAL RATE: 56 BPM
CALCULATED P AXIS, ECG09: 0 DEGREES
CALCULATED R AXIS, ECG10: 60 DEGREES
CALCULATED T AXIS, ECG11: 52 DEGREES
DIAGNOSIS, 93000: NORMAL
P-R INTERVAL, ECG05: 108 MS
Q-T INTERVAL, ECG07: 390 MS
QRS DURATION, ECG06: 106 MS
QTC CALCULATION (BEZET), ECG08: 376 MS
VENTRICULAR RATE, ECG03: 56 BPM

## 2021-08-17 PROCEDURE — 74011250637 HC RX REV CODE- 250/637: Performed by: INTERNAL MEDICINE

## 2021-08-17 RX ORDER — PRASUGREL 10 MG/1
10 TABLET, FILM COATED ORAL DAILY
Qty: 90 TABLET | Refills: 3 | Status: SHIPPED | OUTPATIENT
Start: 2021-08-17 | End: 2022-08-08 | Stop reason: SDUPTHER

## 2021-08-17 RX ORDER — ROSUVASTATIN CALCIUM 20 MG/1
20 TABLET, COATED ORAL
Qty: 90 TABLET | Refills: 3 | Status: SHIPPED | OUTPATIENT
Start: 2021-08-17

## 2021-08-17 RX ORDER — GUAIFENESIN 100 MG/5ML
81 LIQUID (ML) ORAL DAILY
Qty: 90 TABLET | Refills: 3 | Status: SHIPPED | OUTPATIENT
Start: 2021-08-17

## 2021-08-17 RX ORDER — LISINOPRIL 10 MG/1
10 TABLET ORAL DAILY
Status: DISCONTINUED | OUTPATIENT
Start: 2021-08-17 | End: 2021-08-17 | Stop reason: HOSPADM

## 2021-08-17 RX ORDER — LISINOPRIL 10 MG/1
10 TABLET ORAL DAILY
Qty: 90 TABLET | Refills: 1 | Status: SHIPPED | OUTPATIENT
Start: 2021-08-17

## 2021-08-17 RX ORDER — METOPROLOL TARTRATE 25 MG/1
25 TABLET, FILM COATED ORAL EVERY 12 HOURS
Qty: 60 TABLET | Refills: 2 | Status: SHIPPED | OUTPATIENT
Start: 2021-08-17 | End: 2021-12-03 | Stop reason: SDUPTHER

## 2021-08-17 RX ADMIN — Medication 10 ML: at 06:58

## 2021-08-17 RX ADMIN — LEVOTHYROXINE SODIUM 100 MCG: 0.1 TABLET ORAL at 06:58

## 2021-08-17 RX ADMIN — PRASUGREL 10 MG: 10 TABLET, FILM COATED ORAL at 09:30

## 2021-08-17 RX ADMIN — LISINOPRIL 10 MG: 10 TABLET ORAL at 09:30

## 2021-08-17 RX ADMIN — ASPIRIN 81 MG: 81 TABLET, CHEWABLE ORAL at 09:29

## 2021-08-17 RX ADMIN — METOPROLOL TARTRATE 25 MG: 25 TABLET, FILM COATED ORAL at 09:30

## 2021-08-17 NOTE — TELEPHONE ENCOUNTER
----- Message from Rene Gutiérrez sent at 8/17/2021  9:56 AM EDT -----  Regarding: /telephone  Contact: [phone]  General Message/Vendor Calls    Caller's first and last name: Iliana Oropeza with        Reason for call: [facility] appt patient is getting discharged today 08/17/21 dx ST elevation mild cardio infarction.        Callback required yes/no and why: Yes      Best contact number(s): [phone]       Details to clarify the request: N/a      Rene Gutiérrez

## 2021-08-17 NOTE — PROGRESS NOTES
Attempted to schedule hospital follow up PCP appointment. Office nurse with [address] will contact the patient with appointment information as her primary care physician Dr. Jaylyn Ribera is no longer with the practice.   Irma Barber, Care Management Specialist.

## 2021-08-17 NOTE — DISCHARGE INSTRUCTIONS
Radial Cardiac Catheterization/Angiography Discharge Instructions    It is normal to feel tired the first couple days. Take it easy and follow the physicians instructions. CHECK THE CATHETER INSERTION SITE DAILY:    Remove the wrist dressing 24 hours after the procedure. You may shower 24 hours after the procedure. Wash with soap and water and pat dry. Gentle cleaning of the site with soap and water is sufficient, cover with a dry clean dressing or bandage. Do not apply creams or powders to the area. No soaking the wrist for 3 days. Leave the puncture site open to air after 24 hours post-procedure. CALL THE PHYSICIANS:     If the site becomes red, swollen or feels warm to the touch  If there is bleeding or drainage or if there is unusual pain at the radial site. If there is any minor oozing, you may apply a band-aid and remove after 12 hours. If the bleeding continues, hold pressure with the middle finger against the puncture site and the thumb against the back of the wrist,call 911 to be transported to the hospital.  DO NOT DRIVE YOURSELF, [place] [id number]. ACTIVITY:   For the first 24 hours do not manipulate the wrist.  No lifting, pushing or pulling over 3-5 pounds with the affected wrist for 7 daysand no straining the insertion site. Do not life grocery bags or the garbage can, do not run the vacuum  or  for 7 days. Start with short walks as in the hospital and gradually increase as tolerated each day. It is recommended to walk 30 minutes 5-7 days per week. Follow your physicians instructions on activity. Avoid walking outside in extremes of heat or cold. Walk inside when it is cold and windy or hot and humid. Things to keep in mind:  No driving for at least 24 hours, or as designated by your physician. Limit the number of times you go up and down the stairs  Take rests and pace yourself with activity.   Be careful and do not strain with bowel movements. MEDICATIONS:    Take all medications as prescribed  Call your physician if you have any questions  Keep an updated list of your medications with you at all times and give a list to your physician and pharmacist    SIGNS AND SYMPTOMS:   Be cautious of symptoms of angina or recurrent symptoms such as chest discomfort, unusual shortness of breath or fatigue. These could be symptoms of restenosis, a new blockage or a heart attack. If your symptoms are relieved with rest it is still recommended that you notify your physician of recurrent chest pain or discomfort. For CHEST PAIN or symptoms of angina not relieved with rest:  If the discomfort is not relieved with rest, call 911. AFTER CARE:   Follow up with your physician as instructed. Follow a heart healthy diet with proper portion control, daily stress management, daily exercise, blood pressure and cholesterol control , and smoking cessation.

## 2021-08-17 NOTE — PROGRESS NOTES
Discharge summary, instructions, medications, appointments, and post procedure instructions reviewed with patient and wife. Stent card sent with patient. Pt educated about s/s of hypotension; printed educational material given to patient. Patient and wife have no further questions at this time and will call the numbers provided if any arise. PIVs removed with tips intact; telemetry discontinued. Patient wheeled to front of hospital for discharge with all belongings.

## 2021-08-17 NOTE — DISCHARGE SUMMARY
Cardiology Discharge Summary     Patient ID:  Maikol Bloodgood  [id number]  79 y.o.  1960    Admit Date: 8/14/2021    Discharge Date: 8/17/2021    Admitting Physician: Carlie Jackson MD     Discharge Physician: Carlie Jackson MD    Admission Diagnoses:   STEMI (ST elevation myocardial infarction) [facility]) [I21.3]    Discharge Diagnoses: Active Problems:    STEMI (ST elevation myocardial infarction) ([address] (8/14/2021)        Discharge Condition: Good    Cardiology Procedures this Admission:  Primary PCI/PALMER to proximal RCA (see below for details)    Consults: None    Hospital Course:Khai Sun, a 64y.o. year-old was presented after experiencing off-and-on stuttering chest discomfort for 2 weeks. Then on developed severe crushing chest discomfort and was found to have acute inferior ST elevation MI upon presentation to ER. HE undetwent emergent PCI/PALMER to proximal RCA. He has short runs of NSVT post procedure but none by day of discharge. He had no chest pain on day of discharge. He will be maintained on Effient based DAPT as well as DAPT. Low dose lopressor and high intensity statin. His echo showed borderline reduced LV function (EF 45-50%). His BP is marginal for signficant GDMT for LV dysfunction. He was started on lisinopril 10 mg daily. He lives in [place] and prefers to follow up with cardiologist at [facility]. Follow up was arranged with Dr. Malu Jackson. Future Appointments   Date Time Provider James Jie   9/8/2021  2:00 PM Ellyn Benson MD [facility] BS AMB          Visit Vitals  /75   Pulse 67   Temp 98.3 °F (36.8 °C)   Resp 14   Ht 5' 8\" (1.727 m)   Wt 158 lb 11.7 oz (72 kg)   SpO2 96%   BMI 24.14 kg/m²       Physical Exam  Abdomen: soft, non-tender. Bowel sounds normal. No masses,  no organomegaly  Extremities: no LE edema, + PP bilaterally Radial access site with no bleeding or hematoma.    Heart: regular rate and rhythm, S1, S2 normal, no murmurs, clicks, rubs or gallops  Lungs: clear to auscultation bilaterally  Neck: supple, symmetrical, trachea midline, no adenopathy, no JVD, no carotid bruits  Neurologic: Grossly normal  Pulses: 2+ and symmetrical    Labs:   Recent Labs     08/15/21  0406 08/14/21  1455   WBC 9.7 15.7*   HGB 11.1* 13.0   HCT 34.5* 39.5    294     Recent Labs     08/16/21  0422 08/15/21  0406 08/14/21  1455    139 137   K 3.9 4.3 3.7   * 108 106   CO2 26 26 27   * 137* 164*   BUN 12 11 11   CREA 0.85 0.83 0.96   CA 9.0 8.3* 8.9   MG  --   --  2.1   ALB  --  3.2* 3.6   ALT  --  88* 32       Recent Labs     08/14/21  1455   TROIQ <0.05       EKG:   CXR:   Other Diagnostic Tests:   08/14/21    ECHO ADULT COMPLETE 08/16/2021 8/16/2021    Interpretation Summary  · LV: Estimated LVEF is 45 - 50%. Normal wall thickness. Mildly dilated left ventricle. Wall motion: unable to assess due to limited image quality. · MV: Mild mitral valve regurgitation is present. · Image quality for this study was poor. Signed by: Carter Armando MD on 8/16/2021  4:58 PM      08/14/21    CARDIAC PROCEDURE 08/16/2021 8/16/2021    Conclusion  Findings  1. Acute inferior myocardial infarction with culprit proximal RCA  2.  Status post primary PCI of proximal RCA with placement of 3.25 x 15 drug-eluting stent postdilated to 3.75 noncompliant  3. Distal embolization in PDA and mid PL initially managed with low pressure balloon angioplasty with intracoronary Integrilin. This cleared up except for in very distal portions of LPL and L PDA where there was still some residual thrombus. Final flow was MARCELLO-3 in both PDA and PL  4. Mild diffuse disease in proximal to mid LAD. Type II LAD which barely reaches the apex  5. Increased LVEDP--21 mm Hg. Access right radial no issues  Contrast 70 cc    Recommendations  1. Effient based dual antiplatelet therapy  2. Guideline directed medical therapy for CAD  3.   Close monitoring in CCU    Signed by: Deon Rodriguez MD on 8/16/2021 2:02 PM        Device check:     Disposition: Home    Patient Instructions:   Current Discharge Medication List      START taking these medications    Details   aspirin 81 mg chewable tablet Take 1 Tablet by mouth daily. Qty: 90 Tablet, Refills: 3      lisinopriL (PRINIVIL, ZESTRIL) 10 mg tablet Take 1 Tablet by mouth daily. Qty: 90 Tablet, Refills: 1      metoprolol tartrate (LOPRESSOR) 25 mg tablet Take 1 Tablet by mouth every twelve (12) hours. Qty: 60 Tablet, Refills: 2      prasugreL (EFFIENT) 10 mg tablet Take 1 Tablet by mouth daily. Qty: 90 Tablet, Refills: 3      rosuvastatin (CRESTOR) 20 mg tablet Take 1 Tablet by mouth nightly. Qty: 90 Tablet, Refills: 3         CONTINUE these medications which have NOT CHANGED    Details   methotrexate (RHEUMATREX) 2.5 mg tablet Take 25 mg by mouth every Monday. Indications: rheumatoid arthritis      levothyroxine (SYNTHROID) 100 mcg tablet TAKE ONE TABLET BY MOUTH DAILY BEFORE BREAKFAST  Qty: 90 Tab, Refills: 0    Associated Diagnoses: Acquired hypothyroidism      promethazine (PHENERGAN) 25 mg tablet TAKE ONE TABLET BY MOUTH ONCE NIGHTLY  Qty: 30 Tab, Refills: 2      cetirizine (ZYRTEC) 10 mg tablet Take 1 Tab by mouth daily. Qty: 90 Tab, Refills: 1             Reference discharge instructions provided by nursing for diet and activity. Follow-up with   Future Appointments   Date Time Provider James Ceron   9/8/2021  2:00 PM Magan Benson MD [facility] AMB       Signed:  Tony Liriano.  DANILO Collado

## 2021-08-17 NOTE — PROGRESS NOTES
1910: TRANSFER - IN REPORT:    Verbal report received from [place], RN(name) on [facility]  being received from CCU(unit) for routine progression of care      Report consisted of patients Situation, Background, Assessment and   Recommendations(SBAR). Information from the following report(s) SBAR, Kardex, Intake/Output, MAR, Recent Results and Cardiac Rhythm NSR was reviewed with the receiving nurse. Opportunity for questions and clarification was provided. Assessment completed upon patients arrival to unit and care assumed. 1930: Bedside and Verbal shift change report given to [place] (oncoming nurse) by Ingris Jaimes (offgoing nurse). Report included the following information SBAR, Kardex, Intake/Output, MAR, Accordion, Recent Results and Cardiac Rhythm NSR.

## 2021-08-17 NOTE — PROGRESS NOTES
Transitions of Care Plan:  RUR:  10%  Clinical Plan: discharge  Consults: None  Baseline: independent without DME; resides with wife; self-pay  Disposition: home with wife    CM provided financial care application and GoodRx to self-pay patient prior to discharge.     Moncho Vogt, MPH  Care Manager l [employer]  Available via [address] [place] [address] or

## 2021-09-08 ENCOUNTER — OFFICE VISIT (OUTPATIENT)
Dept: CARDIOLOGY CLINIC | Age: 61
End: 2021-09-08

## 2021-09-08 VITALS
SYSTOLIC BLOOD PRESSURE: 110 MMHG | HEART RATE: 70 BPM | DIASTOLIC BLOOD PRESSURE: 64 MMHG | OXYGEN SATURATION: 96 % | BODY MASS INDEX: 24.55 KG/M2 | HEIGHT: 68 IN | WEIGHT: 162 LBS

## 2021-09-08 DIAGNOSIS — I21.11 ST ELEVATION MYOCARDIAL INFARCTION INVOLVING RIGHT CORONARY ARTERY (HCC): Primary | ICD-10-CM

## 2021-09-08 DIAGNOSIS — I10 ESSENTIAL HYPERTENSION: ICD-10-CM

## 2021-09-08 DIAGNOSIS — Z76.89 ESTABLISHING CARE WITH NEW DOCTOR, ENCOUNTER FOR: ICD-10-CM

## 2021-09-08 DIAGNOSIS — E78.5 DYSLIPIDEMIA: ICD-10-CM

## 2021-09-08 PROCEDURE — 93000 ELECTROCARDIOGRAM COMPLETE: CPT | Performed by: INTERNAL MEDICINE

## 2021-09-08 PROCEDURE — 99214 OFFICE O/P EST MOD 30 MIN: CPT | Performed by: INTERNAL MEDICINE

## 2021-09-08 RX ORDER — PREDNISONE 10 MG/1
10 TABLET ORAL DAILY
COMMUNITY
Start: 2021-03-18 | End: 2021-11-29

## 2021-09-08 RX ORDER — ETANERCEPT 25 MG/.5ML
50 SOLUTION SUBCUTANEOUS
COMMUNITY

## 2021-09-08 NOTE — PROGRESS NOTES
All orders entered per verbal orders of Dr. Cat Charles     See Dr. Tonya Nascimento in 6 months with same day Echo

## 2021-09-08 NOTE — PROGRESS NOTES
Patient marked  previously; however, very much alive and in office today. Patient marked as \"Alive\" per VO of Dr. Esthela Pack.

## 2021-09-08 NOTE — PROGRESS NOTES
Latisha Juarez is a 64 y.o. male    Chief Complaint   [facility]   [facility]     ED 8/14 STEMI      PCP was Micki Rodriguez with [facility]  [phone]    Chest pain No    SOB No    Dizziness No    Swelling No    Refills No    Visit Vitals  /64 (BP 1 Location: Left upper arm, BP Patient Position: Sitting)   Pulse 70   Ht 5' 8\" (1.727 m)   Wt 162 lb (73.5 kg)   SpO2 96%   BMI 24.63 kg/m²       1. Have you been to the ER, urgent care clinic since your last visit? Hospitalized since your last visit? 8/14    2. Have you seen or consulted any other health care providers outside of the [address] [place] [address] since your last visit? Include any pap smears or colon screening.   No

## 2021-09-08 NOTE — PROGRESS NOTES
Office Follow-up    NAME: Ricky Crespo   :  1960  MRM:  [id number]    Date:  2021            Assessment:     Problem List  Date Reviewed: 2018        Codes Class Noted    STEMI (ST elevation myocardial infarction) ([facility] [address] ICD-10-CM: I21.3  ICD-9-CM: 410.90  2021        Acquired hypothyroidism ICD-10-CM: E03.9  ICD-9-CM: 244.9  3/22/2016        Other and unspecified derangement of medial meniscus ICD-10-CM: M23.305  ICD-9-CM: 717.3  2011                 Plan:     1. ST elevation MI status post PCI RCA (2021): Residual mild to moderate LAD disease. Continue medical management. Continue aspirin, Effient, statins, beta-blocker. He does not have insurance and is currently getting medications via free clinic in [place]. Referal for cardiac rehab has been made and he is trying to figure out how he can get it scheduled withotu insurance. 2. Hypertension: Blood pressure is controlled. Continue metoprolol and lisinopril. 3. Dyslipidemia: Continue Crestor. Goal LDL 70 or below. 4. History of rheumatoid arthritis. Currently being treated with Enbrel, prednisone. 5. See Dr. Verenice Keyes in 6 months with same day Echo. ATTENTION:   This medical record was transcribed using an electronic medical records/speech recognition system. Although proofread, it may and can contain electronic, spelling and other errors. Corrections may be executed at a later time. Please feel free to contact us for any clarifications as needed. Subjective:     Ricky Crespo, a 64y.o. year-old who presents for followup. He was recently admitted to the hospital on 2021 with ST elevation MI. He underwent PCI to the RCA. He has residual mild to moderate disease of the LAD. His LVEF was at 40 to 45% with inferior wall motion abnormalities.   He had intermittent nonsustained ventricular tachycardia peripheral to ST elevation MI however close to discharge he did not have any significant arrhythmias. He was discharged home. He has not returned to work yet. He laced house. Denies tobacco smoking. He has been active walking every day without any symptoms of chest pain, shortness of breath, lightheadedness, dizziness, or peripheral edema. He has not enrolled in cardiac rehab. Exam:     Physical Exam:  Visit Vitals  /64 (BP 1 Location: Left upper arm, BP Patient Position: Sitting)   Pulse 70   Ht 5' 8\" (1.727 m)   Wt 162 lb (73.5 kg)   SpO2 96%   BMI 24.63 kg/m²     General appearance - alert, well appearing, and in no distress  Mental status - affect appropriate to mood  Eyes - sclera anicteric, moist mucous membranes  Neck - supple, no significant adenopathy  Chest - clear to auscultation, no wheezes, rales or rhonchi  Heart - normal rate, regular rhythm, normal S1, S2, no murmurs, rubs, clicks or gallops  Abdomen - soft, nontender, nondistended, no masses or organomegaly  Extremities - peripheral pulses normal, no pedal edema  Skin - normal coloration  no rashes    Medications:     Current Outpatient Medications   Medication Sig    predniSONE (STERAPRED DS) 10 mg dose pack 10 mg daily.  etanercept (EnbreL) 25 mg/0.5mL     1) subcutaneous injection 25 mg by SubCUTAneous route every seven (7) days.  aspirin 81 mg chewable tablet Take 1 Tablet by mouth daily.  lisinopriL (PRINIVIL, ZESTRIL) 10 mg tablet Take 1 Tablet by mouth daily.  metoprolol tartrate (LOPRESSOR) 25 mg tablet Take 1 Tablet by mouth every twelve (12) hours.  prasugreL (EFFIENT) 10 mg tablet Take 1 Tablet by mouth daily.  rosuvastatin (CRESTOR) 20 mg tablet Take 1 Tablet by mouth nightly.  levothyroxine (SYNTHROID) 100 mcg tablet TAKE ONE TABLET BY MOUTH DAILY BEFORE BREAKFAST    promethazine (PHENERGAN) 25 mg tablet TAKE ONE TABLET BY MOUTH ONCE NIGHTLY    cetirizine (ZYRTEC) 10 mg tablet Take 1 Tab by mouth daily. No current facility-administered medications for this visit. Diagnostic Data Review:       No specialty comments available. Lab Review:     Lab Results   Component Value Date/Time    Cholesterol, total 181 08/15/2021 04:06 AM    HDL Cholesterol 53 08/15/2021 04:06 AM    LDL, calculated 105.2 (H) 08/15/2021 04:06 AM    Triglyceride 114 08/15/2021 04:06 AM    CHOL/HDL Ratio 3.4 08/15/2021 04:06 AM     Lab Results   Component Value Date/Time    Creatinine 0.85 08/16/2021 04:22 AM     Lab Results   Component Value Date/Time    BUN 12 08/16/2021 04:22 AM     Lab Results   Component Value Date/Time    Potassium 3.9 08/16/2021 04:22 AM     Lab Results   Component Value Date/Time    Hemoglobin A1c 5.7 (H) 08/25/2017 08:44 AM     Lab Results   Component Value Date/Time    HGB 11.1 (L) 08/15/2021 04:06 AM     Lab Results   Component Value Date/Time    PLATELET 347 12/78/8169 04:06 AM     No results for input(s): CPK, CKMB, TROIQ in the last 72 hours. No lab exists for component: CKQMB, CPKMB             ___________________________________________________    Timur Sick.  Gigi Shields MD, [facility]

## 2021-09-23 ENCOUNTER — HOSPITAL ENCOUNTER (OUTPATIENT)
Dept: LAB | Age: 61
Discharge: HOME | End: 2021-09-23

## 2021-09-23 ENCOUNTER — TELEPHONE (OUTPATIENT)
Dept: CARDIOLOGY CLINIC | Age: 61
End: 2021-09-23

## 2021-09-23 PROCEDURE — 80061 LIPID PANEL: CPT

## 2021-09-23 PROCEDURE — 84153 ASSAY OF PSA TOTAL: CPT

## 2021-09-23 PROCEDURE — 85025 COMPLETE CBC W/AUTO DIFF WBC: CPT

## 2021-09-23 PROCEDURE — 84443 ASSAY THYROID STIM HORMONE: CPT

## 2021-09-23 PROCEDURE — 80053 COMPREHEN METABOLIC PANEL: CPT

## 2021-09-23 PROCEDURE — 83036 HEMOGLOBIN GLYCOSYLATED A1C: CPT

## 2021-09-23 NOTE — TELEPHONE ENCOUNTER
Patient is calling because he needs a letter sent to his dental office for clearance in order to have dental cleaning on 9/27/21. The patient is taking to blood thinners so they are requiring clearance. The dental office is [address] [place] [address].       ISC:[phone]     Phone Patient [phone]

## 2021-09-23 NOTE — TELEPHONE ENCOUNTER
All orders entered per verbal orders of Yolanda Dumont NP Pt had a STEMI s/p PCI RCA (2021); pt should not hold aspirin and/or prasugrel at this time.  Rec DAPT therapy for min of 1 yr.  Pt should discuss options with dental office. Spoke with The pt, identified the pt with name and . I informed the pt of Salma camejo. The pt expressed understanding and agreement. Pt has no further questions or concerns at this time.

## 2021-09-24 LAB
ALBUMIN SERPL-MCNC: 3.8 G/DL (ref 3.5–5)
ALBUMIN/GLOB SERPL: 1.3 {RATIO} (ref 1.1–2.2)
ALP SERPL-CCNC: 52 U/L (ref 45–117)
ALT SERPL-CCNC: 37 U/L (ref 12–78)
ANION GAP SERPL CALC-SCNC: 2 MMOL/L (ref 5–15)
AST SERPL-CCNC: 16 U/L (ref 15–37)
BASOPHILS # BLD: 0.1 K/UL (ref 0–0.1)
BASOPHILS NFR BLD: 1 % (ref 0–1)
BILIRUB SERPL-MCNC: 0.4 MG/DL (ref 0.2–1)
BUN SERPL-MCNC: 11 MG/DL (ref 6–20)
BUN/CREAT SERPL: 13 (ref 12–20)
CALCIUM SERPL-MCNC: 9.3 MG/DL (ref 8.5–10.1)
CHLORIDE SERPL-SCNC: 110 MMOL/L (ref 97–108)
CHOLEST SERPL-MCNC: 166 MG/DL
CO2 SERPL-SCNC: 28 MMOL/L (ref 21–32)
CREAT SERPL-MCNC: 0.86 MG/DL (ref 0.7–1.3)
DIFFERENTIAL METHOD BLD: NORMAL
EOSINOPHIL # BLD: 0.4 K/UL (ref 0–0.4)
EOSINOPHIL NFR BLD: 5 % (ref 0–7)
ERYTHROCYTE [DISTWIDTH] IN BLOOD BY AUTOMATED COUNT: 13.2 % (ref 11.5–14.5)
EST. AVERAGE GLUCOSE BLD GHB EST-MCNC: 108 MG/DL
GLOBULIN SER CALC-MCNC: 3 G/DL (ref 2–4)
GLUCOSE SERPL-MCNC: 103 MG/DL (ref 65–100)
HBA1C MFR BLD: 5.4 % (ref 4–5.6)
HCT VFR BLD AUTO: 41.5 % (ref 36.6–50.3)
HDLC SERPL-MCNC: 72 MG/DL
HDLC SERPL: 2.3 {RATIO} (ref 0–5)
HGB BLD-MCNC: 12.9 G/DL (ref 12.1–17)
IMM GRANULOCYTES # BLD AUTO: 0 K/UL (ref 0–0.04)
IMM GRANULOCYTES NFR BLD AUTO: 0 % (ref 0–0.5)
LDLC SERPL CALC-MCNC: 70.4 MG/DL (ref 0–100)
LYMPHOCYTES # BLD: 2.5 K/UL (ref 0.8–3.5)
LYMPHOCYTES NFR BLD: 32 % (ref 12–49)
MCH RBC QN AUTO: 30.6 PG (ref 26–34)
MCHC RBC AUTO-ENTMCNC: 31.1 G/DL (ref 30–36.5)
MCV RBC AUTO: 98.6 FL (ref 80–99)
MONOCYTES # BLD: 0.7 K/UL (ref 0–1)
MONOCYTES NFR BLD: 9 % (ref 5–13)
NEUTS SEG # BLD: 4.2 K/UL (ref 1.8–8)
NEUTS SEG NFR BLD: 53 % (ref 32–75)
NRBC # BLD: 0 K/UL (ref 0–0.01)
NRBC BLD-RTO: 0 PER 100 WBC
PLATELET # BLD AUTO: 218 K/UL (ref 150–400)
PMV BLD AUTO: 10.2 FL (ref 8.9–12.9)
POTASSIUM SERPL-SCNC: 4.7 MMOL/L (ref 3.5–5.1)
PROT SERPL-MCNC: 6.8 G/DL (ref 6.4–8.2)
PSA SERPL-MCNC: 1 NG/ML (ref 0.01–4)
RBC # BLD AUTO: 4.21 M/UL (ref 4.1–5.7)
SODIUM SERPL-SCNC: 140 MMOL/L (ref 136–145)
TRIGL SERPL-MCNC: 118 MG/DL (ref ?–150)
TSH SERPL DL<=0.05 MIU/L-ACNC: 0.46 UIU/ML (ref 0.36–3.74)
VLDLC SERPL CALC-MCNC: 23.6 MG/DL
WBC # BLD AUTO: 7.9 K/UL (ref 4.1–11.1)

## 2021-11-28 ENCOUNTER — HOSPITAL ENCOUNTER (OUTPATIENT)
Age: 61
Setting detail: OBSERVATION
Discharge: HOME | End: 2021-11-30
Attending: EMERGENCY MEDICINE | Admitting: INTERNAL MEDICINE

## 2021-11-28 ENCOUNTER — APPOINTMENT (OUTPATIENT)
Dept: GENERAL RADIOLOGY | Age: 61
End: 2021-11-28
Attending: EMERGENCY MEDICINE

## 2021-11-28 DIAGNOSIS — R07.9 CHEST PAIN, UNSPECIFIED TYPE: Primary | ICD-10-CM

## 2021-11-28 LAB
ALBUMIN SERPL-MCNC: 3.8 G/DL (ref 3.5–5)
ALBUMIN/GLOB SERPL: 1.2 {RATIO} (ref 1.1–2.2)
ALP SERPL-CCNC: 62 U/L (ref 45–117)
ALT SERPL-CCNC: 27 U/L (ref 12–78)
ANION GAP SERPL CALC-SCNC: 5 MMOL/L (ref 5–15)
AST SERPL-CCNC: 19 U/L (ref 15–37)
BASOPHILS # BLD: 0.1 K/UL (ref 0–0.1)
BASOPHILS NFR BLD: 1 % (ref 0–1)
BILIRUB SERPL-MCNC: 0.6 MG/DL (ref 0.2–1)
BNP SERPL-MCNC: 294 PG/ML
BUN SERPL-MCNC: 11 MG/DL (ref 6–20)
BUN/CREAT SERPL: 14 (ref 12–20)
CALCIUM SERPL-MCNC: 9.2 MG/DL (ref 8.5–10.1)
CHLORIDE SERPL-SCNC: 109 MMOL/L (ref 97–108)
CO2 SERPL-SCNC: 26 MMOL/L (ref 21–32)
COMMENT, HOLDF: NORMAL
CREAT SERPL-MCNC: 0.76 MG/DL (ref 0.7–1.3)
DIFFERENTIAL METHOD BLD: NORMAL
EOSINOPHIL # BLD: 0.4 K/UL (ref 0–0.4)
EOSINOPHIL NFR BLD: 5 % (ref 0–7)
ERYTHROCYTE [DISTWIDTH] IN BLOOD BY AUTOMATED COUNT: 12.2 % (ref 11.5–14.5)
GLOBULIN SER CALC-MCNC: 3.3 G/DL (ref 2–4)
GLUCOSE SERPL-MCNC: 95 MG/DL (ref 65–100)
HCT VFR BLD AUTO: 40.9 % (ref 36.6–50.3)
HGB BLD-MCNC: 14.1 G/DL (ref 12.1–17)
IMM GRANULOCYTES # BLD AUTO: 0 K/UL (ref 0–0.04)
IMM GRANULOCYTES NFR BLD AUTO: 0 % (ref 0–0.5)
LIPASE SERPL-CCNC: 166 U/L (ref 73–393)
LYMPHOCYTES # BLD: 2.6 K/UL (ref 0.8–3.5)
LYMPHOCYTES NFR BLD: 32 % (ref 12–49)
MAGNESIUM SERPL-MCNC: 2 MG/DL (ref 1.6–2.4)
MCH RBC QN AUTO: 29.9 PG (ref 26–34)
MCHC RBC AUTO-ENTMCNC: 34.5 G/DL (ref 30–36.5)
MCV RBC AUTO: 86.7 FL (ref 80–99)
MONOCYTES # BLD: 0.8 K/UL (ref 0–1)
MONOCYTES NFR BLD: 9 % (ref 5–13)
NEUTS SEG # BLD: 4.4 K/UL (ref 1.8–8)
NEUTS SEG NFR BLD: 53 % (ref 32–75)
NRBC # BLD: 0 K/UL (ref 0–0.01)
NRBC BLD-RTO: 0 PER 100 WBC
PLATELET # BLD AUTO: 161 K/UL (ref 150–400)
PMV BLD AUTO: 10.1 FL (ref 8.9–12.9)
POTASSIUM SERPL-SCNC: 4.1 MMOL/L (ref 3.5–5.1)
PROT SERPL-MCNC: 7.1 G/DL (ref 6.4–8.2)
RBC # BLD AUTO: 4.72 M/UL (ref 4.1–5.7)
SAMPLES BEING HELD,HOLD: NORMAL
SODIUM SERPL-SCNC: 140 MMOL/L (ref 136–145)
TROPONIN-HIGH SENSITIVITY: 17 NG/L (ref 0–76)
TROPONIN-HIGH SENSITIVITY: 19 NG/L (ref 0–76)
WBC # BLD AUTO: 8.3 K/UL (ref 4.1–11.1)

## 2021-11-28 PROCEDURE — 99284 EMERGENCY DEPT VISIT MOD MDM: CPT

## 2021-11-28 PROCEDURE — 71046 X-RAY EXAM CHEST 2 VIEWS: CPT

## 2021-11-28 PROCEDURE — G0378 HOSPITAL OBSERVATION PER HR: HCPCS

## 2021-11-28 PROCEDURE — 83735 ASSAY OF MAGNESIUM: CPT

## 2021-11-28 PROCEDURE — 93005 ELECTROCARDIOGRAM TRACING: CPT

## 2021-11-28 PROCEDURE — 80053 COMPREHEN METABOLIC PANEL: CPT

## 2021-11-28 PROCEDURE — 85025 COMPLETE CBC W/AUTO DIFF WBC: CPT

## 2021-11-28 PROCEDURE — 83880 ASSAY OF NATRIURETIC PEPTIDE: CPT

## 2021-11-28 PROCEDURE — 83690 ASSAY OF LIPASE: CPT

## 2021-11-28 PROCEDURE — 84484 ASSAY OF TROPONIN QUANT: CPT

## 2021-11-28 PROCEDURE — 36415 COLL VENOUS BLD VENIPUNCTURE: CPT

## 2021-11-28 RX ORDER — METOPROLOL TARTRATE 25 MG/1
25 TABLET, FILM COATED ORAL EVERY 12 HOURS
Status: DISCONTINUED | OUTPATIENT
Start: 2021-11-28 | End: 2021-11-29

## 2021-11-28 RX ORDER — PROMETHAZINE HYDROCHLORIDE 25 MG/1
25 TABLET ORAL ONCE
Status: COMPLETED | OUTPATIENT
Start: 2021-11-28 | End: 2021-11-29

## 2021-11-28 RX ORDER — ROSUVASTATIN CALCIUM 10 MG/1
20 TABLET, COATED ORAL
Status: DISCONTINUED | OUTPATIENT
Start: 2021-11-28 | End: 2021-11-29

## 2021-11-28 NOTE — ED PROVIDER NOTES
Pt is a 65 yo male with hx of STEMI in august, GERD, thyroid disease presenting with midsternal CP similar to his prior STEMI. Pt reports pain started this morning, constant pain with intermittent worsening, unrelated to exertion. No recent illness, travel, sick contacts. Cath with stent x per Dr Blayne Reyes and has seen Dr William Casiano in clinic since. Past Medical History:   Diagnosis Date    GERD (gastroesophageal reflux disease)     Thyroid disease        Past Surgical History:   Procedure Laterality Date    HX ORTHOPAEDIC      Knee scope x2, CTR x2         Family History:   Problem Relation Age of Onset    Diabetes Brother        Social History     Socioeconomic History    Marital status:      Spouse name: Not on file    Number of children: Not on file    Years of education: Not on file    Highest education level: Not on file   Occupational History    Not on file   Tobacco Use    Smoking status: Former Smoker     Quit date: 3/2/1985     Years since quittin.7    Smokeless tobacco: Never Used   Substance and Sexual Activity    Alcohol use: No     Alcohol/week: 0.0 standard drinks    Drug use: No    Sexual activity: Yes   Other Topics Concern    Not on file   Social History Narrative    Not on file     Social Determinants of Health     Financial Resource Strain:     Difficulty of Paying Living Expenses: Not on file   Food Insecurity:     Worried About Running Out of Food in the Last Year: Not on file    Ekaterina of Food in the Last Year: Not on file   Transportation Needs:     Lack of Transportation (Medical): Not on file    Lack of Transportation (Non-Medical):  Not on file   Physical Activity:     Days of Exercise per Week: Not on file    Minutes of Exercise per Session: Not on file   Stress:     Feeling of Stress : Not on file   Social Connections:     Frequency of Communication with Friends and Family: Not on file    Frequency of Social Gatherings with Friends and Family: Not on file    Attends Quaker Services: Not on file    Active Member of Clubs or Organizations: Not on file    Attends Club or Organization Meetings: Not on file    Marital Status: Not on file   Intimate Partner Violence:     Fear of Current or Ex-Partner: Not on file    Emotionally Abused: Not on file    Physically Abused: Not on file    Sexually Abused: Not on file   Housing Stability:     Unable to Pay for Housing in the Last Year: Not on file    Number of [place] in the Last Year: Not on file    Unstable Housing in the Last Year: Not on file         ALLERGIES: Patient has no known allergies. Review of Systems   Constitutional: Negative for chills and fever. HENT: Negative for drooling and nosebleeds. Eyes: Negative for pain and itching. Respiratory: Negative for choking and stridor. Cardiovascular: Positive for chest pain. Negative for palpitations and leg swelling. Gastrointestinal: Negative for abdominal pain and rectal pain. Endocrine: Negative for heat intolerance and polyphagia. Genitourinary: Negative for enuresis and genital sores. Musculoskeletal: Negative for arthralgias and joint swelling. Skin: Negative for color change. Allergic/Immunologic: Negative for immunocompromised state. Neurological: Negative for tremors and speech difficulty. Hematological: Negative for adenopathy. Psychiatric/Behavioral: Negative for dysphoric mood and sleep disturbance. Vitals:    11/28/21 1414 11/28/21 1527   BP: (!) 157/89 132/79   Pulse: 81 93   Resp: 16 17   Temp: 98 °F (36.7 °C) 97.1 °F (36.2 °C)   SpO2: 99% 97%   Weight: 72.6 kg (160 lb)    Height: 5' 8\" (1.727 m)             Physical Exam  Vitals and nursing note reviewed. Constitutional:       General: He is in acute distress. Appearance: He is well-developed. He is not toxic-appearing or diaphoretic. HENT:      Head: Normocephalic and atraumatic.       Nose: Nose normal.   Eyes: Conjunctiva/sclera: Conjunctivae normal.   Cardiovascular:      Rate and Rhythm: Normal rate and regular rhythm. Heart sounds: Normal heart sounds. Pulmonary:      Effort: Pulmonary effort is normal. No respiratory distress. Breath sounds: Normal breath sounds. Abdominal:      General: There is no distension. Palpations: Abdomen is soft. Tenderness: There is no abdominal tenderness. Musculoskeletal:         General: No deformity. Normal range of motion. Cervical back: Normal range of motion and neck supple. Skin:     General: Skin is warm and dry. Neurological:      Mental Status: He is alert. Coordination: Coordination normal.   Psychiatric:         Behavior: Behavior normal.          [facility]  ED Course as of 11/28/21 2018   Sun Nov 28, 2021   0572 ED EKG interpretation:  Rhythm: normal sinus rhythm; and regular . Rate (approx.): 77; Axis: left axis deviation; ST/T wave: normal; No STEMI [AL]   1927 Spoke with Dr Yusef Mendez. Pt continues to be in pain. Admit, heparin gtt and morphine for pain. [AL]      ED Course User Index  [AL] Mary Kate Ling MD       Procedures    Perfect Serve Consult for Admission  8:18 PM    ED Room Number: Room/bed info not found  Patient Name and age:  Sandrine Marti 64 y.o.  male  Working Diagnosis:   1. Chest pain, unspecified type        COVID-19 Suspicion:  no  Sepsis present:  no  Reassessment needed: no  Code Status:  Full Code  Readmission: no  Isolation Requirements:  no  Recommended Level of Care:  telemetry  Department:[facility] ED - [phone]  Other:  Pt with STEMI in august, now having similar midsternal CP. Discussed with Dr Yusef Mendez, cards on call. Recommends admission and heparin gtt. Patient is being admitted to the hospital.  The results of their tests and reasons for their admission have been discussed with them and/or available family.   They convey agreement and understanding for the need to be admitted and for their admission diagnosis.

## 2021-11-28 NOTE — ED TRIAGE NOTES
Here in August for CP/SOB, had stent placed    States similar pain today     Pain 7/10 midsternal    Takes 81mg of ASA daily

## 2021-11-29 ENCOUNTER — APPOINTMENT (OUTPATIENT)
Dept: NON INVASIVE DIAGNOSTICS | Age: 61
End: 2021-11-29
Attending: SPECIALIST

## 2021-11-29 ENCOUNTER — APPOINTMENT (OUTPATIENT)
Dept: NUCLEAR MEDICINE | Age: 61
End: 2021-11-29
Attending: SPECIALIST

## 2021-11-29 PROBLEM — R07.9 CHEST PAIN: Status: RESOLVED | Noted: 2021-11-28 | Resolved: 2021-11-29

## 2021-11-29 LAB
ALBUMIN SERPL-MCNC: 3.6 G/DL (ref 3.5–5)
ALBUMIN/GLOB SERPL: 1.3 {RATIO} (ref 1.1–2.2)
ALP SERPL-CCNC: 62 U/L (ref 45–117)
ALT SERPL-CCNC: 24 U/L (ref 12–78)
AMPHET UR QL SCN: NEGATIVE
ANION GAP SERPL CALC-SCNC: 8 MMOL/L (ref 5–15)
AST SERPL-CCNC: 16 U/L (ref 15–37)
ATRIAL RATE: 77 BPM
BARBITURATES UR QL SCN: NEGATIVE
BASOPHILS # BLD: 0 K/UL (ref 0–0.1)
BASOPHILS NFR BLD: 1 % (ref 0–1)
BENZODIAZ UR QL: NEGATIVE
BILIRUB SERPL-MCNC: 0.8 MG/DL (ref 0.2–1)
BUN SERPL-MCNC: 14 MG/DL (ref 6–20)
BUN/CREAT SERPL: 21 (ref 12–20)
CALCIUM SERPL-MCNC: 8.8 MG/DL (ref 8.5–10.1)
CALCULATED P AXIS, ECG09: 72 DEGREES
CALCULATED R AXIS, ECG10: -32 DEGREES
CALCULATED T AXIS, ECG11: 3 DEGREES
CANNABINOIDS UR QL SCN: POSITIVE
CHLORIDE SERPL-SCNC: 108 MMOL/L (ref 97–108)
CO2 SERPL-SCNC: 24 MMOL/L (ref 21–32)
COCAINE UR QL SCN: NEGATIVE
CREAT SERPL-MCNC: 0.66 MG/DL (ref 0.7–1.3)
D DIMER PPP FEU-MCNC: 0.5 MG/L FEU (ref 0–0.65)
DIAGNOSIS, 93000: NORMAL
DIFFERENTIAL METHOD BLD: NORMAL
DRUG SCRN COMMENT,DRGCM: ABNORMAL
EOSINOPHIL # BLD: 0.1 K/UL (ref 0–0.4)
EOSINOPHIL NFR BLD: 2 % (ref 0–7)
ERYTHROCYTE [DISTWIDTH] IN BLOOD BY AUTOMATED COUNT: 12.4 % (ref 11.5–14.5)
GLOBULIN SER CALC-MCNC: 2.7 G/DL (ref 2–4)
GLUCOSE SERPL-MCNC: 119 MG/DL (ref 65–100)
HCT VFR BLD AUTO: 37.4 % (ref 36.6–50.3)
HGB BLD-MCNC: 12.8 G/DL (ref 12.1–17)
IMM GRANULOCYTES # BLD AUTO: 0 K/UL (ref 0–0.04)
IMM GRANULOCYTES NFR BLD AUTO: 0 % (ref 0–0.5)
LIPASE SERPL-CCNC: 115 U/L (ref 73–393)
LYMPHOCYTES # BLD: 2.1 K/UL (ref 0.8–3.5)
LYMPHOCYTES NFR BLD: 26 % (ref 12–49)
MAGNESIUM SERPL-MCNC: 2.2 MG/DL (ref 1.6–2.4)
MCH RBC QN AUTO: 29.6 PG (ref 26–34)
MCHC RBC AUTO-ENTMCNC: 34.2 G/DL (ref 30–36.5)
MCV RBC AUTO: 86.6 FL (ref 80–99)
METHADONE UR QL: NEGATIVE
MONOCYTES # BLD: 1 K/UL (ref 0–1)
MONOCYTES NFR BLD: 13 % (ref 5–13)
NEUTS SEG # BLD: 4.6 K/UL (ref 1.8–8)
NEUTS SEG NFR BLD: 58 % (ref 32–75)
NRBC # BLD: 0 K/UL (ref 0–0.01)
NRBC BLD-RTO: 0 PER 100 WBC
OPIATES UR QL: NEGATIVE
P-R INTERVAL, ECG05: 156 MS
PCP UR QL: NEGATIVE
PHOSPHATE SERPL-MCNC: 3.2 MG/DL (ref 2.6–4.7)
PLATELET # BLD AUTO: 163 K/UL (ref 150–400)
PMV BLD AUTO: 10.1 FL (ref 8.9–12.9)
POTASSIUM SERPL-SCNC: 4.4 MMOL/L (ref 3.5–5.1)
PROT SERPL-MCNC: 6.3 G/DL (ref 6.4–8.2)
Q-T INTERVAL, ECG07: 388 MS
QRS DURATION, ECG06: 102 MS
QTC CALCULATION (BEZET), ECG08: 439 MS
RBC # BLD AUTO: 4.32 M/UL (ref 4.1–5.7)
SODIUM SERPL-SCNC: 140 MMOL/L (ref 136–145)
STRESS BASELINE DIAS BP: 76 MMHG
STRESS BASELINE HR: 74 BPM
STRESS BASELINE SYS BP: 112 MMHG
STRESS ESTIMATED WORKLOAD: 1 METS
STRESS EXERCISE DUR MIN: NORMAL
STRESS PEAK DIAS BP: 82 MMHG
STRESS PEAK SYS BP: 132 MMHG
STRESS PERCENT HR ACHIEVED: 69 %
STRESS POST PEAK HR: 109 BPM
STRESS RATE PRESSURE PRODUCT: NORMAL BPM*MMHG
STRESS ST DEPRESSION: 0 MM
STRESS ST ELEVATION: 0 MM
STRESS TARGET HR: 159 BPM
TROPONIN-HIGH SENSITIVITY: 20 NG/L (ref 0–76)
TSH SERPL DL<=0.05 MIU/L-ACNC: 0.07 UIU/ML (ref 0.36–3.74)
VENTRICULAR RATE, ECG03: 77 BPM
WBC # BLD AUTO: 7.8 K/UL (ref 4.1–11.1)

## 2021-11-29 PROCEDURE — 84443 ASSAY THYROID STIM HORMONE: CPT

## 2021-11-29 PROCEDURE — 94760 N-INVAS EAR/PLS OXIMETRY 1: CPT

## 2021-11-29 PROCEDURE — 83735 ASSAY OF MAGNESIUM: CPT

## 2021-11-29 PROCEDURE — 84484 ASSAY OF TROPONIN QUANT: CPT

## 2021-11-29 PROCEDURE — 96375 TX/PRO/DX INJ NEW DRUG ADDON: CPT

## 2021-11-29 PROCEDURE — 78452 HT MUSCLE IMAGE SPECT MULT: CPT

## 2021-11-29 PROCEDURE — 99214 OFFICE O/P EST MOD 30 MIN: CPT | Performed by: SPECIALIST

## 2021-11-29 PROCEDURE — 36415 COLL VENOUS BLD VENIPUNCTURE: CPT

## 2021-11-29 PROCEDURE — 83690 ASSAY OF LIPASE: CPT

## 2021-11-29 PROCEDURE — 74011250636 HC RX REV CODE- 250/636: Performed by: INTERNAL MEDICINE

## 2021-11-29 PROCEDURE — 80053 COMPREHEN METABOLIC PANEL: CPT

## 2021-11-29 PROCEDURE — 74011250636 HC RX REV CODE- 250/636: Performed by: SPECIALIST

## 2021-11-29 PROCEDURE — 78452 HT MUSCLE IMAGE SPECT MULT: CPT | Performed by: STUDENT IN AN ORGANIZED HEALTH CARE EDUCATION/TRAINING PROGRAM

## 2021-11-29 PROCEDURE — 84100 ASSAY OF PHOSPHORUS: CPT

## 2021-11-29 PROCEDURE — G0378 HOSPITAL OBSERVATION PER HR: HCPCS

## 2021-11-29 PROCEDURE — 85025 COMPLETE CBC W/AUTO DIFF WBC: CPT

## 2021-11-29 PROCEDURE — 80307 DRUG TEST PRSMV CHEM ANLYZR: CPT

## 2021-11-29 PROCEDURE — 74011250637 HC RX REV CODE- 250/637: Performed by: INTERNAL MEDICINE

## 2021-11-29 PROCEDURE — 96374 THER/PROPH/DIAG INJ IV PUSH: CPT

## 2021-11-29 PROCEDURE — 85379 FIBRIN DEGRADATION QUANT: CPT

## 2021-11-29 RX ORDER — PROMETHAZINE HYDROCHLORIDE 25 MG/1
25 TABLET ORAL
Status: DISCONTINUED | OUTPATIENT
Start: 2021-11-29 | End: 2021-11-30 | Stop reason: HOSPADM

## 2021-11-29 RX ORDER — POLYETHYLENE GLYCOL 3350 17 G/17G
17 POWDER, FOR SOLUTION ORAL DAILY PRN
Status: DISCONTINUED | OUTPATIENT
Start: 2021-11-29 | End: 2021-11-30 | Stop reason: HOSPADM

## 2021-11-29 RX ORDER — ROSUVASTATIN CALCIUM 10 MG/1
20 TABLET, COATED ORAL
Status: DISCONTINUED | OUTPATIENT
Start: 2021-11-29 | End: 2021-11-30 | Stop reason: HOSPADM

## 2021-11-29 RX ORDER — ACETAMINOPHEN 650 MG/1
650 SUPPOSITORY RECTAL
Status: DISCONTINUED | OUTPATIENT
Start: 2021-11-29 | End: 2021-11-30 | Stop reason: HOSPADM

## 2021-11-29 RX ORDER — ACETAMINOPHEN 325 MG/1
650 TABLET ORAL
Status: DISCONTINUED | OUTPATIENT
Start: 2021-11-29 | End: 2021-11-30 | Stop reason: HOSPADM

## 2021-11-29 RX ORDER — MORPHINE SULFATE 2 MG/ML
2 INJECTION, SOLUTION INTRAMUSCULAR; INTRAVENOUS
Status: DISCONTINUED | OUTPATIENT
Start: 2021-11-29 | End: 2021-11-30 | Stop reason: HOSPADM

## 2021-11-29 RX ORDER — LISINOPRIL 5 MG/1
10 TABLET ORAL DAILY
Status: DISCONTINUED | OUTPATIENT
Start: 2021-11-29 | End: 2021-11-30 | Stop reason: HOSPADM

## 2021-11-29 RX ORDER — ENOXAPARIN SODIUM 100 MG/ML
40 INJECTION SUBCUTANEOUS DAILY
Status: DISCONTINUED | OUTPATIENT
Start: 2021-11-29 | End: 2021-11-30 | Stop reason: HOSPADM

## 2021-11-29 RX ORDER — GUAIFENESIN 100 MG/5ML
81 LIQUID (ML) ORAL DAILY
Status: DISCONTINUED | OUTPATIENT
Start: 2021-11-29 | End: 2021-11-30 | Stop reason: HOSPADM

## 2021-11-29 RX ORDER — METOPROLOL TARTRATE 25 MG/1
25 TABLET, FILM COATED ORAL EVERY 12 HOURS
Status: DISCONTINUED | OUTPATIENT
Start: 2021-11-29 | End: 2021-11-30 | Stop reason: HOSPADM

## 2021-11-29 RX ORDER — SODIUM CHLORIDE 0.9 % (FLUSH) 0.9 %
5-40 SYRINGE (ML) INJECTION EVERY 8 HOURS
Status: DISCONTINUED | OUTPATIENT
Start: 2021-11-29 | End: 2021-11-30 | Stop reason: HOSPADM

## 2021-11-29 RX ORDER — TETRAKIS(2-METHOXYISOBUTYLISOCYANIDE)COPPER(I) TETRAFLUOROBORATE 1 MG/ML
28 INJECTION, POWDER, LYOPHILIZED, FOR SOLUTION INTRAVENOUS
Status: COMPLETED | OUTPATIENT
Start: 2021-11-29 | End: 2021-11-29

## 2021-11-29 RX ORDER — LEFLUNOMIDE 10 MG/1
20 TABLET ORAL EVERY EVENING
Status: DISCONTINUED | OUTPATIENT
Start: 2021-11-29 | End: 2021-11-30 | Stop reason: HOSPADM

## 2021-11-29 RX ORDER — CETIRIZINE HCL 10 MG
10 TABLET ORAL DAILY
Status: DISCONTINUED | OUTPATIENT
Start: 2021-11-29 | End: 2021-11-30 | Stop reason: HOSPADM

## 2021-11-29 RX ORDER — PRASUGREL 10 MG/1
10 TABLET, FILM COATED ORAL DAILY
Status: DISCONTINUED | OUTPATIENT
Start: 2021-11-29 | End: 2021-11-30 | Stop reason: HOSPADM

## 2021-11-29 RX ORDER — FUROSEMIDE 10 MG/ML
40 INJECTION INTRAMUSCULAR; INTRAVENOUS ONCE
Status: COMPLETED | OUTPATIENT
Start: 2021-11-29 | End: 2021-11-29

## 2021-11-29 RX ORDER — TETRAKIS(2-METHOXYISOBUTYLISOCYANIDE)COPPER(I) TETRAFLUOROBORATE 1 MG/ML
9.6 INJECTION, POWDER, LYOPHILIZED, FOR SOLUTION INTRAVENOUS
Status: COMPLETED | OUTPATIENT
Start: 2021-11-29 | End: 2021-11-29

## 2021-11-29 RX ORDER — NITROGLYCERIN 0.4 MG/1
0.4 TABLET SUBLINGUAL AS NEEDED
Status: DISCONTINUED | OUTPATIENT
Start: 2021-11-29 | End: 2021-11-30 | Stop reason: HOSPADM

## 2021-11-29 RX ORDER — LEVOTHYROXINE SODIUM 50 UG/1
100 TABLET ORAL
Status: DISCONTINUED | OUTPATIENT
Start: 2021-11-29 | End: 2021-11-30 | Stop reason: HOSPADM

## 2021-11-29 RX ORDER — ONDANSETRON 4 MG/1
4 TABLET, ORALLY DISINTEGRATING ORAL
Status: DISCONTINUED | OUTPATIENT
Start: 2021-11-29 | End: 2021-11-30 | Stop reason: HOSPADM

## 2021-11-29 RX ORDER — LEFLUNOMIDE 20 MG/1
20 TABLET ORAL EVERY EVENING
COMMUNITY

## 2021-11-29 RX ORDER — ONDANSETRON 2 MG/ML
4 INJECTION INTRAMUSCULAR; INTRAVENOUS
Status: DISCONTINUED | OUTPATIENT
Start: 2021-11-29 | End: 2021-11-30 | Stop reason: HOSPADM

## 2021-11-29 RX ORDER — SODIUM CHLORIDE 0.9 % (FLUSH) 0.9 %
5-40 SYRINGE (ML) INJECTION AS NEEDED
Status: DISCONTINUED | OUTPATIENT
Start: 2021-11-29 | End: 2021-11-30 | Stop reason: HOSPADM

## 2021-11-29 RX ADMIN — TETRAKIS(2-METHOXYISOBUTYLISOCYANIDE)COPPER(I) TETRAFLUOROBORATE 9.6 MILLICURIE: 1 INJECTION, POWDER, LYOPHILIZED, FOR SOLUTION INTRAVENOUS at 11:40

## 2021-11-29 RX ADMIN — Medication 10 ML: at 02:18

## 2021-11-29 RX ADMIN — LEFLUNOMIDE 20 MG: 10 TABLET ORAL at 18:01

## 2021-11-29 RX ADMIN — ONDANSETRON 4 MG: 2 INJECTION INTRAMUSCULAR; INTRAVENOUS at 13:33

## 2021-11-29 RX ADMIN — ASPIRIN 81 MG: 81 TABLET, CHEWABLE ORAL at 09:12

## 2021-11-29 RX ADMIN — METOPROLOL TARTRATE 25 MG: 25 TABLET, FILM COATED ORAL at 00:41

## 2021-11-29 RX ADMIN — ROSUVASTATIN CALCIUM 20 MG: 10 TABLET, COATED ORAL at 21:23

## 2021-11-29 RX ADMIN — PROMETHAZINE HYDROCHLORIDE 25 MG: 25 TABLET ORAL at 21:24

## 2021-11-29 RX ADMIN — LEVOTHYROXINE SODIUM 100 MCG: 0.05 TABLET ORAL at 06:52

## 2021-11-29 RX ADMIN — LISINOPRIL 10 MG: 5 TABLET ORAL at 09:12

## 2021-11-29 RX ADMIN — METOPROLOL TARTRATE 25 MG: 25 TABLET, FILM COATED ORAL at 21:25

## 2021-11-29 RX ADMIN — TETRAKIS(2-METHOXYISOBUTYLISOCYANIDE)COPPER(I) TETRAFLUOROBORATE 28 MILLICURIE: 1 INJECTION, POWDER, LYOPHILIZED, FOR SOLUTION INTRAVENOUS at 13:09

## 2021-11-29 RX ADMIN — PROMETHAZINE HYDROCHLORIDE 25 MG: 25 TABLET ORAL at 00:41

## 2021-11-29 RX ADMIN — PRASUGREL 10 MG: 10 TABLET, FILM COATED ORAL at 09:12

## 2021-11-29 RX ADMIN — FUROSEMIDE 40 MG: 10 INJECTION, SOLUTION INTRAMUSCULAR; INTRAVENOUS at 02:11

## 2021-11-29 RX ADMIN — CETIRIZINE HYDROCHLORIDE 10 MG: 10 TABLET, FILM COATED ORAL at 09:12

## 2021-11-29 RX ADMIN — REGADENOSON 0.4 MG: 0.08 INJECTION, SOLUTION INTRAVENOUS at 13:08

## 2021-11-29 RX ADMIN — ROSUVASTATIN CALCIUM 20 MG: 10 TABLET, COATED ORAL at 00:41

## 2021-11-29 RX ADMIN — METOPROLOL TARTRATE 25 MG: 25 TABLET, FILM COATED ORAL at 09:12

## 2021-11-29 NOTE — DISCHARGE INSTRUCTIONS
Diet as before  Activity as before  Check TSH/FT4 at PCP office next visit  Return to ER or call PCP immediately if symptoms gets worse or re-occur.

## 2021-11-29 NOTE — H&P
Deepak Rose uges [address]  HISTORY AND PHYSICAL    Name:  Henri Medina  MR#:  [de-identified]  :  1960  ACCOUNT #:  [de-identified]  ADMIT DATE:  2021      The patient was seen, evaluated, and admitted by me on 2021. PRIMARY CARE PROVIDER:  Chelsi Justin NP    SOURCE OF INFORMATION:  The patient and review of ED and old electronic medical records. CHIEF COMPLAINT:  Chest pain. HISTORY OF PRESENT ILLNESS:  This is a 70-year-old man with a past medical history significant for coronary artery disease, status post recent stent placement; hypertension, hypothyroidism; dyslipidemia; rheumatoid arthritis, was in his usual state of health until the day of presentation at the emergency room when the patient developed chest pain. The pain is located at the center of the chest with no radiation, 7/10 in severity. No known aggravating or relieving factors, constant, associated with shortness of breath. The shortness of breath is with mild exertion such as walking and also when the patient is lying down. The patient had similar presentation in 2021, was admitted to the hospital from 2021 to 2021. The patient was diagnosed with ST elevation myocardial infarction, underwent emergency cardiac catheterization with stent placement in RCA. When the patient arrived at the emergency room, the first set of troponin was negative. The patient's Cardiology group was consulted. The Cardiology advised admission to the hospital.  The patient was referred to the hospitalist service for that purpose. No associated fever, rigors, or chills. PAST MEDICAL HISTORY:  Dyslipidemia; rheumatoid arthritis; hypertension; hypothyroidism; coronary artery disease, status post stent placement. ALLERGIES:  NO KNOWN DRUG ALLERGIES.     MEDICATIONS:  Aspirin 81 mg daily, Zyrtec 10 mg daily, Enbrel 50 mg subcutaneously every week on Tuesday, Arava 20 mg daily at 05:00 p.m., Synthroid 100 mcg daily, lisinopril 10 mg daily, Lopressor 25 mg twice daily, Effient 10 mg daily, Phenergan 25 mg daily at bedtime, Crestor 20 mg daily at bedtime. FAMILY HISTORY:  This was reviewed. His mother had heart disease. PAST SURGICAL HISTORY:  This is significant for right knee arthroscopy. SOCIAL HISTORY:  The patient is a former smoker. No history of alcohol abuse. REVIEW OF SYSTEMS:  HEAD, EYES, EARS, NOSE AND THROAT:  No headache, no dizziness, no blurring of vision, no photophobia. RESPIRATORY SYSTEM:  This is positive for shortness of breath. No cough. No hemoptysis. CARDIOVASCULAR SYSTEM:  This is positive for chest pain and orthopnea. No palpitation. GASTROINTESTINAL SYSTEM:  No nausea or vomiting. No diarrhea. No constipation. GENITOURINARY SYSTEM:  No dysuria, no urgency, and no frequency. All other systems are reviewed and they are negative. PHYSICAL EXAMINATION:  GENERAL APPEARANCE:  The patient appeared ill, in moderate distress. VITAL SIGNS:  On arrival at the emergency room, temperature 98, pulse 81, respiratory rate 16, blood pressure 157/89, oxygen saturation 99% on room air. HEENT:  Head:  Normocephalic, atraumatic. Eyes:  Normal eye movement. No redness, no drainage, no discharge. Ears:  Normal external ears with no evidence of drainage. Nose:  No deformity and no drainage. Mouth and Throat:  No visible oral lesion. NECK:  Neck is supple. Mild JVD. No thyromegaly. CHEST:  Few bilateral basilar crackles. No wheezing. HEART:  Normal S1 and S2, regular. No clinically appreciable murmur. ABDOMEN:  Soft and nontender. Normal bowel sounds. CNS:  Alert, oriented x3. No gross focal neurological deficit. EXTREMITIES:  No edema. Pulses 2+ bilaterally. MUSCULOSKELETAL SYSTEM:  No evidence of joint deformity and swelling. SKIN:  No active skin lesions seen in the exposed part of the body. PSYCHIATRY:  Normal mood and affect.   LYMPHATIC SYSTEM:  No cervical lymphadenopathy. DIAGNOSTIC DATA:  Chest x-ray:  No acute process. EKG shows sinus rhythm and nonspecific ST and T-waves abnormalities according to the emergency room physician. LABORATORY DATA:  Hematology:  WBC 8.3, hemoglobin at 14.1, hematocrit 40.9, platelets 160. Troponin high-sensitivity 17. Magnesium 20. Pro-BNP level 294. Chemistry:  Sodium 140, potassium 4.1, chloride 109, CO2 of 26, glucose 95, BUN 11, creatinine 0.76, calcium 9.2, total bilirubin 0.6, ALT 27, AST 19, alkaline phosphatase 62, total protein at 7.1, albumin level 3.8, globulin at 3.3. ASSESSMENT:  1. Chest pain. 2.  Suspected acute heart failure with reduced ejection fraction. 3.  Hypothyroidism. 4.  Hypertension. 5.  Dyslipidemia. 6.  Rheumatoid arthritis. PLAN:  1. Chest pain. We will place the patient on observation. We will check serial cardiac markers to rule out acute myocardial infarction. The patient's cardiologist has been consulted. We will await further recommendation from the cardiologist.  The patient will also be evaluated for atypical causes of chest pain. We will check D-dimer to evaluate the patient for thromboembolism. We will also check lipase level. We will carry out pain control. We will check urine drug screen. 2.  Suspected acute heart failure with reduced ejection fraction. In August, the patient's ejection fraction was reported as 45-50%. The patient presented with chest pain associated with shortness of breath, has elevated BNP level as well. We will give an one-time dose of Lasix. We will await further recommendation from the Cardiology to determine whether the patient has congestive heart failure or not. 3.  Hypothyroidism. We will continue with Synthroid. We will check TSH level. 4.  Hypertension. We will resume his preadmission medication and monitor the patient's blood pressure closely. 5.  Dyslipidemia. We will continue with home medication.   6.  Rheumatoid arthritis. We will continue preadmission medication and supportive treatment. OTHER ISSUES:  Code Status: The patient is a full code. We will place the patient on Lovenox for DVT prophylaxis. FUNCTIONAL STATUS PRIOR TO ADMISSION:  The patient came from home. The patient is ambulatory with no assistive device. COVID PRECAUTION:  The patient was wearing a facemask. I was wearing a facemask and gloves for this patient's encounter.         Jennie Whaley MD      [id number]  D:  11/29/2021 5:34  T:  11/29/2021 8:35  JOB #:  [id number]  CC:  Cecile Galdamez NP

## 2021-11-29 NOTE — PROGRESS NOTES
Hospitalist Progress Note    NAME: Rebecca Matt   :  1960   MRN:  [id number]     Subjective:   Daily Progress Note: 2021 11:50 AM      Chief complaint: Admitted with chest pain  Patient seen and examined in ER. Case discussed with wife at bedside. No current chest pain. Ongoing stress test noted. Current Facility-Administered Medications   Medication Dose Route Frequency    aspirin chewable tablet 81 mg  81 mg Oral DAILY    cetirizine (ZYRTEC) tablet 10 mg  10 mg Oral DAILY    leflunomide (ARAVA) tablet 20 mg  20 mg Oral QPM    levothyroxine (SYNTHROID) tablet 100 mcg  100 mcg Oral ACB    lisinopriL (PRINIVIL, ZESTRIL) tablet 10 mg  10 mg Oral DAILY    metoprolol tartrate (LOPRESSOR) tablet 25 mg  25 mg Oral Q12H    prasugreL (EFFIENT) tablet 10 mg  10 mg Oral DAILY    promethazine (PHENERGAN) tablet 25 mg  25 mg Oral QHS    rosuvastatin (CRESTOR) tablet 20 mg  20 mg Oral QHS    sodium chloride (NS) flush 5-40 mL  5-40 mL IntraVENous Q8H    sodium chloride (NS) flush 5-40 mL  5-40 mL IntraVENous PRN    acetaminophen (TYLENOL) tablet 650 mg  650 mg Oral Q6H PRN    Or    acetaminophen (TYLENOL) suppository 650 mg  650 mg Rectal Q6H PRN    polyethylene glycol (MIRALAX) packet 17 g  17 g Oral DAILY PRN    ondansetron (ZOFRAN ODT) tablet 4 mg  4 mg Oral Q8H PRN    Or    ondansetron (ZOFRAN) injection 4 mg  4 mg IntraVENous Q6H PRN    enoxaparin (LOVENOX) injection 40 mg  40 mg SubCUTAneous DAILY    nitroglycerin (NITROSTAT) tablet 0.4 mg  0.4 mg SubLINGual PRN    morphine injection 2 mg  2 mg IntraVENous Q4H PRN     Current Outpatient Medications   Medication Sig    leflunomide (ARAVA) 20 mg tablet Take 20 mg by mouth every evening. 5 pm    etanercept (EnbreL) 25 mg/0.5mL     1) subcutaneous injection 50 mg by SubCUTAneous route every seven (7) days. Q Tuesday    aspirin 81 mg chewable tablet Take 1 Tablet by mouth daily.     lisinopriL (PRINIVIL, ZESTRIL) 10 mg tablet Take 1 Tablet by mouth daily.  metoprolol tartrate (LOPRESSOR) 25 mg tablet Take 1 Tablet by mouth every twelve (12) hours.  prasugreL (EFFIENT) 10 mg tablet Take 1 Tablet by mouth daily.  rosuvastatin (CRESTOR) 20 mg tablet Take 1 Tablet by mouth nightly.  levothyroxine (SYNTHROID) 100 mcg tablet TAKE ONE TABLET BY MOUTH DAILY BEFORE BREAKFAST    promethazine (PHENERGAN) 25 mg tablet TAKE ONE TABLET BY MOUTH ONCE NIGHTLY    cetirizine (ZYRTEC) 10 mg tablet Take 1 Tab by mouth daily.  predniSONE (STERAPRED DS) 10 mg dose pack 10 mg daily. (Patient not taking: Reported on 2021)          Objective:     Visit Vitals  /70   Pulse 69   Temp 98.7 °F (37.1 °C)   Resp 11   Ht 5' 8\" (1.727 m)   Wt 72.6 kg (160 lb)   SpO2 97%   BMI 24.33 kg/m²      O2 Device: None (Room air)    Temp (24hrs), Av.9 °F (36.6 °C), Min:97.1 °F (36.2 °C), Max:98.7 °F (37.1 °C)        PHYSICAL EXAM:  General WDWN  Neck supple  CVS regular rhythm  Respiratory symmetric expansion  Abdomen soft, ND  Extremities moves all  Neuro AAOx3  Psych normal affect  Skin no visible rash      Data Review    Recent Results (from the past 24 hour(s))   METABOLIC PANEL, COMPREHENSIVE    Collection Time: 21  2:25 PM   Result Value Ref Range    Sodium 140 136 - 145 mmol/L    Potassium 4.1 3.5 - 5.1 mmol/L    Chloride 109 (H) 97 - 108 mmol/L    CO2 26 21 - 32 mmol/L    Anion gap 5 5 - 15 mmol/L    Glucose 95 65 - 100 mg/dL    BUN 11 6 - 20 MG/DL    Creatinine 0.76 0.70 - 1.30 MG/DL    BUN/Creatinine ratio 14 12 - 20      GFR est AA >60 >60 ml/min/1.73m2    GFR est non-AA >60 >60 ml/min/1.73m2    Calcium 9.2 8.5 - 10.1 MG/DL    Bilirubin, total 0.6 0.2 - 1.0 MG/DL    ALT (SGPT) 27 12 - 78 U/L    AST (SGOT) 19 15 - 37 U/L    Alk.  phosphatase 62 45 - 117 U/L    Protein, total 7.1 6.4 - 8.2 g/dL    Albumin 3.8 3.5 - 5.0 g/dL    Globulin 3.3 2.0 - 4.0 g/dL    A-G Ratio 1.2 1.1 - 2.2     NT-PRO BNP    Collection Time: 21 2:25 PM   Result Value Ref Range    NT pro- (H) <125 PG/ML   TROPONIN-HIGH SENSITIVITY    Collection Time: 11/28/21  2:25 PM   Result Value Ref Range    Troponin-High Sensitivity 17 0 - 76 ng/L   LIPASE    Collection Time: 11/28/21  2:25 PM   Result Value Ref Range    Lipase 166 73 - 393 U/L   CBC WITH AUTOMATED DIFF    Collection Time: 11/28/21  2:25 PM   Result Value Ref Range    WBC 8.3 4.1 - 11.1 K/uL    RBC 4.72 4.10 - 5.70 M/uL    HGB 14.1 12.1 - 17.0 g/dL    HCT 40.9 36.6 - 50.3 %    MCV 86.7 80.0 - 99.0 FL    MCH 29.9 26.0 - 34.0 PG    MCHC 34.5 30.0 - 36.5 g/dL    RDW 12.2 11.5 - 14.5 %    PLATELET 301 518 - 771 K/uL    MPV 10.1 8.9 - 12.9 FL    NRBC 0.0 0  WBC    ABSOLUTE NRBC 0.00 0.00 - 0.01 K/uL    NEUTROPHILS 53 32 - 75 %    LYMPHOCYTES 32 12 - 49 %    MONOCYTES 9 5 - 13 %    EOSINOPHILS 5 0 - 7 %    BASOPHILS 1 0 - 1 %    IMMATURE GRANULOCYTES 0 0.0 - 0.5 %    ABS. NEUTROPHILS 4.4 1.8 - 8.0 K/UL    ABS. LYMPHOCYTES 2.6 0.8 - 3.5 K/UL    ABS. MONOCYTES 0.8 0.0 - 1.0 K/UL    ABS. EOSINOPHILS 0.4 0.0 - 0.4 K/UL    ABS. BASOPHILS 0.1 0.0 - 0.1 K/UL    ABS. IMM. GRANS. 0.0 0.00 - 0.04 K/UL    DF AUTOMATED     MAGNESIUM    Collection Time: 11/28/21  2:25 PM   Result Value Ref Range    Magnesium 2.0 1.6 - 2.4 mg/dL   SAMPLES BEING HELD    Collection Time: 11/28/21  2:25 PM   Result Value Ref Range    SAMPLES BEING HELD 1SST,1BL,1RED     COMMENT        Add-on orders for these samples will be processed based on acceptable specimen integrity and analyte stability, which may vary by analyte.    TROPONIN-HIGH SENSITIVITY    Collection Time: 11/28/21  5:31 PM   Result Value Ref Range    Troponin-High Sensitivity 19 0 - 76 ng/L   METABOLIC PANEL, COMPREHENSIVE    Collection Time: 11/29/21  1:57 AM   Result Value Ref Range    Sodium 140 136 - 145 mmol/L    Potassium 4.4 3.5 - 5.1 mmol/L    Chloride 108 97 - 108 mmol/L    CO2 24 21 - 32 mmol/L    Anion gap 8 5 - 15 mmol/L    Glucose 119 (H) 65 - 100 mg/dL    BUN 14 6 - 20 MG/DL    Creatinine 0.66 (L) 0.70 - 1.30 MG/DL    BUN/Creatinine ratio 21 (H) 12 - 20      GFR est AA >60 >60 ml/min/1.73m2    GFR est non-AA >60 >60 ml/min/1.73m2    Calcium 8.8 8.5 - 10.1 MG/DL    Bilirubin, total 0.8 0.2 - 1.0 MG/DL    ALT (SGPT) 24 12 - 78 U/L    AST (SGOT) 16 15 - 37 U/L    Alk. phosphatase 62 45 - 117 U/L    Protein, total 6.3 (L) 6.4 - 8.2 g/dL    Albumin 3.6 3.5 - 5.0 g/dL    Globulin 2.7 2.0 - 4.0 g/dL    A-G Ratio 1.3 1.1 - 2.2     CBC WITH AUTOMATED DIFF    Collection Time: 11/29/21  1:57 AM   Result Value Ref Range    WBC 7.8 4.1 - 11.1 K/uL    RBC 4.32 4.10 - 5.70 M/uL    HGB 12.8 12.1 - 17.0 g/dL    HCT 37.4 36.6 - 50.3 %    MCV 86.6 80.0 - 99.0 FL    MCH 29.6 26.0 - 34.0 PG    MCHC 34.2 30.0 - 36.5 g/dL    RDW 12.4 11.5 - 14.5 %    PLATELET 948 770 - 011 K/uL    MPV 10.1 8.9 - 12.9 FL    NRBC 0.0 0  WBC    ABSOLUTE NRBC 0.00 0.00 - 0.01 K/uL    NEUTROPHILS 58 32 - 75 %    LYMPHOCYTES 26 12 - 49 %    MONOCYTES 13 5 - 13 %    EOSINOPHILS 2 0 - 7 %    BASOPHILS 1 0 - 1 %    IMMATURE GRANULOCYTES 0 0.0 - 0.5 %    ABS. NEUTROPHILS 4.6 1.8 - 8.0 K/UL    ABS. LYMPHOCYTES 2.1 0.8 - 3.5 K/UL    ABS. MONOCYTES 1.0 0.0 - 1.0 K/UL    ABS. EOSINOPHILS 0.1 0.0 - 0.4 K/UL    ABS. BASOPHILS 0.0 0.0 - 0.1 K/UL    ABS. IMM.  GRANS. 0.0 0.00 - 0.04 K/UL    DF AUTOMATED     TSH 3RD GENERATION    Collection Time: 11/29/21  1:57 AM   Result Value Ref Range    TSH 0.07 (L) 0.36 - 3.74 uIU/mL   MAGNESIUM    Collection Time: 11/29/21  1:57 AM   Result Value Ref Range    Magnesium 2.2 1.6 - 2.4 mg/dL   PHOSPHORUS    Collection Time: 11/29/21  1:57 AM   Result Value Ref Range    Phosphorus 3.2 2.6 - 4.7 MG/DL   D DIMER    Collection Time: 11/29/21  1:57 AM   Result Value Ref Range    D-dimer 0.50 0.00 - 0.65 mg/L FEU   LIPASE    Collection Time: 11/29/21  1:57 AM   Result Value Ref Range    Lipase 115 73 - 393 U/L   TROPONIN-HIGH SENSITIVITY    Collection Time: 11/29/21  1:57 AM Result Value Ref Range    Troponin-High Sensitivity 20 0 - 76 ng/L   DRUG SCREEN, URINE    Collection Time: 11/29/21  3:08 AM   Result Value Ref Range    AMPHETAMINES Negative NEG      BARBITURATES Negative NEG      BENZODIAZEPINES Negative NEG      COCAINE Negative NEG      METHADONE Negative NEG      OPIATES Negative NEG      PCP(PHENCYCLIDINE) Negative NEG      THC (TH-CANNABINOL) Positive (A) NEG      Drug screen comment (NOTE)    NUCLEAR CARDIAC STRESS TEST    Collection Time: 11/29/21 11:29 AM   Result Value Ref Range    Target  bpm     No results found for this visit on 11/28/21. All Micro Results     None            Assessment/Plan:     Problem list  1. Chest pain. 2.  Suspected acute heart failure with reduced ejection fraction. 3.  Hypothyroidism. 4.  Hypertension. 5.  Dyslipidemia. 6.  Rheumatoid arthritis.       1. Chest pain continue medical treatment, follow stress test result. Follow cardiology recommendations. 2.  Chronic HF with EF 45-50%  S/p  diuresis in ER, continue to follow cardiology recommendations. CXR clear    3. Hypothyroidism. Cont Synthroid. Low TSH, needs to repeat TSH with T4 at PCPs office  4. Hypertension. Monitor blood pressure closely. 5.  Dyslipidemia. On statins  6. Rheumatoid arthritis. Follow-up PCP as before.     AD FC  DVT PRx Lovenox  NOK spouse    Dispo pending stress test    Signed By: Eloy Feliciano MD     November 29, 2021

## 2021-11-29 NOTE — CONSULTS
CARDIOLOGY CONSULTATION NOTE    Jame Stevenson MD,  [address], [place], [address] [facility]  Phone [phone]; Fax [phone]  Mobile [phone]   Voice Mail [phone]                               2021  9:56 PM  Primary Care Lan Schreiber NP  :  1960   MRN:  [id number]     CC: Chest pain and shortness of breath    Reason for consult:  Same      Admission Diagnosis: Chest pain [R07.9]         ATTENTION:   This medical record was transcribed using an electronic medical records/speech recognition system. Although proofread, it may and can contain electronic, spelling and other errors. Corrections may be executed at a later time. Please feel free to contact us for any clarifications as needed. Impression Plan/Recommendation   1. CAD stenting in 2021/PCI RCA 2021 on Effient  2. Dyslipidemia on Crestor  3. Hypertension on metoprolol lisinopril  4. Rheumatoid arthritis            H&H 12.8/37, potassium 4.4, creatinine 1.66  High-sensitivity troponin 20, TSH 2.07, proBNP 294, chest x-ray no acute process  ECG: Normal sinus rhythm left axis deviation old inferior wall MI  1. This discomfort is similar to what he is had in the past but with the duration of his pain and the fact that it is associated with deep breathing makes it less likely to be cardiac. He does not have any evidence of pericarditis. I think we should go forward with a Elyce Bias he is in agreement to this plan. His troponins are negative's. 2.  His TSH is extremely low so I think adjusting his Synthroid or rechecking this test TSH is appropriate. Lugenia Boxer is a 64 y.o. male I am seeing for chest discomfort. He has negative troponins thus far. He has a history of dyslipidemia, hypertension and rheumatoid arthritis and underwent cardiac catheterization in 2021 with a PTCA of the RCA.   He has a residual mild to moderate disease of the LAD. EF at that time was 40 to 45% with inferior wall motion abnormalities. He does have chest discomfort with deep breathing. Is not made worse by walking to the garage or doing some of the other activities he typically does but he will get winded is not unusual for him to do so. He responded to his wife's input when she said that he was short of breath. He felt that it was not any more than usual.       Cardiac risk factors: remote smoking/ tobacco exposure, dyslipidemia, male gender, hypertension. No Known Allergies      Past Medical History:   Diagnosis Date    GERD (gastroesophageal reflux disease)     Thyroid disease         Past Surgical History:   Procedure Laterality Date    HX ORTHOPAEDIC      Knee scope x2, CTR x2        . Home Medications:  Prior to Admission Medications   Prescriptions Last Dose Informant Patient Reported? Taking?   aspirin 81 mg chewable tablet 2021 at Unknown time  No Yes   Sig: Take 1 Tablet by mouth daily. cetirizine (ZYRTEC) 10 mg tablet 2021 at Unknown time  No Yes   Sig: Take 1 Tab by mouth daily. etanercept (EnbreL) 25 mg/0.5mL     1) subcutaneous injection 2021 at Unknown time  Yes Yes   Si mg by SubCUTAneous route every seven (7) days. Q Tuesday   leflunomide (ARAVA) 20 mg tablet 2021 at Unknown time  Yes Yes   Sig: Take 20 mg by mouth every evening. 5 pm   levothyroxine (SYNTHROID) 100 mcg tablet 2021 at Unknown time  No Yes   Sig: TAKE ONE TABLET BY MOUTH DAILY BEFORE BREAKFAST   lisinopriL (PRINIVIL, ZESTRIL) 10 mg tablet 2021 at Unknown time  No Yes   Sig: Take 1 Tablet by mouth daily. metoprolol tartrate (LOPRESSOR) 25 mg tablet 2021 at Unknown time  No Yes   Sig: Take 1 Tablet by mouth every twelve (12) hours. prasugreL (EFFIENT) 10 mg tablet 2021 at Unknown time  No Yes   Sig: Take 1 Tablet by mouth daily.    predniSONE (STERAPRED DS) 10 mg dose pack Not Taking at Unknown time  Yes No   Sig: 10 mg daily. Patient not taking: Reported on 11/29/2021   promethazine (PHENERGAN) 25 mg tablet 11/28/2021 at Unknown time  No Yes   Sig: TAKE ONE TABLET BY MOUTH ONCE NIGHTLY   rosuvastatin (CRESTOR) 20 mg tablet 11/28/2021 at Unknown time  No Yes   Sig: Take 1 Tablet by mouth nightly. Facility-Administered Medications: None       Hospital Medications:  Current Facility-Administered Medications   Medication Dose Route Frequency    aspirin chewable tablet 81 mg  81 mg Oral DAILY    cetirizine (ZYRTEC) tablet 10 mg  10 mg Oral DAILY    leflunomide (ARAVA) tablet 20 mg  20 mg Oral QPM    levothyroxine (SYNTHROID) tablet 100 mcg  100 mcg Oral ACB    lisinopriL (PRINIVIL, ZESTRIL) tablet 10 mg  10 mg Oral DAILY    metoprolol tartrate (LOPRESSOR) tablet 25 mg  25 mg Oral Q12H    prasugreL (EFFIENT) tablet 10 mg  10 mg Oral DAILY    promethazine (PHENERGAN) tablet 25 mg  25 mg Oral QHS    rosuvastatin (CRESTOR) tablet 20 mg  20 mg Oral QHS    sodium chloride (NS) flush 5-40 mL  5-40 mL IntraVENous Q8H    sodium chloride (NS) flush 5-40 mL  5-40 mL IntraVENous PRN    acetaminophen (TYLENOL) tablet 650 mg  650 mg Oral Q6H PRN    Or    acetaminophen (TYLENOL) suppository 650 mg  650 mg Rectal Q6H PRN    polyethylene glycol (MIRALAX) packet 17 g  17 g Oral DAILY PRN    ondansetron (ZOFRAN ODT) tablet 4 mg  4 mg Oral Q8H PRN    Or    ondansetron (ZOFRAN) injection 4 mg  4 mg IntraVENous Q6H PRN    enoxaparin (LOVENOX) injection 40 mg  40 mg SubCUTAneous DAILY    nitroglycerin (NITROSTAT) tablet 0.4 mg  0.4 mg SubLINGual PRN    morphine injection 2 mg  2 mg IntraVENous Q4H PRN     Current Outpatient Medications   Medication Sig    leflunomide (ARAVA) 20 mg tablet Take 20 mg by mouth every evening. 5 pm    etanercept (EnbreL) 25 mg/0.5mL     1) subcutaneous injection 50 mg by SubCUTAneous route every seven (7) days. Q Tuesday    aspirin 81 mg chewable tablet Take 1 Tablet by mouth daily.     lisinopriL (PRINIVIL, ZESTRIL) 10 mg tablet Take 1 Tablet by mouth daily.  metoprolol tartrate (LOPRESSOR) 25 mg tablet Take 1 Tablet by mouth every twelve (12) hours.  prasugreL (EFFIENT) 10 mg tablet Take 1 Tablet by mouth daily.  rosuvastatin (CRESTOR) 20 mg tablet Take 1 Tablet by mouth nightly.  levothyroxine (SYNTHROID) 100 mcg tablet TAKE ONE TABLET BY MOUTH DAILY BEFORE BREAKFAST    promethazine (PHENERGAN) 25 mg tablet TAKE ONE TABLET BY MOUTH ONCE NIGHTLY    cetirizine (ZYRTEC) 10 mg tablet Take 1 Tab by mouth daily.  predniSONE (STERAPRED DS) 10 mg dose pack 10 mg daily. (Patient not taking: Reported on 2021)          OBJECTIVE       Laboratory and Imaging have been reviewed and are notable for          Diagnostic Tests:     No results for input(s): CPK, CKMB, TROIQ in the last 72 hours.     No lab exists for component: CKQMB, CPKMB  Recent Labs     21  0157 21  1425    140   K 4.4 4.1   CO2 24 26   BUN 14 11   CREA 0.66* 0.76   * 95   PHOS 3.2  --    MG 2.2 2.0   WBC 7.8 8.3   HGB 12.8 14.1   HCT 37.4 40.9    161         Cardiac work up to date:  1) echocardiogram  (2021): EF 45 to 50% mild MR    2) cardiac catheterization  (2021): Acute inferior MI status post primary PCI of proximal RCA with a 3.25 x 15 mm PALMER postdilated to 3.75  There was distal embolization to the PDA and mid posterior lateral.,  Diffuse  mid LAD disease the EDP was 21 mmHg    3) cholesterol  (2021): , HDL 72, LDL 70,                          Social History:  Social History     Tobacco Use    Smoking status: Former Smoker     Quit date: 3/2/1985     Years since quittin.7    Smokeless tobacco: Never Used   Substance Use Topics    Alcohol use: No     Alcohol/week: 0.0 standard drinks       Family History:  Family History   Problem Relation Age of Onset    Diabetes Brother        Review of Symptoms:  A comprehensive review of systems was negative except for that written in the HPI. Physical Exam:      Visit Vitals  /72   Pulse 78   Temp 98.7 °F (37.1 °C)   Resp 13   Ht 5' 8\" (1.727 m)   Wt 160 lb (72.6 kg)   SpO2 97%   BMI 24.33 kg/m²     General Appearance:  Well developed, some historian well nourished,alert and oriented x 3, and individual in no acute distress. Ears/Nose/Mouth/Throat:   Hearing grossly normal.Normal oral mucosa,no scleral icterus     Neck: Supple no JVD or bruits,no cervical lymphadenopathy   Chest:   Lungs clear to auscultation bilaterally,no rales rhonchi or wheezing   Cardiovascular:  Regular rate and rhythm, S1, S2 normal,  Murmur. PMI nondisplaced   Abdomen:   Soft, non-tender, bowel sounds are active. No abdominal bruits   Extremities: No edema bilaterally. Pulses detected, no varicosities   Skin: Warm and dry. No bruising  Neuro  Moves all extermities and neurologically intact                                                       I have discussed the diagnosis with the patient and the intended plan as seen in the above orders. Questions were answered concerning future plans. I have discussed medication side effects and warnings with the patient as well. Ho Barnett is in agreement to the plan listed above and wishes to proceed. he  was instructed not to smoke, eat heart healthy diet  and to exercise. Thank you for the consult and the opportunity to be involved in the care of Ho Barnett.         Uche Rivas MD

## 2021-11-30 VITALS
HEIGHT: 68 IN | RESPIRATION RATE: 11 BRPM | OXYGEN SATURATION: 97 % | DIASTOLIC BLOOD PRESSURE: 71 MMHG | HEART RATE: 72 BPM | TEMPERATURE: 97.6 F | SYSTOLIC BLOOD PRESSURE: 106 MMHG | BODY MASS INDEX: 24.25 KG/M2 | WEIGHT: 160 LBS

## 2021-11-30 PROCEDURE — 96372 THER/PROPH/DIAG INJ SC/IM: CPT

## 2021-11-30 PROCEDURE — 74011250637 HC RX REV CODE- 250/637: Performed by: INTERNAL MEDICINE

## 2021-11-30 PROCEDURE — G0378 HOSPITAL OBSERVATION PER HR: HCPCS

## 2021-11-30 PROCEDURE — 99212 OFFICE O/P EST SF 10 MIN: CPT | Performed by: SPECIALIST

## 2021-11-30 PROCEDURE — 74011250637 HC RX REV CODE- 250/637: Performed by: NURSE PRACTITIONER

## 2021-11-30 PROCEDURE — 74011250636 HC RX REV CODE- 250/636: Performed by: INTERNAL MEDICINE

## 2021-11-30 PROCEDURE — APPSS30 APP SPLIT SHARED TIME 16-30 MINUTES: Performed by: NURSE PRACTITIONER

## 2021-11-30 RX ORDER — ISOSORBIDE MONONITRATE 30 MG/1
30 TABLET, EXTENDED RELEASE ORAL DAILY
Qty: 30 TABLET | Refills: 1 | Status: SHIPPED | OUTPATIENT
Start: 2021-11-30 | End: 2022-01-24

## 2021-11-30 RX ORDER — ISOSORBIDE MONONITRATE 30 MG/1
30 TABLET, EXTENDED RELEASE ORAL DAILY
Status: DISCONTINUED | OUTPATIENT
Start: 2021-11-30 | End: 2021-11-30 | Stop reason: HOSPADM

## 2021-11-30 RX ADMIN — LEVOTHYROXINE SODIUM 100 MCG: 0.05 TABLET ORAL at 09:17

## 2021-11-30 RX ADMIN — ASPIRIN 81 MG: 81 TABLET, CHEWABLE ORAL at 09:18

## 2021-11-30 RX ADMIN — ENOXAPARIN SODIUM 40 MG: 40 INJECTION SUBCUTANEOUS at 09:18

## 2021-11-30 RX ADMIN — ISOSORBIDE MONONITRATE 30 MG: 30 TABLET, EXTENDED RELEASE ORAL at 09:18

## 2021-11-30 RX ADMIN — METOPROLOL TARTRATE 25 MG: 25 TABLET, FILM COATED ORAL at 09:17

## 2021-11-30 RX ADMIN — LISINOPRIL 10 MG: 5 TABLET ORAL at 09:17

## 2021-11-30 RX ADMIN — PRASUGREL 10 MG: 10 TABLET, FILM COATED ORAL at 09:48

## 2021-11-30 NOTE — PROGRESS NOTES
Bedside shift change report given to [facility] (oncoming nurse) by Irvin Mccartney (offgoing nurse). Report included the following information SBAR, Kardex, ED Summary, Intake/Output, MAR, Recent Results and Cardiac Rhythm NSRT.

## 2021-11-30 NOTE — DISCHARGE SUMMARY
Hospitalist Discharge Summary     Patient ID:  Melody Norwood  [id number]  85 y.o.  1960 11/28/2021    PCP on record: Shoshana Root NP    Admit date: 11/28/2021  Discharge date and time: 11/30/2021    DISCHARGE DIAGNOSIS:  1.  Chest pain. 2.  Chr DHF ef 45%  3.  Hypothyroidism. 4.  Hypertension. 5.  Dyslipidemia. 6.  Rheumatoid arthritis. CONSULTATIONS:  IP CONSULT TO CARDIOLOGY    Excerpted HPI from H&P of James Medina MD:  CHIEF COMPLAINT:  Chest pain.     HISTORY OF PRESENT ILLNESS:  This is a 77-year-old man with a past medical history significant for coronary artery disease, status post recent stent placement; hypertension, hypothyroidism; dyslipidemia; rheumatoid arthritis, was in his usual state of health until the day of presentation at the emergency room when the patient developed chest pain. The pain is located at the center of the chest with no radiation, 7/10 in severity. No known aggravating or relieving factors, constant, associated with shortness of breath. The shortness of breath is with mild exertion such as walking and also when the patient is lying down. The patient had similar presentation in 08/2021, was admitted to the hospital from 08/14/2021 to 08/17/2021. The patient was diagnosed with ST elevation myocardial infarction, underwent emergency cardiac catheterization with stent placement in RCA. When the patient arrived at the emergency room, the first set of troponin was negative. The patient's Cardiology group was consulted. The Cardiology advised admission to the hospital.  The patient was referred to the hospitalist service for that purpose. No associated fever, rigors, or chills. ______________________________________________________________________  DISCHARGE SUMMARY/HOSPITAL COURSE:  for full details see H&P, daily progress notes, labs, consult notes.      1.  Chest pain  stress test abnormal but per cardiology continue medical treatment added Imdur 30 mg daily. Follow cardiology as OP may need cardiac cath if persistent chest pain per cardiology note can be done as outpatient.     2. Chronic HF with EF 45-50%  S/p  diuresis in ER, continue to follow cardiology as OP. CXR clear     3.  Hypothyroidism.    Cont Synthroid.    Low TSH, needs to repeat TSH with T4 at PCPs office  4.  Hypertension. Follow-up PCP as before  5.  Dyslipidemia.    On statins  6.  Rheumatoid arthritis.    Follow-up PCP as before. Stress test resultsInterpretation Summary    · Baseline ECG: Normal sinus rhythm, non-specific ST-T wave abnormalities. · SPECT: Left ventricular function post-stress was abnormal. Calculated ejection fraction is 45% (normal EF value is equal to or greater than 50%). · Stress test: Exercise stress test: No significant ecg changes with lexiscan. .  · SPECT: Left ventricular perfusion is abnormal. Myocardial perfusion imaging defect 1: There is a defect that is large in size with a severe reduction in uptake of the inferior wall(s) that is partially reversible. The defect appears to be infarction with mariam-infarct ischemia. · SPECT: Left ventricular perfusion is abnormal. Myocardial perfusion imaging supports a high risk stress test.       _______________________________________________________________________  Patient seen and examined by me on discharge day. Patient has no further chest pain and stress test  Results as above per cardiology okay to go home on Imdur and if persistent chest pain may need cardiac cath in future. Other acute issues medically stable for discharge. Patient agreed and verbalized understanding of discharge plan and instructions. ___________________________________________________________  DISCHARGE MEDICATIONS:   Current Discharge Medication List      START taking these medications    Details   isosorbide mononitrate ER (IMDUR) 30 mg tablet Take 1 Tablet by mouth daily.   Qty: 30 Tablet, Refills: 1  Start date: 11/30/2021         CONTINUE these medications which have NOT CHANGED    Details   leflunomide (ARAVA) 20 mg tablet Take 20 mg by mouth every evening. 5 pm      etanercept (EnbreL) 25 mg/0.5mL     1) subcutaneous injection 50 mg by SubCUTAneous route every seven (7) days. Q Tuesday      aspirin 81 mg chewable tablet Take 1 Tablet by mouth daily. Qty: 90 Tablet, Refills: 3      lisinopriL (PRINIVIL, ZESTRIL) 10 mg tablet Take 1 Tablet by mouth daily. Qty: 90 Tablet, Refills: 1      metoprolol tartrate (LOPRESSOR) 25 mg tablet Take 1 Tablet by mouth every twelve (12) hours. Qty: 60 Tablet, Refills: 2      prasugreL (EFFIENT) 10 mg tablet Take 1 Tablet by mouth daily. Qty: 90 Tablet, Refills: 3      rosuvastatin (CRESTOR) 20 mg tablet Take 1 Tablet by mouth nightly. Qty: 90 Tablet, Refills: 3      levothyroxine (SYNTHROID) 100 mcg tablet TAKE ONE TABLET BY MOUTH DAILY BEFORE BREAKFAST  Qty: 90 Tab, Refills: 0    Associated Diagnoses: Acquired hypothyroidism      promethazine (PHENERGAN) 25 mg tablet TAKE ONE TABLET BY MOUTH ONCE NIGHTLY  Qty: 30 Tab, Refills: 2      cetirizine (ZYRTEC) 10 mg tablet Take 1 Tab by mouth daily. Qty: 90 Tab, Refills: 1         STOP taking these medications       predniSONE (STERAPRED DS) 10 mg dose pack Comments:   Reason for Stopping:                 Patient Follow Up Instructions:    Diet as before  Activity as before  Check TSH/FT4 at PCP office next visit  Return to ER or call CARDIOLOGY immediately if chest pain or symptoms gets worse or re-occur.     Follow-up Information     Follow up With Specialties Details Why Contact Info    Shoshana Franks NP Nurse Practitioner In 1 week check TSH and FT4 [address] [facility] [id number] [phone]      Hunter Ojeda MD Cardiology, [address] [place] [address] Vascular Surgery On 12/3/2021 10:20 am  [address] [place] [address]  [address] [facility]  [phone]          ________________________________________________________________    Risk of deterioration: Low    Condition at Discharge:  Stable  __________________________________________________________________    Disposition  Home with family, no needs    ____________________________________________________________________    Code Status: Full Code  ___________________________________________________________________      Total time in minutes spent coordinating this discharge  31 minutes    Signed:  MD Keron Lord

## 2021-11-30 NOTE — PROGRESS NOTES
CARDIOLOGY PROGRESS NOTE        [address], [place], [address] [facility]  Phone [phone]; Fax [phone]          2021 9:07 AM       Admit Date:           2021  Admit Diagnosis:  Chest pain [R07.9]  :          1960   MRN:          [id number]        Assessment/Plan  1. CAD stenting in 2021/PCI RCA 2021 on Effient: stress test done yesterday shows There is a defect that is large in size with a severe reduction in uptake of the inferior wall(s) that is partially reversible. The defect appears to be infarction with mariam-infarct ischemia. Start imdur 30 mg every day. D/W pt verbalizes understanding. 2. Dyslipidemia on Crestor  3. Hypertension on metoprolol lisinopril  4. Rheumatoid arthritis       OP follow up Dr Anneliese Khanna  10:20 am   OK for discharge            We discussed the expected course, resolution and complications of the diagnosis(es) in detail. Medication risks, benefits, costs, interactions, and alternatives were discussed as indicated. No intake/output data recorded. Last 3 Recorded Weights in this Encounter    21 1414 21 1309   Weight: 72.6 kg (160 lb) 72.6 kg (160 lb)          1901 -  0700  In: -   Out: 4175 [[id number]]    SUBJECTIVE      Admitted with chest pain         Shweta Nephew reports none.       Current Facility-Administered Medications   Medication Dose Route Frequency    isosorbide mononitrate ER (IMDUR) tablet 30 mg  30 mg Oral DAILY    aspirin chewable tablet 81 mg  81 mg Oral DAILY    cetirizine (ZYRTEC) tablet 10 mg  10 mg Oral DAILY    leflunomide (ARAVA) tablet 20 mg  20 mg Oral QPM    levothyroxine (SYNTHROID) tablet 100 mcg  100 mcg Oral ACB    lisinopriL (PRINIVIL, ZESTRIL) tablet 10 mg  10 mg Oral DAILY    metoprolol tartrate (LOPRESSOR) tablet 25 mg  25 mg Oral Q12H    prasugreL (EFFIENT) tablet 10 mg  10 mg Oral DAILY    promethazine (PHENERGAN) tablet 25 mg  25 mg Oral QHS    rosuvastatin (CRESTOR) tablet 20 mg  20 mg Oral QHS    sodium chloride (NS) flush 5-40 mL  5-40 mL IntraVENous Q8H    sodium chloride (NS) flush 5-40 mL  5-40 mL IntraVENous PRN    acetaminophen (TYLENOL) tablet 650 mg  650 mg Oral Q6H PRN    Or    acetaminophen (TYLENOL) suppository 650 mg  650 mg Rectal Q6H PRN    polyethylene glycol (MIRALAX) packet 17 g  17 g Oral DAILY PRN    ondansetron (ZOFRAN ODT) tablet 4 mg  4 mg Oral Q8H PRN    Or    ondansetron (ZOFRAN) injection 4 mg  4 mg IntraVENous Q6H PRN    enoxaparin (LOVENOX) injection 40 mg  40 mg SubCUTAneous DAILY    nitroglycerin (NITROSTAT) tablet 0.4 mg  0.4 mg SubLINGual PRN    morphine injection 2 mg  2 mg IntraVENous Q4H PRN     Current Outpatient Medications   Medication Sig    isosorbide mononitrate ER (IMDUR) 30 mg tablet Take 1 Tablet by mouth daily.  leflunomide (ARAVA) 20 mg tablet Take 20 mg by mouth every evening. 5 pm    etanercept (EnbreL) 25 mg/0.5mL     1) subcutaneous injection 50 mg by SubCUTAneous route every seven (7) days. Q Tuesday    aspirin 81 mg chewable tablet Take 1 Tablet by mouth daily.  lisinopriL (PRINIVIL, ZESTRIL) 10 mg tablet Take 1 Tablet by mouth daily.  metoprolol tartrate (LOPRESSOR) 25 mg tablet Take 1 Tablet by mouth every twelve (12) hours.  prasugreL (EFFIENT) 10 mg tablet Take 1 Tablet by mouth daily.  rosuvastatin (CRESTOR) 20 mg tablet Take 1 Tablet by mouth nightly.  levothyroxine (SYNTHROID) 100 mcg tablet TAKE ONE TABLET BY MOUTH DAILY BEFORE BREAKFAST    promethazine (PHENERGAN) 25 mg tablet TAKE ONE TABLET BY MOUTH ONCE NIGHTLY    cetirizine (ZYRTEC) 10 mg tablet Take 1 Tab by mouth daily.       OBJECTIVE             No intake or output data in the 24 hours ending 11/30/21 0907    Review of Systems - History obtained from the patient AS PER  HPI        PHYSICAL EXAM        Visit Vitals  /71   Pulse 72   Temp 97.6 °F (36.4 °C)   Resp 11   Ht 5' 8\" (1.727 m)   Wt 72.6 kg (160 lb)   SpO2 97%   BMI 24.33 kg/m²       Gen: Well-developed, well-nourished, in no acute distress  alert and oriented x 3  HEENT:  Pink conjunctivae, Hearing grossly normal.No scleral icterus or conjunctival, moist mucous membranes  Neck: Supple,No JVD, No Carotid Bruit, Thyroid- non tender No cervical lymphadenopathy  Resp: No accessory muscle use, Clear breath sounds, No rales or rhonchi  Card: Regular Rate,Rythm,Normal S1, S2, No murmurs, rubs or gallop. No thrills. MSK: No cyanosis or clubbing, good capillary refill  Skin: No rashes or ulcers, no bruising  Neuro:  Moving all four extremities, no focal deficit, follows commands appropriately  Psych:  Good insight, oriented to person, place and time, alert, Nml Affect  LE: No edema       DATA REVIEW      1) echocardiogram  (8/16/2021): EF 45 to 50% mild MR    2) cardiac catheterization  (8/14/2021): Acute inferior MI status post primary PCI of proximal RCA with a 3.25 x 15 mm PALMER postdilated to 3.75  There was distal embolization to the PDA and mid posterior lateral.,  Diffuse  mid LAD disease the EDP was 21 mmHg    3) cholesterol  (9/23/2021): , HDL 72, LDL 70,      11/28/21    NUCLEAR CARDIAC STRESS TEST 11/29/2021 11/29/2021    Interpretation Summary  · Baseline ECG: Normal sinus rhythm, non-specific ST-T wave abnormalities. · SPECT: Left ventricular function post-stress was abnormal. Calculated ejection fraction is 45% (normal EF value is equal to or greater than 50%). · Stress test: Exercise stress test: No significant ecg changes with lexiscan. .  · SPECT: Left ventricular perfusion is abnormal. Myocardial perfusion imaging defect 1: There is a defect that is large in size with a severe reduction in uptake of the inferior wall(s) that is partially reversible. The defect appears to be infarction with mariam-infarct ischemia.   · SPECT: Left ventricular perfusion is abnormal. Myocardial perfusion imaging supports a high risk stress test.    Signed by: Heather Haines DO on 11/29/2021  8:33 PM          Cardiac monitor: SR         Laboratory and Imaging have been reviewed by me and are notable for  No results for input(s): CPK, CKMB, TROIQ in the last 72 hours.   Recent Labs     11/29/21  0157 11/28/21  1425    140   K 4.4 4.1   CO2 24 26   BUN 14 11   CREA 0.66* 0.76   * 95   PHOS 3.2  --    MG 2.2 2.0   WBC 7.8 8.3   HGB 12.8 14.1   HCT 37.4 40.9    161             Екатерина Pair, NP

## 2021-12-03 ENCOUNTER — OFFICE VISIT (OUTPATIENT)
Dept: CARDIOLOGY CLINIC | Age: 61
End: 2021-12-03

## 2021-12-03 VITALS
HEIGHT: 68 IN | DIASTOLIC BLOOD PRESSURE: 68 MMHG | SYSTOLIC BLOOD PRESSURE: 108 MMHG | BODY MASS INDEX: 23.76 KG/M2 | OXYGEN SATURATION: 97 % | HEART RATE: 52 BPM | WEIGHT: 156.8 LBS

## 2021-12-03 DIAGNOSIS — I21.11 ST ELEVATION MYOCARDIAL INFARCTION INVOLVING RIGHT CORONARY ARTERY (HCC): Primary | ICD-10-CM

## 2021-12-03 DIAGNOSIS — R00.1 BRADYCARDIA: ICD-10-CM

## 2021-12-03 DIAGNOSIS — E78.5 DYSLIPIDEMIA: ICD-10-CM

## 2021-12-03 DIAGNOSIS — I10 ESSENTIAL HYPERTENSION: ICD-10-CM

## 2021-12-03 PROCEDURE — 99214 OFFICE O/P EST MOD 30 MIN: CPT | Performed by: INTERNAL MEDICINE

## 2021-12-03 RX ORDER — METOPROLOL TARTRATE 25 MG/1
12.5 TABLET, FILM COATED ORAL EVERY 12 HOURS
Qty: 45 TABLET | Refills: 4 | Status: SHIPPED | OUTPATIENT
Start: 2021-12-03 | End: 2022-10-03

## 2021-12-03 NOTE — PROGRESS NOTES
All orders entered per verbal orders of Dr. Anjel Riggins Metorprolol to HALF tablet of 25mg po twice daily    Refill per VO of Dr. Simon Spring  Last appt: 9/8/2021  Future Appointments   Date Time Provider James Ceron   3/9/2022  2:00 PM TRACEE CEDENO   3/9/2022  2:40 PM Myles Benson MD [facility]       Requested Prescriptions     Pending Prescriptions Disp Refills    metoprolol tartrate (LOPRESSOR) 25 mg tablet 45 Tablet 4     Sig: Take 0.5 Tablets by mouth every twelve (12) hours.

## 2021-12-03 NOTE — PROGRESS NOTES
Christy Perera is a 64 y.o. male    Chief Complaint   Patient presents with    Follow-up     ED 11/28  STEMI Abn stress        Chest pain No    SOB slight SOB with exertions    Dizziness No    Swelling No    Refills No    Visit Vitals  /68 (BP 1 Location: Left upper arm, BP Patient Position: Sitting)   Pulse (!) 52   Ht 5' 8\" (1.727 m)   Wt 156 lb 12.8 oz (71.1 kg)   SpO2 97%   BMI 23.84 kg/m²       1. Have you been to the ER, urgent care clinic since your last visit? Hospitalized since your last visit? ED 11/28    2. Have you seen or consulted any other health care providers outside of the [address] [place] [address] since your last visit? Include any pap smears or colon screening.   No

## 2021-12-03 NOTE — PROGRESS NOTES
Office Follow-up    NAME: Bhupendra Fitch   :  1960  MRM:  [id number]    Date:  12/3/2021            Assessment:     Problem List  Date Reviewed: 2021          Codes Class Noted    STEMI (ST elevation myocardial infarction) ([facility] [address] ICD-10-CM: I21.3  ICD-9-CM: 410.90  2021        Acquired hypothyroidism ICD-10-CM: E03.9  ICD-9-CM: 244.9  3/22/2016        Other and unspecified derangement of medial meniscus ICD-10-CM: M23.305  ICD-9-CM: 717.3  2011                 Plan:     1. Chest pain: Symptoms have resolved. Continue current medications. History of ST elevation MI in 2021 with PCI to the RCA. 2. Fatigue: This could be due to bradycardia. Will cut the dose of metoprolol to half tablet twice daily. Continue all other medications. 3. Bradycardia: As above. 4. ST elevation MI status post PCI RCA (2021): Residual mild to moderate LAD disease. Continue medical management. Continue aspirin, Effient, statins, beta-blocker. He does not have insurance and is currently getting medications via free clinic in [place]. Referal for cardiac rehab has been made and he is trying to figure out how he can get it scheduled withu insurance. 5. Hypertension: Blood pressure is controlled. Continue metoprolol and lisinopril. We are reducing the dosage of metoprolol due to low blood pressure. 6. Dyslipidemia: Continue Crestor. Goal LDL 70 or below. 7. History of rheumatoid arthritis. Currently being treated with Enbrel, prednisone. 8. Keep previously made appt for followup with same day Echo. ATTENTION:   This medical record was transcribed using an electronic medical records/speech recognition system. Although proofread, it may and can contain electronic, spelling and other errors. Corrections may be executed at a later time. Please feel free to contact us for any clarifications as needed.          Subjective:     Bhupendra Fitch, a 64y.o. year-old who presents for followup. He was recently admitted to the hospital on August 14, 2021 with ST elevation MI. He underwent PCI to the RCA. He has residual mild to moderate disease of the LAD. His LVEF was at 40 to 45% with inferior wall motion abnormalities. He had intermittent nonsustained ventricular tachycardia peripheral to ST elevation MI however close to discharge he did not have any significant arrhythmias. He was readmitted to the ER on November 29, 2021 with symptoms of chest pain. It was thought to be noncardiac in nature. He underwent a stress test which demonstrated inferior infarct with mariam-infarct ischemia. He was discharged on isosorbide mononitrate. He is returning today for follow-up. He has had no significant chest pain since discharge. Is been having some headache after starting isosorbide mononitrate. He also feels fatigued. His home blood pressure readings have ranged low as low as 97 systolic. His heart rate has also ranged in fifties. Exam:     Physical Exam:  Visit Vitals  /68 (BP 1 Location: Left upper arm, BP Patient Position: Sitting)   Pulse (!) 52   Ht 5' 8\" (1.727 m)   Wt 156 lb 12.8 oz (71.1 kg)   SpO2 97%   BMI 23.84 kg/m²     General appearance - alert, well appearing, and in no distress  Mental status - affect appropriate to mood  Eyes - sclera anicteric, moist mucous membranes  Neck - supple, no significant adenopathy  Chest - clear to auscultation, no wheezes, rales or rhonchi  Heart - normal rate, regular rhythm, normal S1, S2, no murmurs, rubs, clicks or gallops  Abdomen - soft, nontender, nondistended, no masses or organomegaly  Extremities - peripheral pulses normal, no pedal edema  Skin - normal coloration  no rashes    Medications:     Current Outpatient Medications   Medication Sig    isosorbide mononitrate ER (IMDUR) 30 mg tablet Take 1 Tablet by mouth daily.  leflunomide (ARAVA) 20 mg tablet Take 20 mg by mouth every evening.  5 pm    etanercept (EnbreL) 25 mg/0.5mL     1) subcutaneous injection 50 mg by SubCUTAneous route every seven (7) days. Q Tuesday    aspirin 81 mg chewable tablet Take 1 Tablet by mouth daily.  lisinopriL (PRINIVIL, ZESTRIL) 10 mg tablet Take 1 Tablet by mouth daily.  metoprolol tartrate (LOPRESSOR) 25 mg tablet Take 1 Tablet by mouth every twelve (12) hours.  prasugreL (EFFIENT) 10 mg tablet Take 1 Tablet by mouth daily.  rosuvastatin (CRESTOR) 20 mg tablet Take 1 Tablet by mouth nightly.  levothyroxine (SYNTHROID) 100 mcg tablet TAKE ONE TABLET BY MOUTH DAILY BEFORE BREAKFAST    promethazine (PHENERGAN) 25 mg tablet TAKE ONE TABLET BY MOUTH ONCE NIGHTLY    cetirizine (ZYRTEC) 10 mg tablet Take 1 Tab by mouth daily. No current facility-administered medications for this visit.       Diagnostic Data Review:       1) echocardiogram  (8/16/2021): EF 45 to 50% mild MR    2) cardiac catheterization  (8/14/2021): Acute inferior MI status post primary PCI of proximal RCA with a 3.25 x 15 mm PALMER postdilated to 3.75  There was distal embolization to the PDA and mid posterior lateral.,  Diffuse  mid LAD disease the EDP was 21 mmHg    3) cholesterol  (9/23/2021): , HDL 72, LDL 70,       Lab Review:     Lab Results   Component Value Date/Time    Cholesterol, total 166 09/23/2021 09:45 AM    HDL Cholesterol 72 09/23/2021 09:45 AM    LDL, calculated 70.4 09/23/2021 09:45 AM    Triglyceride 118 09/23/2021 09:45 AM    CHOL/HDL Ratio 2.3 09/23/2021 09:45 AM     Lab Results   Component Value Date/Time    Creatinine 0.66 (L) 11/29/2021 01:57 AM     Lab Results   Component Value Date/Time    BUN 14 11/29/2021 01:57 AM     Lab Results   Component Value Date/Time    Potassium 4.4 11/29/2021 01:57 AM     Lab Results   Component Value Date/Time    Hemoglobin A1c 5.4 09/23/2021 09:45 AM     Lab Results   Component Value Date/Time    HGB 12.8 11/29/2021 01:57 AM     Lab Results   Component Value Date/Time    PLATELET 582 14/68/2657 01:57 AM     No results for input(s): CPK, CKMB, TROIQ in the last 72 hours. No lab exists for component: RADHA, GUERRERO             ___________________________________________________    Jennifer Arellano.  Jeaneth Rose MD, [facility]

## 2021-12-09 ENCOUNTER — HOSPITAL ENCOUNTER (OUTPATIENT)
Dept: LAB | Age: 61
Discharge: HOME | End: 2021-12-09

## 2021-12-09 PROCEDURE — 84439 ASSAY OF FREE THYROXINE: CPT

## 2021-12-09 PROCEDURE — 84443 ASSAY THYROID STIM HORMONE: CPT

## 2021-12-10 LAB
T4 FREE SERPL-MCNC: 1.3 NG/DL (ref 0.8–1.5)
TSH SERPL DL<=0.05 MIU/L-ACNC: 0.16 UIU/ML (ref 0.36–3.74)

## 2022-01-24 RX ORDER — ISOSORBIDE MONONITRATE 30 MG/1
TABLET, EXTENDED RELEASE ORAL
Qty: 30 TABLET | Refills: 0 | Status: SHIPPED | OUTPATIENT
Start: 2022-01-24 | End: 2022-02-24

## 2022-01-27 ENCOUNTER — HOSPITAL ENCOUNTER (OUTPATIENT)
Dept: LAB | Age: 62
Discharge: HOME | End: 2022-01-27

## 2022-01-27 PROCEDURE — 84443 ASSAY THYROID STIM HORMONE: CPT

## 2022-01-27 PROCEDURE — 84439 ASSAY OF FREE THYROXINE: CPT

## 2022-01-28 LAB
T4 FREE SERPL-MCNC: 1.1 NG/DL (ref 0.8–1.5)
TSH SERPL DL<=0.05 MIU/L-ACNC: 0.95 UIU/ML (ref 0.36–3.74)

## 2022-02-24 RX ORDER — ISOSORBIDE MONONITRATE 30 MG/1
TABLET, EXTENDED RELEASE ORAL
Qty: 30 TABLET | Refills: 0 | Status: SHIPPED | OUTPATIENT
Start: 2022-02-24 | End: 2022-03-29

## 2022-03-09 ENCOUNTER — ANCILLARY PROCEDURE (OUTPATIENT)
Dept: CARDIOLOGY CLINIC | Age: 62
End: 2022-03-09

## 2022-03-09 ENCOUNTER — OFFICE VISIT (OUTPATIENT)
Dept: CARDIOLOGY CLINIC | Age: 62
End: 2022-03-09

## 2022-03-09 VITALS
SYSTOLIC BLOOD PRESSURE: 110 MMHG | HEIGHT: 68 IN | DIASTOLIC BLOOD PRESSURE: 62 MMHG | BODY MASS INDEX: 24.86 KG/M2 | WEIGHT: 164 LBS

## 2022-03-09 VITALS
BODY MASS INDEX: 24.86 KG/M2 | HEIGHT: 68 IN | SYSTOLIC BLOOD PRESSURE: 110 MMHG | OXYGEN SATURATION: 97 % | DIASTOLIC BLOOD PRESSURE: 62 MMHG | WEIGHT: 164 LBS | HEART RATE: 56 BPM

## 2022-03-09 DIAGNOSIS — I21.11 ST ELEVATION MYOCARDIAL INFARCTION INVOLVING RIGHT CORONARY ARTERY (HCC): Primary | ICD-10-CM

## 2022-03-09 DIAGNOSIS — I10 ESSENTIAL HYPERTENSION: ICD-10-CM

## 2022-03-09 DIAGNOSIS — E78.5 DYSLIPIDEMIA: ICD-10-CM

## 2022-03-09 DIAGNOSIS — I21.11 ST ELEVATION MYOCARDIAL INFARCTION INVOLVING RIGHT CORONARY ARTERY (HCC): ICD-10-CM

## 2022-03-09 LAB
ECHO AO ROOT DIAM: 3 CM
ECHO AO ROOT INDEX: 1.6 CM/M2
ECHO AV AREA PEAK VELOCITY: 2.2 CM2
ECHO AV AREA PEAK VELOCITY: 2.2 CM2
ECHO AV AREA VTI: 2.5 CM2
ECHO AV AREA VTI: 2.5 CM2
ECHO AV MEAN GRADIENT: 3 MMHG
ECHO AV MEAN VELOCITY: 0.9 M/S
ECHO AV PEAK GRADIENT: 6 MMHG
ECHO AV PEAK VELOCITY: 1.2 M/S
ECHO AV VELOCITY RATIO: 0.75
ECHO AV VTI: 24.9 CM
ECHO EST RA PRESSURE: 3 MMHG
ECHO LA DIAMETER INDEX: 1.91 CM/M2
ECHO LA DIAMETER: 3.6 CM
ECHO LA TO AORTIC ROOT RATIO: 1.2
ECHO LA VOL 2C: 34 ML (ref 18–58)
ECHO LA VOL 4C: 40 ML (ref 18–58)
ECHO LA VOL BP: 40 ML (ref 18–58)
ECHO LA VOL BP: 40 ML (ref 18–58)
ECHO LA VOLUME AREA LENGTH: 44 ML
ECHO LA VOLUME INDEX A2C: 18 ML/M2 (ref 16–34)
ECHO LA VOLUME INDEX A4C: 21 ML/M2 (ref 16–34)
ECHO LA VOLUME INDEX AREA LENGTH: 23 ML/M2 (ref 16–34)
ECHO LV E' LATERAL VELOCITY: 14 CM/S
ECHO LV E' SEPTAL VELOCITY: 9 CM/S
ECHO LV EDV A2C: 133 ML
ECHO LV EDV A4C: 117 ML
ECHO LV EDV BP: 125 ML (ref 67–155)
ECHO LV EDV INDEX A4C: 62 ML/M2
ECHO LV EDV INDEX BP: 66 ML/M2
ECHO LV EDV NDEX A2C: 71 ML/M2
ECHO LV EJECTION FRACTION A2C: 48 %
ECHO LV EJECTION FRACTION A4C: 54 %
ECHO LV EJECTION FRACTION BIPLANE: 50 % (ref 55–100)
ECHO LV ESV A2C: 69 ML
ECHO LV ESV A4C: 53 ML
ECHO LV ESV BP: 62 ML (ref 22–58)
ECHO LV ESV INDEX A2C: 37 ML/M2
ECHO LV ESV INDEX A4C: 28 ML/M2
ECHO LV ESV INDEX BP: 33 ML/M2
ECHO LV FRACTIONAL SHORTENING: 26 % (ref 28–44)
ECHO LV INTERNAL DIMENSION DIASTOLE INDEX: 3.03 CM/M2
ECHO LV INTERNAL DIMENSION DIASTOLIC: 5.7 CM (ref 4.2–5.9)
ECHO LV INTERNAL DIMENSION SYSTOLIC INDEX: 2.23 CM/M2
ECHO LV INTERNAL DIMENSION SYSTOLIC: 4.2 CM
ECHO LV IVSD: 0.9 CM (ref 0.6–1)
ECHO LV MASS 2D: 170.2 G (ref 88–224)
ECHO LV MASS INDEX 2D: 90.5 G/M2 (ref 49–115)
ECHO LV POSTERIOR WALL DIASTOLIC: 0.7 CM (ref 0.6–1)
ECHO LV RELATIVE WALL THICKNESS RATIO: 0.25
ECHO LVOT AREA: 3.1 CM2
ECHO LVOT AV VTI INDEX: 0.8
ECHO LVOT DIAM: 2 CM
ECHO LVOT MEAN GRADIENT: 2 MMHG
ECHO LVOT PEAK GRADIENT: 3 MMHG
ECHO LVOT PEAK VELOCITY: 0.9 M/S
ECHO LVOT STROKE VOLUME INDEX: 33.2 ML/M2
ECHO LVOT SV: 62.5 ML
ECHO LVOT VTI: 19.9 CM
ECHO MV A VELOCITY: 0.75 M/S
ECHO MV AREA PHT: 3.9 CM2
ECHO MV E DECELERATION TIME (DT): 193.3 MS
ECHO MV E VELOCITY: 0.91 M/S
ECHO MV E/A RATIO: 1.21
ECHO MV E/E' LATERAL: 6.5
ECHO MV E/E' RATIO (AVERAGED): 8.31
ECHO MV E/E' SEPTAL: 10.11
ECHO MV PRESSURE HALF TIME (PHT): 56 MS
ECHO RIGHT VENTRICULAR SYSTOLIC PRESSURE (RVSP): 25 MMHG
ECHO RV FREE WALL PEAK S': 10 CM/S
ECHO RV INTERNAL DIMENSION: 3.2 CM
ECHO RV TAPSE: 1.8 CM (ref 1.5–2)
ECHO TV REGURGITANT MAX VELOCITY: 2.32 M/S
ECHO TV REGURGITANT PEAK GRADIENT: 22 MMHG

## 2022-03-09 PROCEDURE — 99214 OFFICE O/P EST MOD 30 MIN: CPT | Performed by: INTERNAL MEDICINE

## 2022-03-09 PROCEDURE — 93306 TTE W/DOPPLER COMPLETE: CPT | Performed by: INTERNAL MEDICINE

## 2022-03-09 NOTE — PROGRESS NOTES
Office Follow-up    NAME: Andrea Carrasquillo   :  1960  MRM:  [id number]    Date:  3/9/2022         Assessment and Plan:     1. Chest pain: Symptoms have resolved. Continue current medications. History of ST elevation MI in 2021 with PCI to the RCA. 2. Fatigue: Improved with lower dose of Lopressor. 3. Bradycardia: Improved with cutting back Lopressor. 4. ST elevation MI status post PCI RCA (2021): Residual mild to moderate LAD disease. Continue medical management. Continue aspirin, Effient, statins, beta-blocker. He does not have insurance and is currently getting medications via free clinic in [place]. Could not get cardiac rehab due to insurance. 5. Hypertension: Blood pressure is controlled. Continue metoprolol and lisinopril. We are reducing the dosage of metoprolol due to low blood pressure. 6. Dyslipidemia: Continue Crestor. Goal LDL 70 or below. 7. History of rheumatoid arthritis. Currently being treated with Enbrel, prednisone. 8. See Dr. Yulisa Granado in 6 months. ATTENTION:   This medical record was transcribed using an electronic medical records/speech recognition system. Although proofread, it may and can contain electronic, spelling and other errors. Corrections may be executed at a later time. Please feel free to contact us for any clarifications as needed. Subjective:     Andrea Carrasquillo, a 64y.o. year-old who presents for followup. Denies symp[toms of chest pain, SOB, palpitations. Fatigue has improved.      Exam:     Physical Exam:  Visit Vitals  /62 (BP 1 Location: Left upper arm, BP Patient Position: Sitting)   Pulse (!) 56   Ht 5' 8\" (1.727 m)   Wt 164 lb (74.4 kg)   SpO2 97%   BMI 24.94 kg/m²     General appearance - alert, well appearing, and in no distress  Mental status - affect appropriate to mood  Eyes - sclera anicteric, moist mucous membranes  Neck - supple, no significant adenopathy  Chest - clear to auscultation, no wheezes, rales or rhonchi  Heart - normal rate, regular rhythm, normal S1, S2, no murmurs, rubs, clicks or gallops  Abdomen - soft, nontender, nondistended, no masses or organomegaly  Extremities - peripheral pulses normal, no pedal edema  Skin - normal coloration  no rashes    Medications:     Current Outpatient Medications   Medication Sig    isosorbide mononitrate ER (IMDUR) 30 mg tablet Take 1 tablet by mouth once daily    metoprolol tartrate (LOPRESSOR) 25 mg tablet Take 0.5 Tablets by mouth every twelve (12) hours.  leflunomide (ARAVA) 20 mg tablet Take 20 mg by mouth every evening. 5 pm    etanercept (EnbreL) 25 mg/0.5mL     1) subcutaneous injection 50 mg by SubCUTAneous route every seven (7) days. Q Tuesday    aspirin 81 mg chewable tablet Take 1 Tablet by mouth daily.  lisinopriL (PRINIVIL, ZESTRIL) 10 mg tablet Take 1 Tablet by mouth daily.  prasugreL (EFFIENT) 10 mg tablet Take 1 Tablet by mouth daily.  rosuvastatin (CRESTOR) 20 mg tablet Take 1 Tablet by mouth nightly.  levothyroxine (SYNTHROID) 100 mcg tablet TAKE ONE TABLET BY MOUTH DAILY BEFORE BREAKFAST (Patient taking differently: 88 mcg.)    promethazine (PHENERGAN) 25 mg tablet TAKE ONE TABLET BY MOUTH ONCE NIGHTLY    cetirizine (ZYRTEC) 10 mg tablet Take 1 Tab by mouth daily. No current facility-administered medications for this visit.       Diagnostic Data Review:       1) echocardiogram  (8/16/2021): EF 45 to 50% mild MR    2) cardiac catheterization  (8/14/2021): Acute inferior MI status post primary PCI of proximal RCA with a 3.25 x 15 mm PALMER postdilated to 3.75  There was distal embolization to the PDA and mid posterior lateral.,  Diffuse  mid LAD disease the EDP was 21 mmHg    3) cholesterol  (9/23/2021): , HDL 72, LDL 70,       Lab Review:     Lab Results   Component Value Date/Time    Cholesterol, total 166 09/23/2021 09:45 AM    HDL Cholesterol 72 09/23/2021 09:45 AM    LDL, calculated 70.4 09/23/2021 09:45 AM Triglyceride 118 09/23/2021 09:45 AM    CHOL/HDL Ratio 2.3 09/23/2021 09:45 AM     Lab Results   Component Value Date/Time    Creatinine 0.66 (L) 11/29/2021 01:57 AM     Lab Results   Component Value Date/Time    BUN 14 11/29/2021 01:57 AM     Lab Results   Component Value Date/Time    Potassium 4.4 11/29/2021 01:57 AM     Lab Results   Component Value Date/Time    Hemoglobin A1c 5.4 09/23/2021 09:45 AM     Lab Results   Component Value Date/Time    HGB 12.8 11/29/2021 01:57 AM     Lab Results   Component Value Date/Time    PLATELET 738 16/86/3379 01:57 AM     No results for input(s): CPK, CKMB, TROIQ in the last 72 hours. No lab exists for component: CKQMB, CPKMB             ___________________________________________________    Clare Silverman.  Clyde Reilly MD, [facility] [place]

## 2022-03-09 NOTE — PROGRESS NOTES
Chief Complaint   Patient presents with    Cardiac Testing     TODAY WITH [facility]DELS[facility]    Hypertension    Cholesterol Problem    Slow Heart Rate    Follow-up     6 MONTH     Visit Vitals  /62 (BP 1 Location: Left upper arm, BP Patient Position: Sitting)   Pulse (!) 56   Ht 5' 8\" (1.727 m)   Wt 164 lb (74.4 kg)   SpO2 97%   BMI 24.94 kg/m²     Chest pain denied     Palpations denied    SOB denied    Dizziness denied    Swelling in hands/feet denied     Recent hospital stays denied

## 2022-03-18 PROBLEM — I21.3 STEMI (ST ELEVATION MYOCARDIAL INFARCTION) (HCC): Status: ACTIVE | Noted: 2021-08-14

## 2022-03-29 RX ORDER — ISOSORBIDE MONONITRATE 30 MG/1
TABLET, EXTENDED RELEASE ORAL
Qty: 30 TABLET | Refills: 0 | Status: SHIPPED | OUTPATIENT
Start: 2022-03-29 | End: 2022-04-27

## 2022-04-27 RX ORDER — ISOSORBIDE MONONITRATE 30 MG/1
TABLET, EXTENDED RELEASE ORAL
Qty: 30 TABLET | Refills: 0 | Status: SHIPPED | OUTPATIENT
Start: 2022-04-27 | End: 2022-05-24

## 2022-05-24 RX ORDER — ISOSORBIDE MONONITRATE 30 MG/1
TABLET, EXTENDED RELEASE ORAL
Qty: 30 TABLET | Refills: 0 | Status: SHIPPED | OUTPATIENT
Start: 2022-05-24 | End: 2022-06-28

## 2022-06-28 RX ORDER — ISOSORBIDE MONONITRATE 30 MG/1
TABLET, EXTENDED RELEASE ORAL
Qty: 30 TABLET | Refills: 0 | Status: SHIPPED | OUTPATIENT
Start: 2022-06-28 | End: 2022-07-27

## 2022-07-27 RX ORDER — ISOSORBIDE MONONITRATE 30 MG/1
TABLET, EXTENDED RELEASE ORAL
Qty: 30 TABLET | Refills: 0 | Status: SHIPPED | OUTPATIENT
Start: 2022-07-27 | End: 2022-08-18

## 2022-08-02 RX ORDER — PRASUGREL 10 MG/1
TABLET, FILM COATED ORAL
Qty: 300 TABLET | Refills: 0 | OUTPATIENT
Start: 2022-08-02

## 2022-08-08 ENCOUNTER — TELEPHONE (OUTPATIENT)
Dept: CARDIOLOGY CLINIC | Age: 62
End: 2022-08-08

## 2022-08-08 RX ORDER — PRASUGREL 10 MG/1
10 TABLET, FILM COATED ORAL DAILY
Qty: 90 TABLET | Refills: 0 | Status: SHIPPED | OUTPATIENT
Start: 2022-08-08 | End: 2022-10-07 | Stop reason: ALTCHOICE

## 2022-08-08 NOTE — TELEPHONE ENCOUNTER
Patient is calling because he would like to know if the doctor can call him in a rx for Prasugrel 10 mg. Patient received this medicine when he was in the hospital.Patient has a weeks worth of medicine left. Pharmacy is confirmed.     [phone]

## 2022-08-08 NOTE — TELEPHONE ENCOUNTER
Refill per VO of Dr. Thania Osuna:    Last appt: 3/9/2022    Future Appointments   Date Time Provider James Ceron   9/9/2022  1:40 PM Nghia Benson MD [facility] AM[facility]       Requested Prescriptions      No prescriptions requested or ordered in this encounter

## 2022-08-18 RX ORDER — ISOSORBIDE MONONITRATE 30 MG/1
TABLET, EXTENDED RELEASE ORAL
Qty: 30 TABLET | Refills: 0 | Status: SHIPPED | OUTPATIENT
Start: 2022-08-18 | End: 2022-09-19

## 2022-09-19 RX ORDER — ISOSORBIDE MONONITRATE 30 MG/1
TABLET, EXTENDED RELEASE ORAL
Qty: 30 TABLET | Refills: 0 | Status: SHIPPED | OUTPATIENT
Start: 2022-09-19 | End: 2022-10-24

## 2022-10-03 RX ORDER — METOPROLOL TARTRATE 25 MG/1
TABLET, FILM COATED ORAL
Qty: 45 TABLET | Refills: 0 | Status: SHIPPED | OUTPATIENT
Start: 2022-10-03 | End: 2022-10-31

## 2022-10-03 NOTE — TELEPHONE ENCOUNTER
Refill per VO of Dr. Mauro Orts:    Last appt: 3/9/2022    Future Appointments   Date Time Provider James Jie   10/7/2022  1:40 PM Gretchen Benson MD [facility] AMB       Requested Prescriptions     Pending Prescriptions Disp Refills    metoprolol tartrate (LOPRESSOR) 25 mg tablet [Pharmacy Med Name: Metoprolol Tartrate 25 MG Oral Tablet] 45 Tablet 0     Sig: TAKE 1/2 (ONE-HALF) TABLET BY MOUTH EVERY 12 HOURS

## 2022-10-07 ENCOUNTER — OFFICE VISIT (OUTPATIENT)
Dept: CARDIOLOGY CLINIC | Age: 62
End: 2022-10-07

## 2022-10-07 VITALS
DIASTOLIC BLOOD PRESSURE: 60 MMHG | HEIGHT: 68 IN | BODY MASS INDEX: 23.04 KG/M2 | OXYGEN SATURATION: 98 % | HEART RATE: 62 BPM | WEIGHT: 152 LBS | SYSTOLIC BLOOD PRESSURE: 120 MMHG

## 2022-10-07 DIAGNOSIS — I25.10 CORONARY ARTERY DISEASE DUE TO LIPID RICH PLAQUE: Primary | ICD-10-CM

## 2022-10-07 DIAGNOSIS — I25.83 CORONARY ARTERY DISEASE DUE TO LIPID RICH PLAQUE: Primary | ICD-10-CM

## 2022-10-07 DIAGNOSIS — R00.1 BRADYCARDIA: ICD-10-CM

## 2022-10-07 DIAGNOSIS — E78.5 DYSLIPIDEMIA: ICD-10-CM

## 2022-10-07 DIAGNOSIS — I10 ESSENTIAL HYPERTENSION: ICD-10-CM

## 2022-10-07 PROCEDURE — 99214 OFFICE O/P EST MOD 30 MIN: CPT | Performed by: INTERNAL MEDICINE

## 2022-10-07 NOTE — PROGRESS NOTES
Chief Complaint   Patient presents with    Follow-up     6month  STEMI       Vitals:    10/07/22 1335   BP: 120/60   Pulse: 62   Height: 5' 8\" (1.727 m)   Weight: 152 lb (68.9 kg)   SpO2: 98%         Chest pain: no    SOB: no    Palpitations: no    Dizziness: no    Swelling: no    Refills:       Have you been to the ER, urgent care clinic since your last visit? Hospitalized since your last visit? no    Have you sen or consulted other health care providers outside of the [facility] since your last visit?  (Include any pap smears or colon screening.)

## 2022-10-07 NOTE — PROGRESS NOTES
All orders entered per verbal orders of Dr. Gretchen Baca. MD Reed Benson     See Dr. Marlena Clark in 1 year.

## 2022-10-07 NOTE — PROGRESS NOTES
Office Follow-up    NAME: Shirley Carrera   :  1960  MRM:  [id number]    Date:  10/7/2022         Assessment and Plan:     Chest pain: Symptoms have resolved. Continue current medications. History of ST elevation MI in 2021 with PCI to the RCA. Fatigue: Improved with lower dose of Lopressor. Bradycardia: Improved with cutting back Lopressor. ST elevation MI status post PCI RCA (2021): Residual mild to moderate LAD disease. Continue medical management. Continue aspirin, Effient, statins, beta-blocker. He does not have insurance and is currently getting medications via free clinic in [facility]. Could not get cardiac rehab due to insurance. Stop Effient as he has completed one year. Hypertension: Blood pressure is controlled. Continue metoprolol and lisinopril. We are reducing the dosage of metoprolol due to low blood pressure. Dyslipidemia: Continue Crestor. Goal LDL 70 or below. History of rheumatoid arthritis. Currently being treated with Enbrel, prednisone. See Dr. Robert Briscoe in 6 months. ATTENTION:   This medical record was transcribed using an electronic medical records/speech recognition system. Although proofread, it may and can contain electronic, spelling and other errors. Corrections may be executed at a later time. Please feel free to contact us for any clarifications as needed. Subjective:     Shirley Carrera, a 58y.o. year-old who presents for followup. Denies symp[toms of chest pain, SOB, palpitations. Fatigue has improved.      Exam:     Physical Exam:  Visit Vitals  /60   Pulse 62   Ht 5' 8\" (1.727 m)   Wt 152 lb (68.9 kg)   SpO2 98%   BMI 23.11 kg/m²     General appearance - alert, well appearing, and in no distress  Mental status - affect appropriate to mood  Eyes - sclera anicteric, moist mucous membranes  Neck - supple, no significant adenopathy  Chest - clear to auscultation, no wheezes, rales or rhonchi  Heart - normal rate, regular rhythm, normal S1, S2, no murmurs, rubs, clicks or gallops  Abdomen - soft, nontender, nondistended, no masses or organomegaly  Extremities - peripheral pulses normal, no pedal edema  Skin - normal coloration  no rashes    Medications:     Current Outpatient Medications   Medication Sig    metoprolol tartrate (LOPRESSOR) 25 mg tablet TAKE 1/2 (ONE-HALF) TABLET BY MOUTH EVERY 12 HOURS    isosorbide mononitrate ER (IMDUR) 30 mg tablet Take 1 tablet by mouth once daily    prasugreL (EFFIENT) 10 mg tablet Take 1 Tablet by mouth in the morning. leflunomide (ARAVA) 20 mg tablet Take 20 mg by mouth every evening. 5 pm    etanercept (EnbreL) 25 mg/0.5mL     1) subcutaneous injection 50 mg by SubCUTAneous route every seven (7) days. Q Tuesday    aspirin 81 mg chewable tablet Take 1 Tablet by mouth daily. lisinopriL (PRINIVIL, ZESTRIL) 10 mg tablet Take 1 Tablet by mouth daily. rosuvastatin (CRESTOR) 20 mg tablet Take 1 Tablet by mouth nightly. levothyroxine (SYNTHROID) 100 mcg tablet TAKE ONE TABLET BY MOUTH DAILY BEFORE BREAKFAST (Patient taking differently: 88 mcg.)    promethazine (PHENERGAN) 25 mg tablet TAKE ONE TABLET BY MOUTH ONCE NIGHTLY    cetirizine (ZYRTEC) 10 mg tablet Take 1 Tab by mouth daily. No current facility-administered medications for this visit.       Diagnostic Data Review:       1) echocardiogram  (8/16/2021): EF 45 to 50% mild MR    2) cardiac catheterization  (8/14/2021): Acute inferior MI status post primary PCI of proximal RCA with a 3.25 x 15 mm PALMER postdilated to 3.75  There was distal embolization to the PDA and mid posterior lateral.,  Diffuse  mid LAD disease the EDP was 21 mmHg    3) cholesterol  (9/23/2021): , HDL 72, LDL 70,       Lab Review:     Lab Results   Component Value Date/Time    Cholesterol, total 166 09/23/2021 09:45 AM    HDL Cholesterol 72 09/23/2021 09:45 AM    LDL, calculated 70.4 09/23/2021 09:45 AM    Triglyceride 118 09/23/2021 09:45 AM CHOL/HDL Ratio 2.3 09/23/2021 09:45 AM     Lab Results   Component Value Date/Time    Creatinine 0.66 (L) 11/29/2021 01:57 AM     Lab Results   Component Value Date/Time    BUN 14 11/29/2021 01:57 AM     Lab Results   Component Value Date/Time    Potassium 4.4 11/29/2021 01:57 AM     Lab Results   Component Value Date/Time    Hemoglobin A1c 5.4 09/23/2021 09:45 AM     Lab Results   Component Value Date/Time    HGB 12.8 11/29/2021 01:57 AM     Lab Results   Component Value Date/Time    PLATELET 005 11/24/5523 01:57 AM     No results for input(s): CPK, CKMB, TROIQ in the last 72 hours. No lab exists for component: CKQMB, CPKMB             ___________________________________________________    Taylor Escalante.  Cony Gaviria MD, [facility]

## 2022-10-24 RX ORDER — ISOSORBIDE MONONITRATE 30 MG/1
TABLET, EXTENDED RELEASE ORAL
Qty: 30 TABLET | Refills: 0 | Status: SHIPPED | OUTPATIENT
Start: 2022-10-24 | End: 2022-11-22

## 2022-10-31 RX ORDER — METOPROLOL TARTRATE 25 MG/1
TABLET, FILM COATED ORAL
Qty: 90 TABLET | Refills: 4 | Status: SHIPPED | OUTPATIENT
Start: 2022-10-31

## 2022-10-31 NOTE — TELEPHONE ENCOUNTER
Refill per VO of Dr. Wen Cabrera:    Last appt: 10/7/2022    Future Appointments   Date Time Provider James Ceron   10/13/2023  1:00 PM Fernandez Benson MD [facility] AMB       Requested Prescriptions     Pending Prescriptions Disp Refills    metoprolol tartrate (LOPRESSOR) 25 mg tablet [Pharmacy Med Name: Metoprolol Tartrate 25 MG Oral Tablet] 45 Tablet 0     Sig: TAKE 1/2 (ONE-HALF) TABLET BY MOUTH EVERY 12 HOURS .  APPOINTMENT REQUIRED FOR FUTURE REFILLS

## 2022-11-22 RX ORDER — ISOSORBIDE MONONITRATE 30 MG/1
TABLET, EXTENDED RELEASE ORAL
Qty: 30 TABLET | Refills: 0 | Status: SHIPPED | OUTPATIENT
Start: 2022-11-22

## 2022-12-20 RX ORDER — ISOSORBIDE MONONITRATE 30 MG/1
TABLET, EXTENDED RELEASE ORAL
Qty: 30 TABLET | Refills: 0 | Status: SHIPPED | OUTPATIENT
Start: 2022-12-20

## 2023-01-23 RX ORDER — ISOSORBIDE MONONITRATE 30 MG/1
TABLET, EXTENDED RELEASE ORAL
Qty: 30 TABLET | Refills: 0 | Status: SHIPPED | OUTPATIENT
Start: 2023-01-23

## 2023-02-22 RX ORDER — ISOSORBIDE MONONITRATE 30 MG/1
TABLET, EXTENDED RELEASE ORAL
Qty: 30 TABLET | Refills: 0 | Status: SHIPPED | OUTPATIENT
Start: 2023-02-22

## 2023-03-20 RX ORDER — ISOSORBIDE MONONITRATE 30 MG/1
TABLET, EXTENDED RELEASE ORAL
Qty: 30 TABLET | Refills: 0 | Status: SHIPPED | OUTPATIENT
Start: 2023-03-20

## 2023-04-20 RX ORDER — ISOSORBIDE MONONITRATE 30 MG/1
TABLET, EXTENDED RELEASE ORAL
Qty: 30 TABLET | Refills: 0 | Status: SHIPPED | OUTPATIENT
Start: 2023-04-20

## 2023-06-15 ENCOUNTER — TELEPHONE (OUTPATIENT)
Age: 63
End: 2023-06-15

## 2023-06-15 NOTE — TELEPHONE ENCOUNTER
Patient calling to see if Dr. Vikki Rogers can refill his Isosorbide that was given to him in the ER    If so it can be sent to the [facility] on [address] [facility] in [facility] with a 30 day supply    If needed the pt can be reached at [phone]    [facility]

## 2023-09-14 SDOH — HEALTH STABILITY: PHYSICAL HEALTH: ON AVERAGE, HOW MANY DAYS PER WEEK DO YOU ENGAGE IN MODERATE TO STRENUOUS EXERCISE (LIKE A BRISK WALK)?: 2 DAYS

## 2023-09-14 SDOH — HEALTH STABILITY: PHYSICAL HEALTH: ON AVERAGE, HOW MANY MINUTES DO YOU ENGAGE IN EXERCISE AT THIS LEVEL?: 10 MIN

## 2023-09-15 ENCOUNTER — OFFICE VISIT (OUTPATIENT)
Age: 63
End: 2023-09-15
Payer: MEDICARE

## 2023-09-15 VITALS
RESPIRATION RATE: 17 BRPM | BODY MASS INDEX: 23.46 KG/M2 | WEIGHT: 154.8 LBS | HEART RATE: 55 BPM | DIASTOLIC BLOOD PRESSURE: 70 MMHG | OXYGEN SATURATION: 97 % | TEMPERATURE: 98 F | SYSTOLIC BLOOD PRESSURE: 112 MMHG | HEIGHT: 68 IN

## 2023-09-15 DIAGNOSIS — Z00.00 ENCOUNTER FOR WELL ADULT EXAM WITHOUT ABNORMAL FINDINGS: ICD-10-CM

## 2023-09-15 DIAGNOSIS — R73.9 HYPERGLYCEMIA, UNSPECIFIED: ICD-10-CM

## 2023-09-15 DIAGNOSIS — E03.9 ACQUIRED UNDERACTIVE THYROID: ICD-10-CM

## 2023-09-15 DIAGNOSIS — Z12.12 SCREENING FOR COLORECTAL CANCER: ICD-10-CM

## 2023-09-15 DIAGNOSIS — R06.2 WHEEZING: ICD-10-CM

## 2023-09-15 DIAGNOSIS — Z11.4 SCREENING FOR HIV WITHOUT PRESENCE OF RISK FACTORS: ICD-10-CM

## 2023-09-15 DIAGNOSIS — E74.39 GLUCOSE INTOLERANCE: ICD-10-CM

## 2023-09-15 DIAGNOSIS — M06.9 RHEUMATOID ARTHRITIS INVOLVING MULTIPLE SITES, UNSPECIFIED WHETHER RHEUMATOID FACTOR PRESENT (HCC): Primary | ICD-10-CM

## 2023-09-15 DIAGNOSIS — Z12.11 SCREENING FOR COLORECTAL CANCER: ICD-10-CM

## 2023-09-15 DIAGNOSIS — I21.3 ST ELEVATION MYOCARDIAL INFARCTION (STEMI), UNSPECIFIED ARTERY (HCC): ICD-10-CM

## 2023-09-15 DIAGNOSIS — K21.9 GASTROESOPHAGEAL REFLUX DISEASE WITHOUT ESOPHAGITIS: ICD-10-CM

## 2023-09-15 DIAGNOSIS — Z13.1 SCREENING FOR DIABETES MELLITUS: ICD-10-CM

## 2023-09-15 DIAGNOSIS — Z11.59 NEED FOR HEPATITIS C SCREENING TEST: ICD-10-CM

## 2023-09-15 DIAGNOSIS — Z72.89 OTHER PROBLEMS RELATED TO LIFESTYLE: ICD-10-CM

## 2023-09-15 PROCEDURE — 99386 PREV VISIT NEW AGE 40-64: CPT | Performed by: FAMILY MEDICINE

## 2023-09-15 RX ORDER — LEVOTHYROXINE SODIUM 88 UG/1
TABLET ORAL
COMMUNITY
Start: 2023-06-26

## 2023-09-15 SDOH — ECONOMIC STABILITY: FOOD INSECURITY: WITHIN THE PAST 12 MONTHS, THE FOOD YOU BOUGHT JUST DIDN'T LAST AND YOU DIDN'T HAVE MONEY TO GET MORE.: NEVER TRUE

## 2023-09-15 SDOH — ECONOMIC STABILITY: FOOD INSECURITY: WITHIN THE PAST 12 MONTHS, YOU WORRIED THAT YOUR FOOD WOULD RUN OUT BEFORE YOU GOT MONEY TO BUY MORE.: NEVER TRUE

## 2023-09-15 SDOH — ECONOMIC STABILITY: INCOME INSECURITY: HOW HARD IS IT FOR YOU TO PAY FOR THE VERY BASICS LIKE FOOD, HOUSING, MEDICAL CARE, AND HEATING?: NOT HARD AT ALL

## 2023-09-15 SDOH — ECONOMIC STABILITY: HOUSING INSECURITY
IN THE LAST 12 MONTHS, WAS THERE A TIME WHEN YOU DID NOT HAVE A STEADY PLACE TO SLEEP OR SLEPT IN A SHELTER (INCLUDING NOW)?: NO

## 2023-09-15 ASSESSMENT — PATIENT HEALTH QUESTIONNAIRE - PHQ9
SUM OF ALL RESPONSES TO PHQ QUESTIONS 1-9: 0
SUM OF ALL RESPONSES TO PHQ QUESTIONS 1-9: 0
1. LITTLE INTEREST OR PLEASURE IN DOING THINGS: 0
SUM OF ALL RESPONSES TO PHQ9 QUESTIONS 1 & 2: 0
SUM OF ALL RESPONSES TO PHQ QUESTIONS 1-9: 0
SUM OF ALL RESPONSES TO PHQ QUESTIONS 1-9: 0
2. FEELING DOWN, DEPRESSED OR HOPELESS: 0

## 2023-09-17 DIAGNOSIS — Z12.12 SCREENING FOR COLORECTAL CANCER: ICD-10-CM

## 2023-09-17 DIAGNOSIS — Z12.11 SCREENING FOR COLORECTAL CANCER: ICD-10-CM

## 2023-09-17 ASSESSMENT — ENCOUNTER SYMPTOMS
SHORTNESS OF BREATH: 1
COUGH: 0
CHEST TIGHTNESS: 0
WHEEZING: 1

## 2023-09-19 SDOH — HEALTH STABILITY: PHYSICAL HEALTH: ON AVERAGE, HOW MANY DAYS PER WEEK DO YOU ENGAGE IN MODERATE TO STRENUOUS EXERCISE (LIKE A BRISK WALK)?: 3 DAYS

## 2023-09-19 SDOH — HEALTH STABILITY: PHYSICAL HEALTH: ON AVERAGE, HOW MANY MINUTES DO YOU ENGAGE IN EXERCISE AT THIS LEVEL?: 10 MIN

## 2023-09-19 ASSESSMENT — PATIENT HEALTH QUESTIONNAIRE - PHQ9
SUM OF ALL RESPONSES TO PHQ QUESTIONS 1-9: 0
1. LITTLE INTEREST OR PLEASURE IN DOING THINGS: 0
SUM OF ALL RESPONSES TO PHQ QUESTIONS 1-9: 0
2. FEELING DOWN, DEPRESSED OR HOPELESS: 0
SUM OF ALL RESPONSES TO PHQ9 QUESTIONS 1 & 2: 0

## 2023-09-19 ASSESSMENT — LIFESTYLE VARIABLES
HOW OFTEN DO YOU HAVE A DRINK CONTAINING ALCOHOL: NEVER
HOW MANY STANDARD DRINKS CONTAINING ALCOHOL DO YOU HAVE ON A TYPICAL DAY: 0
HOW OFTEN DO YOU HAVE A DRINK CONTAINING ALCOHOL: 1
HOW MANY STANDARD DRINKS CONTAINING ALCOHOL DO YOU HAVE ON A TYPICAL DAY: PATIENT DOES NOT DRINK
HOW OFTEN DO YOU HAVE SIX OR MORE DRINKS ON ONE OCCASION: 1

## 2023-09-20 ENCOUNTER — NURSE ONLY (OUTPATIENT)
Age: 63
End: 2023-09-20

## 2023-09-20 DIAGNOSIS — Z11.59 NEED FOR HEPATITIS C SCREENING TEST: ICD-10-CM

## 2023-09-20 DIAGNOSIS — R73.9 HYPERGLYCEMIA, UNSPECIFIED: ICD-10-CM

## 2023-09-20 DIAGNOSIS — I21.3 ST ELEVATION MYOCARDIAL INFARCTION (STEMI), UNSPECIFIED ARTERY (HCC): ICD-10-CM

## 2023-09-20 DIAGNOSIS — Z72.89 OTHER PROBLEMS RELATED TO LIFESTYLE: ICD-10-CM

## 2023-09-20 DIAGNOSIS — E03.9 ACQUIRED UNDERACTIVE THYROID: ICD-10-CM

## 2023-09-20 DIAGNOSIS — Z13.1 SCREENING FOR DIABETES MELLITUS: ICD-10-CM

## 2023-09-20 DIAGNOSIS — Z11.4 SCREENING FOR HIV WITHOUT PRESENCE OF RISK FACTORS: ICD-10-CM

## 2023-09-21 LAB
ALBUMIN SERPL-MCNC: 4.2 G/DL (ref 3.5–5)
ALBUMIN/GLOB SERPL: 1.6 (ref 1.1–2.2)
ALP SERPL-CCNC: 66 U/L (ref 45–117)
ALT SERPL-CCNC: 29 U/L (ref 12–78)
ANION GAP SERPL CALC-SCNC: 1 MMOL/L (ref 5–15)
AST SERPL-CCNC: 18 U/L (ref 15–37)
BASOPHILS # BLD: 0.1 K/UL (ref 0–0.1)
BASOPHILS NFR BLD: 1 % (ref 0–1)
BILIRUB SERPL-MCNC: 0.7 MG/DL (ref 0.2–1)
BUN SERPL-MCNC: 13 MG/DL (ref 6–20)
BUN/CREAT SERPL: 15 (ref 12–20)
CALCIUM SERPL-MCNC: 9.4 MG/DL (ref 8.5–10.1)
CHLORIDE SERPL-SCNC: 109 MMOL/L (ref 97–108)
CHOLEST SERPL-MCNC: 116 MG/DL
CO2 SERPL-SCNC: 28 MMOL/L (ref 21–32)
CREAT SERPL-MCNC: 0.87 MG/DL (ref 0.7–1.3)
DIFFERENTIAL METHOD BLD: ABNORMAL
EOSINOPHIL # BLD: 0.5 K/UL (ref 0–0.4)
EOSINOPHIL NFR BLD: 9 % (ref 0–7)
ERYTHROCYTE [DISTWIDTH] IN BLOOD BY AUTOMATED COUNT: 12.8 % (ref 11.5–14.5)
EST. AVERAGE GLUCOSE BLD GHB EST-MCNC: 111 MG/DL
GLOBULIN SER CALC-MCNC: 2.6 G/DL (ref 2–4)
GLUCOSE SERPL-MCNC: 114 MG/DL (ref 65–100)
HBA1C MFR BLD: 5.5 % (ref 4–5.6)
HCT VFR BLD AUTO: 39.4 % (ref 36.6–50.3)
HCV AB SER IA-ACNC: 0.08 INDEX
HCV AB SERPL QL IA: NONREACTIVE
HDLC SERPL-MCNC: 59 MG/DL
HDLC SERPL: 2 (ref 0–5)
HGB BLD-MCNC: 12.7 G/DL (ref 12.1–17)
IMM GRANULOCYTES # BLD AUTO: 0 K/UL (ref 0–0.04)
IMM GRANULOCYTES NFR BLD AUTO: 0 % (ref 0–0.5)
LDLC SERPL CALC-MCNC: 46.6 MG/DL (ref 0–100)
LYMPHOCYTES # BLD: 2.2 K/UL (ref 0.8–3.5)
LYMPHOCYTES NFR BLD: 43 % (ref 12–49)
MCH RBC QN AUTO: 29.7 PG (ref 26–34)
MCHC RBC AUTO-ENTMCNC: 32.2 G/DL (ref 30–36.5)
MCV RBC AUTO: 92.1 FL (ref 80–99)
MONOCYTES # BLD: 0.6 K/UL (ref 0–1)
MONOCYTES NFR BLD: 12 % (ref 5–13)
NEUTS SEG # BLD: 1.8 K/UL (ref 1.8–8)
NEUTS SEG NFR BLD: 35 % (ref 32–75)
NRBC # BLD: 0 K/UL (ref 0–0.01)
NRBC BLD-RTO: 0 PER 100 WBC
PLATELET # BLD AUTO: 169 K/UL (ref 150–400)
PMV BLD AUTO: 11 FL (ref 8.9–12.9)
POTASSIUM SERPL-SCNC: 5.2 MMOL/L (ref 3.5–5.1)
PROT SERPL-MCNC: 6.8 G/DL (ref 6.4–8.2)
RBC # BLD AUTO: 4.28 M/UL (ref 4.1–5.7)
SODIUM SERPL-SCNC: 138 MMOL/L (ref 136–145)
T4 FREE SERPL-MCNC: 1.2 NG/DL (ref 0.8–1.5)
TRIGL SERPL-MCNC: 52 MG/DL
TSH SERPL DL<=0.05 MIU/L-ACNC: 0.19 UIU/ML (ref 0.36–3.74)
VLDLC SERPL CALC-MCNC: 10.4 MG/DL
WBC # BLD AUTO: 5.1 K/UL (ref 4.1–11.1)

## 2023-09-22 ENCOUNTER — OFFICE VISIT (OUTPATIENT)
Age: 63
End: 2023-09-22
Payer: MEDICARE

## 2023-09-22 VITALS
HEIGHT: 68 IN | WEIGHT: 151.8 LBS | DIASTOLIC BLOOD PRESSURE: 72 MMHG | RESPIRATION RATE: 17 BRPM | SYSTOLIC BLOOD PRESSURE: 120 MMHG | HEART RATE: 55 BPM | BODY MASS INDEX: 23.01 KG/M2 | OXYGEN SATURATION: 98 % | TEMPERATURE: 98.4 F

## 2023-09-22 DIAGNOSIS — Z00.00 WELCOME TO MEDICARE PREVENTIVE VISIT: ICD-10-CM

## 2023-09-22 DIAGNOSIS — I10 PRIMARY HYPERTENSION: ICD-10-CM

## 2023-09-22 DIAGNOSIS — R11.0 NAUSEA: ICD-10-CM

## 2023-09-22 DIAGNOSIS — E03.9 ACQUIRED HYPOTHYROIDISM: Primary | ICD-10-CM

## 2023-09-22 DIAGNOSIS — J34.89 NASAL DRAINAGE: ICD-10-CM

## 2023-09-22 PROCEDURE — 3017F COLORECTAL CA SCREEN DOC REV: CPT | Performed by: FAMILY MEDICINE

## 2023-09-22 PROCEDURE — 3078F DIAST BP <80 MM HG: CPT | Performed by: FAMILY MEDICINE

## 2023-09-22 PROCEDURE — G0402 INITIAL PREVENTIVE EXAM: HCPCS | Performed by: FAMILY MEDICINE

## 2023-09-22 PROCEDURE — 3074F SYST BP LT 130 MM HG: CPT | Performed by: FAMILY MEDICINE

## 2023-09-22 RX ORDER — ROSUVASTATIN CALCIUM 20 MG/1
20 TABLET, COATED ORAL DAILY
Qty: 90 TABLET | Refills: 1 | Status: SHIPPED | OUTPATIENT
Start: 2023-09-22

## 2023-09-25 DIAGNOSIS — E03.9 ACQUIRED HYPOTHYROIDISM: Primary | ICD-10-CM

## 2023-09-25 RX ORDER — LEVOTHYROXINE SODIUM 88 UG/1
TABLET ORAL
Qty: 30 TABLET | Status: CANCELLED | OUTPATIENT
Start: 2023-09-25

## 2023-09-25 NOTE — PROGRESS NOTES
Medicare Annual Wellness Visit    Eli Restrepo is here for Medicare AWV and Discuss Labs    Assessment & Plan   Acquired hypothyroidism  Primary hypertension  Nausea  Nasal drainage  Welcome to Medicare preventive visit    Recommendations for Preventive Services Due: see orders and patient instructions/AVS.  Recommended screening schedule for the next 5-10 years is provided to the patient in written form: see Patient Instructions/AVS.     No follow-ups on file. Subjective   The following acute and/or chronic problems were also addressed today:  Patient also would like refills of his medications for thyroid, blood pressure, and nasal drainage   We discuss his use of GERD and reflux medications- he states that he was worked up with EGD, no identifiable reason for his reflux. I will need to do a chart review and review of his medical records. We discuss that these medications are not meant for life long use. Including phenergan. Side effects are seen with continued use. Patient's complete Health Risk Assessment and screening values have been reviewed and are found in Flowsheets. The following problems were reviewed today and where indicated follow up appointments were made and/or referrals ordered. Positive Risk Factor Screenings with Interventions:                    Vision Screen:  Do you have difficulty driving, watching TV, or doing any of your daily activities because of your eyesight?: (!) Yes  Have you had an eye exam within the past year?: (!) No  No results found. Interventions:   Patient encouraged to make appointment with their eye specialist  See AVS for additional education material      Advanced Directives:  Do you have a Living Will?: (!) No    Intervention:  has an advanced directive - a copy HAS NOT been provided.                                   Objective   Vitals:    09/22/23 0846   BP: 120/72   Site: Left Upper Arm   Position: Sitting   Cuff Size: Medium Adult   Pulse: 55   Resp: 17

## 2023-09-25 NOTE — PATIENT INSTRUCTIONS
Learning About Vision Tests  What are vision tests? The four most common vision tests are visual acuity tests, refraction, visual field tests, and color vision tests. Visual acuity (sharpness) tests  These tests are used: To see if you need glasses or contact lenses. To monitor an eye problem. To check an eye injury. Visual acuity tests are done as part of routine exams. You may also have this test when you get your 's license or apply for some types of jobs. Visual field tests  These tests are used: To check for vision loss in any area of your range of vision. To screen for certain eye diseases. To look for nerve damage after a stroke, head injury, or other problem that could reduce blood flow to the brain. Refraction and color tests  A refraction test is done to find the right prescription for glasses and contact lenses. A color vision test is done to check for color blindness. Color vision is often tested as part of a routine exam. You may also have this test when you apply for a job where recognizing different colors is important, such as , electronics, or the [employer]. How are vision tests done? Visual acuity test   You cover one eye at a time. You read aloud from a wall chart across the room. You read aloud from a small card that you hold in your hand. Refraction   You look into a special device. The device puts lenses of different strengths in front of each eye to see how strong your glasses or contact lenses need to be. Visual field tests   Your doctor may have you look through special machines. Or your doctor may simply have you stare straight ahead while they move a finger into and out of your field of vision. Color vision test   You look at pieces of printed test patterns in various colors. You say what number or symbol you see. Your doctor may have you trace the number or symbol using a pointer. How do these tests feel?   There is very little chance of

## 2023-09-25 NOTE — TELEPHONE ENCOUNTER
----- Message from Wally Gipson MD sent at 9/25/2023 10:21 AM EDT -----  Patients TSH continues to be low, and potassium is elevated. I would like for him return for a lab appointment to have these rechecked in 2 weeks.

## 2023-09-25 NOTE — TELEPHONE ENCOUNTER
Called patient relayed results patient understood, sent to the front for scheduling for lab only apt

## 2023-09-25 NOTE — TELEPHONE ENCOUNTER
----- Message from Harlee Goldberg, MD sent at 9/25/2023 10:21 AM EDT -----  Patients TSH continues to be low, and potassium is elevated. I would like for him return for a lab appointment to have these rechecked in 2 weeks.

## 2023-10-02 RX ORDER — LEVOTHYROXINE SODIUM 88 UG/1
TABLET ORAL
Qty: 30 TABLET | Status: CANCELLED | OUTPATIENT
Start: 2023-10-02

## 2023-10-02 NOTE — TELEPHONE ENCOUNTER
Pt requesting refill for levothyroxine (SYNTHROID) 88 MCG tablet sent to [address] [place] [address] Drive [phone] - f [phone]    Pt has lab appt on 10/4

## 2023-10-04 ENCOUNTER — NURSE ONLY (OUTPATIENT)
Age: 63
End: 2023-10-04

## 2023-10-04 ENCOUNTER — TELEPHONE (OUTPATIENT)
Age: 63
End: 2023-10-04

## 2023-10-04 DIAGNOSIS — E05.90 HYPERTHYROIDISM: ICD-10-CM

## 2023-10-04 DIAGNOSIS — E03.9 ACQUIRED UNDERACTIVE THYROID: ICD-10-CM

## 2023-10-04 DIAGNOSIS — E03.9 ACQUIRED HYPOTHYROIDISM: ICD-10-CM

## 2023-10-04 DIAGNOSIS — R73.03 PREDIABETES: ICD-10-CM

## 2023-10-04 RX ORDER — LEVOTHYROXINE SODIUM 88 UG/1
88 TABLET ORAL DAILY
Qty: 90 TABLET | Refills: 0 | Status: SHIPPED | OUTPATIENT
Start: 2023-10-04 | End: 2023-10-04

## 2023-10-04 RX ORDER — LEVOTHYROXINE SODIUM 0.03 MG/1
25 TABLET ORAL DAILY
Qty: 7 TABLET | Refills: 0 | Status: SHIPPED | OUTPATIENT
Start: 2023-10-04

## 2023-10-04 NOTE — TELEPHONE ENCOUNTER
Patient is completley out of synthroid, he did labs this morning.  He is wanting med refill sent to [facility] in [place]

## 2023-10-04 NOTE — TELEPHONE ENCOUNTER
[address] called and needs clarification on rx- levothyroxine (SYNTHROID) 25 MCG tablet. Pharmacist wants to know if the quantity (7) is correct.

## 2023-10-05 LAB
ALBUMIN SERPL-MCNC: 4.1 G/DL (ref 3.5–5)
ALBUMIN/GLOB SERPL: 1.5 (ref 1.1–2.2)
ALP SERPL-CCNC: 57 U/L (ref 45–117)
ALT SERPL-CCNC: 27 U/L (ref 12–78)
ANION GAP SERPL CALC-SCNC: 2 MMOL/L (ref 5–15)
AST SERPL-CCNC: 18 U/L (ref 15–37)
BILIRUB SERPL-MCNC: 0.7 MG/DL (ref 0.2–1)
BUN SERPL-MCNC: 14 MG/DL (ref 6–20)
BUN/CREAT SERPL: 19 (ref 12–20)
CALCIUM SERPL-MCNC: 9.4 MG/DL (ref 8.5–10.1)
CHLORIDE SERPL-SCNC: 112 MMOL/L (ref 97–108)
CO2 SERPL-SCNC: 28 MMOL/L (ref 21–32)
CREAT SERPL-MCNC: 0.74 MG/DL (ref 0.7–1.3)
GLOBULIN SER CALC-MCNC: 2.8 G/DL (ref 2–4)
GLUCOSE SERPL-MCNC: 105 MG/DL (ref 65–100)
POTASSIUM SERPL-SCNC: 4.8 MMOL/L (ref 3.5–5.1)
PROT SERPL-MCNC: 6.9 G/DL (ref 6.4–8.2)
SODIUM SERPL-SCNC: 142 MMOL/L (ref 136–145)
T4 FREE SERPL-MCNC: 1.2 NG/DL (ref 0.8–1.5)

## 2023-10-06 LAB
Lab: NORMAL
T3 FREE: 3.4 PG/ML (ref 2–4.4)
T4 FREE SERPL-MCNC: 1.56 NG/DL (ref 0.82–1.77)
TSH SERPL DL<=0.05 MIU/L-ACNC: 0.26 UIU/ML (ref 0.45–4.5)

## 2023-10-13 ENCOUNTER — OFFICE VISIT (OUTPATIENT)
Age: 63
End: 2023-10-13
Payer: MEDICARE

## 2023-10-13 VITALS
HEIGHT: 68 IN | OXYGEN SATURATION: 97 % | SYSTOLIC BLOOD PRESSURE: 110 MMHG | WEIGHT: 158 LBS | BODY MASS INDEX: 23.95 KG/M2 | HEART RATE: 64 BPM | DIASTOLIC BLOOD PRESSURE: 70 MMHG

## 2023-10-13 DIAGNOSIS — I25.10 ATHEROSCLEROSIS OF NATIVE CORONARY ARTERY OF NATIVE HEART WITHOUT ANGINA PECTORIS: Primary | ICD-10-CM

## 2023-10-13 DIAGNOSIS — I10 ESSENTIAL (PRIMARY) HYPERTENSION: ICD-10-CM

## 2023-10-13 DIAGNOSIS — R00.1 BRADYCARDIA, UNSPECIFIED: ICD-10-CM

## 2023-10-13 DIAGNOSIS — E78.5 HYPERLIPIDEMIA, UNSPECIFIED HYPERLIPIDEMIA TYPE: ICD-10-CM

## 2023-10-13 PROCEDURE — 99214 OFFICE O/P EST MOD 30 MIN: CPT | Performed by: INTERNAL MEDICINE

## 2023-10-13 PROCEDURE — 4004F PT TOBACCO SCREEN RCVD TLK: CPT | Performed by: INTERNAL MEDICINE

## 2023-10-13 NOTE — PROGRESS NOTES
Chief Complaint   Patient presents with    Follow-up     Annual      Vitals:    10/13/23 1309   BP: 110/70   Site: Left Upper Arm   Position: Sitting   Pulse: 64   SpO2: 97%   Weight: 158 lb (71.7 kg)   Height: 5' 8\" (1.727 m)         Chest pain denied     SOB denied     Palpitations denied     Swelling in hands/feet denied     Dizziness denied     Recent hospital stays denied     Refills denied
Current Outpatient Medications   Medication Sig Dispense Refill    levothyroxine (SYNTHROID) 25 MCG tablet Take 1 tablet by mouth daily (Patient taking differently: Take 88 mcg by mouth daily) 7 tablet 0    rosuvastatin (CRESTOR) 20 MG tablet Take 1 tablet by mouth daily 90 tablet 1    isosorbide mononitrate (IMDUR) 30 MG extended release tablet Take 1 tablet by mouth daily 90 tablet 1    aspirin 81 MG chewable tablet Take by mouth daily      cetirizine (ZYRTEC) 10 MG tablet Take by mouth daily      etanercept (ENBREL) 25 MG/0.5ML SOSY Inject into the skin every 7 days      leflunomide (ARAVA) 20 MG tablet Take by mouth every evening      lisinopril (PRINIVIL;ZESTRIL) 10 MG tablet Take by mouth daily      metoprolol tartrate (LOPRESSOR) 25 MG tablet TAKE 1/2 (ONE-HALF) TABLET BY MOUTH EVERY 12 HOURS . APPOINTMENT REQUIRED FOR FUTURE REFILLS      promethazine (PHENERGAN) 25 MG tablet every 8 hours as needed       No current facility-administered medications for this visit. Diagnostic Data Review:           1) echocardiogram   (8/16/2021): EF 45 to 50% mild MR      2) cardiac catheterization   (8/14/2021): Acute inferior MI status post primary PCI of proximal RCA with a 3.25 x 15 mm YIMI postdilated to 3.75   There was distal embolization to the PDA and mid posterior lateral.,  Diffuse   mid LAD disease the EDP was 21 mmHg      3) cholesterol   (9/23/2021): , HDL 72, LDL 70,              Lab Review:        No results for input(s): CPK, CKMB, TROIQ in the last 72 hours. No lab exists for component: CKQMB, CPKMB                ___________________________________________________      Everett Adarsh.  Grisel Limon MD, [facility] [place]

## 2023-10-16 ENCOUNTER — TELEPHONE (OUTPATIENT)
Age: 63
End: 2023-10-16

## 2023-10-16 NOTE — TELEPHONE ENCOUNTER
Pt would like to know what to do with his Synthroid medication. Pt said he saw something on mychart but needs clarification.

## 2023-10-27 RX ORDER — LISINOPRIL 10 MG/1
10 TABLET ORAL DAILY
Qty: 30 TABLET | Refills: 0 | Status: SHIPPED | OUTPATIENT
Start: 2023-10-27

## 2023-11-13 NOTE — TELEPHONE ENCOUNTER
Refill per VO of Dr. Flores Hang:    10/13/2023    Future Appointments   Date Time Provider [address]  [address]   12/12/2023  8:15 AM [facility] AMB   10/18/2024  9:00 AM Elaine Leija MD [facility] AMB       Requested Prescriptions     Pending Prescriptions Disp Refills    metoprolol tartrate (LOPRESSOR) 25 MG tablet 90 tablet 3     Sig: TAKE 1/2 (ONE-HALF) TABLET BY MOUTH EVERY 12 HOURS .

## 2023-11-21 RX ORDER — LISINOPRIL 10 MG/1
10 TABLET ORAL DAILY
Qty: 30 TABLET | Refills: 0 | Status: SHIPPED | OUTPATIENT
Start: 2023-11-21

## 2023-12-07 ENCOUNTER — OFFICE VISIT (OUTPATIENT)
Age: 63
DRG: 645 | End: 2023-12-07
Payer: MEDICARE

## 2023-12-07 ENCOUNTER — NURSE ONLY (OUTPATIENT)
Age: 63
End: 2023-12-07

## 2023-12-07 VITALS
RESPIRATION RATE: 17 BRPM | WEIGHT: 161.6 LBS | TEMPERATURE: 97.9 F | HEIGHT: 68 IN | SYSTOLIC BLOOD PRESSURE: 125 MMHG | BODY MASS INDEX: 24.49 KG/M2 | DIASTOLIC BLOOD PRESSURE: 72 MMHG | OXYGEN SATURATION: 98 % | HEART RATE: 55 BPM

## 2023-12-07 DIAGNOSIS — E03.9 ACQUIRED UNDERACTIVE THYROID: ICD-10-CM

## 2023-12-07 DIAGNOSIS — J34.89 TENDERNESS OVER MAXILLARY SINUS: ICD-10-CM

## 2023-12-07 DIAGNOSIS — E05.90 HYPERTHYROIDISM: ICD-10-CM

## 2023-12-07 DIAGNOSIS — J06.9 URI WITH COUGH AND CONGESTION: Primary | ICD-10-CM

## 2023-12-07 DIAGNOSIS — Z00.00 ENCOUNTER FOR WELL ADULT EXAM WITHOUT ABNORMAL FINDINGS: ICD-10-CM

## 2023-12-07 PROCEDURE — 99214 OFFICE O/P EST MOD 30 MIN: CPT | Performed by: FAMILY MEDICINE

## 2023-12-07 PROCEDURE — 3017F COLORECTAL CA SCREEN DOC REV: CPT | Performed by: FAMILY MEDICINE

## 2023-12-07 PROCEDURE — 4004F PT TOBACCO SCREEN RCVD TLK: CPT | Performed by: FAMILY MEDICINE

## 2023-12-07 PROCEDURE — G8484 FLU IMMUNIZE NO ADMIN: HCPCS | Performed by: FAMILY MEDICINE

## 2023-12-07 PROCEDURE — G8428 CUR MEDS NOT DOCUMENT: HCPCS | Performed by: FAMILY MEDICINE

## 2023-12-07 PROCEDURE — G8420 CALC BMI NORM PARAMETERS: HCPCS | Performed by: FAMILY MEDICINE

## 2023-12-07 RX ORDER — LEVOTHYROXINE SODIUM 0.03 MG/1
25 TABLET ORAL DAILY
Qty: 30 TABLET | Refills: 0 | Status: ON HOLD | OUTPATIENT
Start: 2023-12-07 | End: 2023-12-09 | Stop reason: HOSPADM

## 2023-12-07 RX ORDER — LEVOTHYROXINE SODIUM 88 UG/1
88 TABLET ORAL DAILY
COMMUNITY
Start: 2023-10-04 | End: 2023-12-07

## 2023-12-08 ENCOUNTER — APPOINTMENT (OUTPATIENT)
Facility: HOSPITAL | Age: 63
DRG: 645 | End: 2023-12-08
Payer: MEDICARE

## 2023-12-08 ENCOUNTER — HOSPITAL ENCOUNTER (INPATIENT)
Facility: HOSPITAL | Age: 63
LOS: 1 days | Discharge: HOME | DRG: 645 | End: 2023-12-09
Attending: STUDENT IN AN ORGANIZED HEALTH CARE EDUCATION/TRAINING PROGRAM | Admitting: FAMILY MEDICINE
Payer: MEDICARE

## 2023-12-08 ENCOUNTER — TELEPHONE (OUTPATIENT)
Age: 63
End: 2023-12-08

## 2023-12-08 DIAGNOSIS — R74.8 ELEVATED CK: ICD-10-CM

## 2023-12-08 DIAGNOSIS — E03.9 HYPOTHYROIDISM, UNSPECIFIED TYPE: Primary | ICD-10-CM

## 2023-12-08 DIAGNOSIS — R79.89 ABNORMAL LFTS: ICD-10-CM

## 2023-12-08 LAB
ALBUMIN SERPL-MCNC: 4 G/DL (ref 3.5–5)
ALBUMIN/GLOB SERPL: 1.4 (ref 1.1–2.2)
ALP SERPL-CCNC: 48 U/L (ref 45–117)
ALT SERPL-CCNC: 175 U/L (ref 12–78)
ANION GAP SERPL CALC-SCNC: 2 MMOL/L (ref 5–15)
AST SERPL-CCNC: 101 U/L (ref 15–37)
BASOPHILS # BLD: 0.1 K/UL (ref 0–0.1)
BASOPHILS NFR BLD: 1 % (ref 0–1)
BILIRUB SERPL-MCNC: 0.5 MG/DL (ref 0.2–1)
BUN SERPL-MCNC: 13 MG/DL (ref 6–20)
BUN/CREAT SERPL: 12 (ref 12–20)
CALCIUM SERPL-MCNC: 8.9 MG/DL (ref 8.5–10.1)
CHLORIDE SERPL-SCNC: 109 MMOL/L (ref 97–108)
CK SERPL-CCNC: 537 U/L (ref 39–308)
CK SERPL-CCNC: 617 U/L (ref 39–308)
CO2 SERPL-SCNC: 28 MMOL/L (ref 21–32)
CORTIS SERPL-MCNC: 4.5 UG/DL
CREAT SERPL-MCNC: 1.05 MG/DL (ref 0.7–1.3)
DIFFERENTIAL METHOD BLD: ABNORMAL
EOSINOPHIL # BLD: 0.5 K/UL (ref 0–0.4)
EOSINOPHIL NFR BLD: 8 % (ref 0–7)
ERYTHROCYTE [DISTWIDTH] IN BLOOD BY AUTOMATED COUNT: 14.1 % (ref 11.5–14.5)
GLOBULIN SER CALC-MCNC: 2.9 G/DL (ref 2–4)
GLUCOSE SERPL-MCNC: 90 MG/DL (ref 65–100)
HCT VFR BLD AUTO: 36.3 % (ref 36.6–50.3)
HGB BLD-MCNC: 12.2 G/DL (ref 12.1–17)
IMM GRANULOCYTES # BLD AUTO: 0 K/UL (ref 0–0.04)
IMM GRANULOCYTES NFR BLD AUTO: 0 % (ref 0–0.5)
LYMPHOCYTES # BLD: 2.8 K/UL (ref 0.8–3.5)
LYMPHOCYTES NFR BLD: 49 % (ref 12–49)
MCH RBC QN AUTO: 31.2 PG (ref 26–34)
MCHC RBC AUTO-ENTMCNC: 33.6 G/DL (ref 30–36.5)
MCV RBC AUTO: 92.8 FL (ref 80–99)
MONOCYTES # BLD: 0.5 K/UL (ref 0–1)
MONOCYTES NFR BLD: 8 % (ref 5–13)
NEUTS SEG # BLD: 1.9 K/UL (ref 1.8–8)
NEUTS SEG NFR BLD: 34 % (ref 32–75)
NRBC # BLD: 0 K/UL (ref 0–0.01)
NRBC BLD-RTO: 0 PER 100 WBC
PLATELET # BLD AUTO: 140 K/UL (ref 150–400)
PMV BLD AUTO: 10.6 FL (ref 8.9–12.9)
POTASSIUM SERPL-SCNC: 3.8 MMOL/L (ref 3.5–5.1)
PROT SERPL-MCNC: 6.9 G/DL (ref 6.4–8.2)
RBC # BLD AUTO: 3.91 M/UL (ref 4.1–5.7)
SODIUM SERPL-SCNC: 139 MMOL/L (ref 136–145)
T3FREE SERPL-MCNC: <0.5 PG/ML (ref 2.2–4)
T4 FREE SERPL-MCNC: 0.2 NG/DL (ref 0.8–1.5)
T4 FREE SERPL-MCNC: 0.2 NG/DL (ref 0.8–1.5)
TSH SERPL DL<=0.05 MIU/L-ACNC: >100 UIU/ML (ref 0.36–3.74)
TSH SERPL DL<=0.05 MIU/L-ACNC: >100 UIU/ML (ref 0.36–3.74)
WBC # BLD AUTO: 5.6 K/UL (ref 4.1–11.1)

## 2023-12-08 PROCEDURE — 2580000003 HC RX 258

## 2023-12-08 PROCEDURE — 85025 COMPLETE CBC W/AUTO DIFF WBC: CPT

## 2023-12-08 PROCEDURE — 99285 EMERGENCY DEPT VISIT HI MDM: CPT

## 2023-12-08 PROCEDURE — 6370000000 HC RX 637 (ALT 250 FOR IP)

## 2023-12-08 PROCEDURE — 76536 US EXAM OF HEAD AND NECK: CPT

## 2023-12-08 PROCEDURE — 1100000000 HC RM PRIVATE

## 2023-12-08 PROCEDURE — 2580000003 HC RX 258: Performed by: STUDENT IN AN ORGANIZED HEALTH CARE EDUCATION/TRAINING PROGRAM

## 2023-12-08 PROCEDURE — 36415 COLL VENOUS BLD VENIPUNCTURE: CPT

## 2023-12-08 PROCEDURE — 96360 HYDRATION IV INFUSION INIT: CPT

## 2023-12-08 PROCEDURE — 84439 ASSAY OF FREE THYROXINE: CPT

## 2023-12-08 PROCEDURE — 82533 TOTAL CORTISOL: CPT

## 2023-12-08 PROCEDURE — 84481 FREE ASSAY (FT-3): CPT

## 2023-12-08 PROCEDURE — 80053 COMPREHEN METABOLIC PANEL: CPT

## 2023-12-08 PROCEDURE — 84443 ASSAY THYROID STIM HORMONE: CPT

## 2023-12-08 PROCEDURE — 82550 ASSAY OF CK (CPK): CPT

## 2023-12-08 RX ORDER — ENOXAPARIN SODIUM 100 MG/ML
40 INJECTION SUBCUTANEOUS DAILY
Status: DISCONTINUED | OUTPATIENT
Start: 2023-12-09 | End: 2023-12-09 | Stop reason: HOSPADM

## 2023-12-08 RX ORDER — 0.9 % SODIUM CHLORIDE 0.9 %
1000 INTRAVENOUS SOLUTION INTRAVENOUS ONCE
Status: COMPLETED | OUTPATIENT
Start: 2023-12-08 | End: 2023-12-08

## 2023-12-08 RX ORDER — POLYETHYLENE GLYCOL 3350 17 G/17G
17 POWDER, FOR SOLUTION ORAL DAILY PRN
Status: DISCONTINUED | OUTPATIENT
Start: 2023-12-08 | End: 2023-12-09 | Stop reason: HOSPADM

## 2023-12-08 RX ORDER — SODIUM CHLORIDE 0.9 % (FLUSH) 0.9 %
5-40 SYRINGE (ML) INJECTION PRN
Status: DISCONTINUED | OUTPATIENT
Start: 2023-12-08 | End: 2023-12-09 | Stop reason: HOSPADM

## 2023-12-08 RX ORDER — ONDANSETRON 2 MG/ML
4 INJECTION INTRAMUSCULAR; INTRAVENOUS EVERY 6 HOURS PRN
Status: DISCONTINUED | OUTPATIENT
Start: 2023-12-08 | End: 2023-12-09 | Stop reason: HOSPADM

## 2023-12-08 RX ORDER — CETIRIZINE HYDROCHLORIDE 10 MG/1
10 TABLET ORAL DAILY
Status: DISCONTINUED | OUTPATIENT
Start: 2023-12-09 | End: 2023-12-09

## 2023-12-08 RX ORDER — ASPIRIN 81 MG/1
81 TABLET, CHEWABLE ORAL DAILY
Status: DISCONTINUED | OUTPATIENT
Start: 2023-12-09 | End: 2023-12-09 | Stop reason: HOSPADM

## 2023-12-08 RX ORDER — ACETAMINOPHEN 650 MG/1
650 SUPPOSITORY RECTAL EVERY 6 HOURS PRN
Status: DISCONTINUED | OUTPATIENT
Start: 2023-12-08 | End: 2023-12-09 | Stop reason: HOSPADM

## 2023-12-08 RX ORDER — POTASSIUM CHLORIDE 750 MG/1
40 TABLET, FILM COATED, EXTENDED RELEASE ORAL PRN
Status: DISCONTINUED | OUTPATIENT
Start: 2023-12-08 | End: 2023-12-09 | Stop reason: HOSPADM

## 2023-12-08 RX ORDER — MAGNESIUM SULFATE IN WATER 40 MG/ML
2000 INJECTION, SOLUTION INTRAVENOUS PRN
Status: DISCONTINUED | OUTPATIENT
Start: 2023-12-08 | End: 2023-12-09 | Stop reason: HOSPADM

## 2023-12-08 RX ORDER — LEVOTHYROXINE SODIUM 88 UG/1
88 TABLET ORAL DAILY
Status: DISCONTINUED | OUTPATIENT
Start: 2023-12-08 | End: 2023-12-09 | Stop reason: HOSPADM

## 2023-12-08 RX ORDER — SODIUM CHLORIDE 0.9 % (FLUSH) 0.9 %
5-40 SYRINGE (ML) INJECTION EVERY 12 HOURS SCHEDULED
Status: DISCONTINUED | OUTPATIENT
Start: 2023-12-08 | End: 2023-12-09 | Stop reason: HOSPADM

## 2023-12-08 RX ORDER — SODIUM CHLORIDE 9 MG/ML
INJECTION, SOLUTION INTRAVENOUS PRN
Status: DISCONTINUED | OUTPATIENT
Start: 2023-12-08 | End: 2023-12-09 | Stop reason: HOSPADM

## 2023-12-08 RX ORDER — LISINOPRIL 5 MG/1
10 TABLET ORAL DAILY
Status: DISCONTINUED | OUTPATIENT
Start: 2023-12-09 | End: 2023-12-09 | Stop reason: HOSPADM

## 2023-12-08 RX ORDER — LEFLUNOMIDE 10 MG/1
20 TABLET ORAL EVERY EVENING
Status: DISCONTINUED | OUTPATIENT
Start: 2023-12-08 | End: 2023-12-09 | Stop reason: HOSPADM

## 2023-12-08 RX ORDER — POTASSIUM CHLORIDE 7.45 MG/ML
10 INJECTION INTRAVENOUS PRN
Status: DISCONTINUED | OUTPATIENT
Start: 2023-12-08 | End: 2023-12-09 | Stop reason: HOSPADM

## 2023-12-08 RX ORDER — ISOSORBIDE MONONITRATE 30 MG/1
30 TABLET, EXTENDED RELEASE ORAL DAILY
Status: DISCONTINUED | OUTPATIENT
Start: 2023-12-09 | End: 2023-12-09

## 2023-12-08 RX ORDER — ACETAMINOPHEN 325 MG/1
650 TABLET ORAL EVERY 6 HOURS PRN
Status: DISCONTINUED | OUTPATIENT
Start: 2023-12-08 | End: 2023-12-09 | Stop reason: HOSPADM

## 2023-12-08 RX ORDER — ROSUVASTATIN CALCIUM 10 MG/1
20 TABLET, COATED ORAL DAILY
Status: DISCONTINUED | OUTPATIENT
Start: 2023-12-09 | End: 2023-12-09

## 2023-12-08 RX ORDER — ONDANSETRON 4 MG/1
4 TABLET, ORALLY DISINTEGRATING ORAL EVERY 8 HOURS PRN
Status: DISCONTINUED | OUTPATIENT
Start: 2023-12-08 | End: 2023-12-09 | Stop reason: HOSPADM

## 2023-12-08 RX ADMIN — SODIUM CHLORIDE 1000 ML: 9 INJECTION, SOLUTION INTRAVENOUS at 19:02

## 2023-12-08 RX ADMIN — SODIUM CHLORIDE 1000 ML: 9 INJECTION, SOLUTION INTRAVENOUS at 15:11

## 2023-12-08 RX ADMIN — SODIUM CHLORIDE, PRESERVATIVE FREE 10 ML: 5 INJECTION INTRAVENOUS at 21:45

## 2023-12-08 RX ADMIN — METOPROLOL TARTRATE 12.5 MG: 25 TABLET, FILM COATED ORAL at 21:14

## 2023-12-08 RX ADMIN — LEFLUNOMIDE 20 MG: 10 TABLET ORAL at 21:44

## 2023-12-08 RX ADMIN — LEVOTHYROXINE SODIUM 88 MCG: 0.09 TABLET ORAL at 18:57

## 2023-12-08 ASSESSMENT — ENCOUNTER SYMPTOMS
SHORTNESS OF BREATH: 0
FACIAL SWELLING: 1

## 2023-12-08 ASSESSMENT — PAIN - FUNCTIONAL ASSESSMENT: PAIN_FUNCTIONAL_ASSESSMENT: NONE - DENIES PAIN

## 2023-12-08 NOTE — TELEPHONE ENCOUNTER
Called patient and advised to go to ER for repeat lab work, patient advised he understood and would go to [facility]

## 2023-12-08 NOTE — ED NOTES
TRANSFER - OUT REPORT:    Verbal report given to [facility] on Jonelle Taveras  being transferred to 5th floor for routine progression of patient care       Report consisted of patient's Situation, Background, Assessment and   Recommendations(SBAR). Information from the following report(s) ED Encounter Summary, ED SBAR, Intake/Output, MAR, and Recent Results was reviewed with the receiving nurse. [place] Assessment:    Presents to emergency department  because of falls (Syncope, seizure, or loss of consciousness): No  Age > 70: No  Altered Mental Status, Intoxication with alcohol or substance confusion (Disorientation, impaired judgment, poor safety awaremess, or inability to follow instructions): No  Impaired Mobility: Ambulates or transfers with assistive devices or assistance; Unable to ambulate or transer.: No  Nursing Judgement: No          Lines:   Peripheral IV 12/08/23 Right Antecubital (Active)        Opportunity for questions and clarification was provided.       Patient transported with:  Joselin Medina, [address]  12/08/23 3882

## 2023-12-08 NOTE — ED PROVIDER NOTES
[facility] EMERGENCY DEPT  EMERGENCY DEPARTMENT ENCOUNTER      Pt Name: Bharti Salomon  MRN: [[id number]]  [id number] [place] 1960  Date of evaluation: 12/8/2023  Provider: Radha Hester       Chief Complaint   Patient presents with    abnormal lab results         HISTORY OF PRESENT ILLNESS   (Location/Symptom, Timing/Onset, Context/Setting, Quality, Duration, Modifying Factors, Severity)  Note limiting factors. 57-year-old male prior history of hypothyroidism, RA, OA,  coronary disease presenting today with abnormal labs. Patient was on Synthroid until several weeks ago when he was taken off of it due to a very low TSH. He had his repeat TSH yesterday and it was markedly elevated therefore his PCP sent him in for further evaluation. His only symptom is periorbital edema. He denies chest pain, shortness of breath, extremity edema fevers chills or any other complaints. Review of External Medical Records:     Nursing Notes were reviewed. REVIEW OF SYSTEMS    (2-9 systems for level 4, 10 or more for level 5)     Review of Systems   HENT:  Positive for facial swelling. Respiratory:  Negative for shortness of breath. Cardiovascular:  Negative for chest pain. Except as noted above the remainder of the review of systems was reviewed and negative.        PAST MEDICAL HISTORY     Past Medical History:   Diagnosis Date    GERD (gastroesophageal reflux disease)     Heart attack ([address]) 08/2021    Hypothyroidism 05/1992    Osteoarthritis 03/2021    Rheumatoid arthritis ([address])     Thyroid disease          SURGICAL HISTORY       Past Surgical History:   Procedure Laterality Date    ORTHOPEDIC SURGERY      Knee scope x2, CTR x2         CURRENT MEDICATIONS       Previous Medications    ASPIRIN 81 MG CHEWABLE TABLET    Take by mouth daily    CETIRIZINE (ZYRTEC) 10 MG TABLET    Take by mouth daily    ETANERCEPT (ENBREL) 25 MG/0.5ML SOSY    Inject into the skin every 7 days

## 2023-12-08 NOTE — ED TRIAGE NOTES
Patient reports he was referred here by PCP for repeat blood work stating \"I had my thyroid checked and they said my levels were over 100 when they should have been 3.7\". Reports some \"puffiness\" around his eyes. He states his doctor took him off of synthroid 8 weeks ago. Denies other symptoms or complaints.

## 2023-12-08 NOTE — TELEPHONE ENCOUNTER
Pt said he has received his lab results on [place] already and his numbers are not looking good. He would like to go over his results with the nurse. I told him as soon as Dr. Mitchell Rolling results the labs [place] will call him to go over the results. Pt understood.

## 2023-12-09 VITALS
SYSTOLIC BLOOD PRESSURE: 167 MMHG | OXYGEN SATURATION: 99 % | WEIGHT: 161 LBS | DIASTOLIC BLOOD PRESSURE: 94 MMHG | TEMPERATURE: 97.7 F | BODY MASS INDEX: 24.4 KG/M2 | HEIGHT: 68 IN | RESPIRATION RATE: 16 BRPM | HEART RATE: 51 BPM

## 2023-12-09 DIAGNOSIS — E03.9 ACQUIRED HYPOTHYROIDISM: Primary | ICD-10-CM

## 2023-12-09 LAB
ALBUMIN SERPL-MCNC: 3.2 G/DL (ref 3.5–5)
ALBUMIN/GLOB SERPL: 1.3 (ref 1.1–2.2)
ALP SERPL-CCNC: 41 U/L (ref 45–117)
ALT SERPL-CCNC: 162 U/L (ref 12–78)
ANION GAP SERPL CALC-SCNC: 4 MMOL/L (ref 5–15)
AST SERPL-CCNC: 99 U/L (ref 15–37)
BASOPHILS # BLD: 0.1 K/UL (ref 0–0.1)
BASOPHILS NFR BLD: 1 % (ref 0–1)
BILIRUB SERPL-MCNC: 0.5 MG/DL (ref 0.2–1)
BUN SERPL-MCNC: 11 MG/DL (ref 6–20)
BUN/CREAT SERPL: 13 (ref 12–20)
CALCIUM SERPL-MCNC: 7.9 MG/DL (ref 8.5–10.1)
CHLORIDE SERPL-SCNC: 112 MMOL/L (ref 97–108)
CK SERPL-CCNC: 485 U/L (ref 39–308)
CO2 SERPL-SCNC: 24 MMOL/L (ref 21–32)
CORTIS AM PEAK SERPL-MCNC: 17.5 UG/DL (ref 4.3–22.45)
CREAT SERPL-MCNC: 0.83 MG/DL (ref 0.7–1.3)
DIFFERENTIAL METHOD BLD: ABNORMAL
EOSINOPHIL # BLD: 0.5 K/UL (ref 0–0.4)
EOSINOPHIL NFR BLD: 11 % (ref 0–7)
ERYTHROCYTE [DISTWIDTH] IN BLOOD BY AUTOMATED COUNT: 14.1 % (ref 11.5–14.5)
GLOBULIN SER CALC-MCNC: 2.4 G/DL (ref 2–4)
GLUCOSE SERPL-MCNC: 93 MG/DL (ref 65–100)
HCT VFR BLD AUTO: 35.5 % (ref 36.6–50.3)
HGB BLD-MCNC: 11.4 G/DL (ref 12.1–17)
IMM GRANULOCYTES # BLD AUTO: 0 K/UL (ref 0–0.04)
IMM GRANULOCYTES NFR BLD AUTO: 0 % (ref 0–0.5)
LYMPHOCYTES # BLD: 2.4 K/UL (ref 0.8–3.5)
LYMPHOCYTES NFR BLD: 51 % (ref 12–49)
MCH RBC QN AUTO: 30.2 PG (ref 26–34)
MCHC RBC AUTO-ENTMCNC: 32.1 G/DL (ref 30–36.5)
MCV RBC AUTO: 93.9 FL (ref 80–99)
MONOCYTES # BLD: 0.4 K/UL (ref 0–1)
MONOCYTES NFR BLD: 8 % (ref 5–13)
NEUTS SEG # BLD: 1.3 K/UL (ref 1.8–8)
NEUTS SEG NFR BLD: 29 % (ref 32–75)
NRBC # BLD: 0 K/UL (ref 0–0.01)
NRBC BLD-RTO: 0 PER 100 WBC
PLATELET # BLD AUTO: 107 K/UL (ref 150–400)
PMV BLD AUTO: 10.5 FL (ref 8.9–12.9)
POTASSIUM SERPL-SCNC: 3.8 MMOL/L (ref 3.5–5.1)
PROT SERPL-MCNC: 5.6 G/DL (ref 6.4–8.2)
RBC # BLD AUTO: 3.78 M/UL (ref 4.1–5.7)
SODIUM SERPL-SCNC: 140 MMOL/L (ref 136–145)
TSH SERPL DL<=0.05 MIU/L-ACNC: 166 UIU/ML (ref 0.45–4.5)
WBC # BLD AUTO: 4.7 K/UL (ref 4.1–11.1)

## 2023-12-09 PROCEDURE — 82533 TOTAL CORTISOL: CPT

## 2023-12-09 PROCEDURE — 80053 COMPREHEN METABOLIC PANEL: CPT

## 2023-12-09 PROCEDURE — 36415 COLL VENOUS BLD VENIPUNCTURE: CPT

## 2023-12-09 PROCEDURE — 94761 N-INVAS EAR/PLS OXIMETRY MLT: CPT

## 2023-12-09 PROCEDURE — 99235 HOSP IP/OBS SAME DATE MOD 70: CPT | Performed by: FAMILY MEDICINE

## 2023-12-09 PROCEDURE — 2580000003 HC RX 258

## 2023-12-09 PROCEDURE — 86376 MICROSOMAL ANTIBODY EACH: CPT

## 2023-12-09 PROCEDURE — 6370000000 HC RX 637 (ALT 250 FOR IP)

## 2023-12-09 PROCEDURE — 82550 ASSAY OF CK (CPK): CPT

## 2023-12-09 PROCEDURE — 6360000002 HC RX W HCPCS

## 2023-12-09 PROCEDURE — 85025 COMPLETE CBC W/AUTO DIFF WBC: CPT

## 2023-12-09 RX ORDER — LEVOTHYROXINE SODIUM 88 UG/1
88 TABLET ORAL DAILY
Qty: 90 TABLET | Refills: 2 | Status: SHIPPED | OUTPATIENT
Start: 2023-12-10

## 2023-12-09 RX ORDER — CETIRIZINE HYDROCHLORIDE 10 MG/1
10 TABLET ORAL NIGHTLY
Status: DISCONTINUED | OUTPATIENT
Start: 2023-12-09 | End: 2023-12-09 | Stop reason: HOSPADM

## 2023-12-09 RX ORDER — ISOSORBIDE MONONITRATE 30 MG/1
30 TABLET, EXTENDED RELEASE ORAL EVERY EVENING
Status: DISCONTINUED | OUTPATIENT
Start: 2023-12-09 | End: 2023-12-09 | Stop reason: HOSPADM

## 2023-12-09 RX ORDER — ROSUVASTATIN CALCIUM 10 MG/1
20 TABLET, COATED ORAL NIGHTLY
Status: DISCONTINUED | OUTPATIENT
Start: 2023-12-09 | End: 2023-12-09 | Stop reason: HOSPADM

## 2023-12-09 RX ADMIN — ASPIRIN 81 MG: 81 TABLET, CHEWABLE ORAL at 08:27

## 2023-12-09 RX ADMIN — ENOXAPARIN SODIUM 40 MG: 100 INJECTION SUBCUTANEOUS at 08:31

## 2023-12-09 RX ADMIN — LEVOTHYROXINE SODIUM 88 MCG: 0.09 TABLET ORAL at 06:23

## 2023-12-09 RX ADMIN — SODIUM CHLORIDE, PRESERVATIVE FREE 10 ML: 5 INJECTION INTRAVENOUS at 08:30

## 2023-12-09 RX ADMIN — METOPROLOL TARTRATE 12.5 MG: 25 TABLET, FILM COATED ORAL at 08:27

## 2023-12-09 RX ADMIN — LISINOPRIL 10 MG: 5 TABLET ORAL at 08:27

## 2023-12-09 NOTE — DISCHARGE INSTRUCTIONS
HOME DISCHARGE INSTRUCTIONS    Tomeka Grave / [de-identified] : 1960    Admission date: 2023 Discharge date: 2023     Please bring this form with you to show your care provider at your follow-up appointment. Primary care provider:  Cristian Denton    Discharging provider:  Gloria Sebastian MD  - Family Medicine Resident  Dr. Te Velasco. Attending, Family Medicine     You have been admitted to the hospital with the following diagnoses:    ACUTE DIAGNOSES:  Hypothyroidism [E03.9]  Elevated CK [R74.8]  Abnormal LFTs [R79.89]  Hypothyroidism, unspecified type [E03.9]    . . . . . . . . . . . . . . . . . . . . . . . . . . . . . . . . . . . . . . . . . . . . . . . . . . . . . . . . . . . . . . . . . . . . . . . .   Attending physician at discharge/transfer:     FOLLOW-UP CARE RECOMMENDATIONS:  You are well enough to be discharged from the hospital. You were in the hospital for severe hypothyroidism, due to discontinuing your thyroid medication. Your cortisol level was normal. Please continue taking your tyroid medication. Appointments  Cristian Denton, [facility] [place] [phone]    Call to make an appointment if you are not receiving a phone call from your provider by Tuesday. You will need a follow up appointment after your hospital visit. Future Appointments   Date Time Provider [address] [place] [address]   10/18/2024  9:00 AM Wally Leija MD [facility] BS AMB         Please follow up with your PCP regarding:  - Thyroid function  - TPO antibody test    Follow-up tests needed: TSH    Pending test results: At the time of your discharge the following test results are still pending: TPO antiboidy. Please make sure you review these results with your outpatient follow-up provider(s). Specific symptoms to watch for: chest pain, shortness of breath, fever, chills, nausea, vomiting, diarrhea, change in mentation, falling, weakness, bleeding.      DIET/what to Date/Time

## 2023-12-09 NOTE — DISCHARGE SUMMARY
Discharge / Transfer / Off-Service Note     Name: Charmaine Lawrence MRN: [de-identified]  Sex: Male   YOB: 1960  Age: 61 y.o. PCP: Grace Olivas MD     Date of admission: 12/8/2023  Date of discharge/transfer: 12/9/2023    Attending physician at admission: Dr. Alhaji Moore. Attending physician at discharge/transfer: No att. providers found  Resident physician at discharge/transfer: Bettina Litten, MD     Consultants during hospitalization  IP CONSULT TO [facility] PROVIDER     Admission diagnoses   Hypothyroidism [E03.9]  Elevated CK [R74.8]  Abnormal LFTs [R79.89]  Hypothyroidism, unspecified type [E03.9]    Recommended follow-up after discharge    1. PCP-Kristopher Torrez MD     Things to follow up on with PCP:   - TSH recheck. - Follow up on TPO Ab. Medication Changes:     Medication List        CHANGE how you take these medications      levothyroxine 88 MCG tablet  Commonly known as: SYNTHROID  Take 1 tablet by mouth Daily  Start taking on: December 10, 2023  What changed:   medication strength  how much to take  when to take this            CONTINUE taking these medications      aspirin 81 MG chewable tablet     cetirizine 10 MG tablet  Commonly known as: ZYRTEC     Enbrel 25 MG/0.5ML Sosy  Generic drug: etanercept     isosorbide mononitrate 30 MG extended release tablet  Commonly known as: IMDUR  Take 1 tablet by mouth daily     leflunomide 20 MG tablet  Commonly known as: ARAVA     lisinopril 10 MG tablet  Commonly known as: PRINIVIL;ZESTRIL  Take 1 tablet by mouth once daily     metoprolol tartrate 25 MG tablet  Commonly known as: LOPRESSOR  TAKE 1/2 (ONE-HALF) TABLET BY MOUTH EVERY 12 HOURS .      rosuvastatin 20 MG tablet  Commonly known as: CRESTOR  Take 1 tablet by mouth daily            ASK your doctor about these medications      promethazine 25 MG tablet  Commonly known as: PHENERGAN               Where to Get Your Medications        These medications were sent to [facility]

## 2023-12-09 NOTE — PLAN OF CARE
Problem: Safety - Adult  Goal: Safe ambulation  12/8/2023 2349 by Chantal Horton RN  Outcome: Progressing  12/8/2023 2349 by Chantal Horton RN  Outcome: Progressing     Problem: Pain  Goal: Pain control  Description: Patient will demonstrate personal actions to control pain.   Outcome: Progressing

## 2023-12-11 LAB — THYROPEROXIDASE AB SERPL-ACNC: 13 IU/ML (ref 0–34)

## 2023-12-11 ASSESSMENT — ENCOUNTER SYMPTOMS
COUGH: 0
WHEEZING: 1
CHEST TIGHTNESS: 0
SHORTNESS OF BREATH: 1

## 2023-12-13 RX ORDER — ISOSORBIDE MONONITRATE 30 MG/1
30 TABLET, EXTENDED RELEASE ORAL DAILY
Qty: 90 TABLET | Refills: 3 | Status: SHIPPED | OUTPATIENT
Start: 2023-12-13

## 2023-12-13 NOTE — TELEPHONE ENCOUNTER
Refill per VO of Dr. Vasquez Royal:    10/13/2023    Future Appointments   Date Time Provider [address]   12/21/2023  9:30 AM Karen Small MD [facility] AMB   10/18/2024  9:00 AM Estevan Leija MD [facility] AMB       Requested Prescriptions     Pending Prescriptions Disp Refills    isosorbide mononitrate (IMDUR) 30 MG extended release tablet [Pharmacy Med Name: Isosorbide Mononitrate ER 30 MG Oral Tablet Extended Release 24 Hour] 90 tablet 0     Sig: Take 1 tablet by mouth once daily

## 2023-12-28 LAB
T4 FREE SERPL-MCNC: NORMAL NG/DL
TSH SERPL DL<=0.05 MIU/L-ACNC: 166 UIU/ML (ref 0.45–4.5)

## 2024-10-24 NOTE — Clinical Note
Multiple views of the right coronary artery obtained using hand injection. Render Post-Care Instructions In Note?: yes Render Note In Bullet Format When Appropriate: No Detail Level: Detailed Number Of Freeze-Thaw Cycles: 1 freeze-thaw cycle Consent: The patient's consent was obtained including but not limited to risks of crusting, scabbing, blistering, scarring, darker or lighter pigmentary change, recurrence, incomplete removal and infection. Duration Of Freeze Thaw-Cycle (Seconds): 0 Post-Care Instructions: I reviewed with the patient in detail post-care instructions. Patient is to wear sunprotection, and avoid picking at any of the treated lesions. Pt may apply Vaseline to crusted or scabbing areas.

## 2024-11-04 ENCOUNTER — OFFICE VISIT (OUTPATIENT)
Age: 64
End: 2024-11-04
Payer: MEDICARE

## 2024-11-04 VITALS
OXYGEN SATURATION: 93 % | WEIGHT: 157 LBS | HEART RATE: 60 BPM | HEIGHT: 68 IN | RESPIRATION RATE: 18 BRPM | SYSTOLIC BLOOD PRESSURE: 128 MMHG | DIASTOLIC BLOOD PRESSURE: 78 MMHG | BODY MASS INDEX: 23.79 KG/M2

## 2024-11-04 DIAGNOSIS — R00.1 BRADYCARDIA, UNSPECIFIED: ICD-10-CM

## 2024-11-04 DIAGNOSIS — I25.10 ATHEROSCLEROSIS OF NATIVE CORONARY ARTERY OF NATIVE HEART WITHOUT ANGINA PECTORIS: Primary | ICD-10-CM

## 2024-11-04 DIAGNOSIS — I21.3 ST ELEVATION MYOCARDIAL INFARCTION (STEMI), UNSPECIFIED ARTERY (HCC): ICD-10-CM

## 2024-11-04 DIAGNOSIS — E78.5 HYPERLIPIDEMIA, UNSPECIFIED HYPERLIPIDEMIA TYPE: ICD-10-CM

## 2024-11-04 DIAGNOSIS — M06.9 RHEUMATOID ARTHRITIS, INVOLVING UNSPECIFIED SITE, UNSPECIFIED WHETHER RHEUMATOID FACTOR PRESENT (HCC): ICD-10-CM

## 2024-11-04 DIAGNOSIS — I10 ESSENTIAL (PRIMARY) HYPERTENSION: ICD-10-CM

## 2024-11-04 DIAGNOSIS — R53.83 OTHER FATIGUE: ICD-10-CM

## 2024-11-04 PROCEDURE — 93010 ELECTROCARDIOGRAM REPORT: CPT

## 2024-11-04 PROCEDURE — 99214 OFFICE O/P EST MOD 30 MIN: CPT

## 2024-11-04 PROCEDURE — 1036F TOBACCO NON-USER: CPT

## 2024-11-04 PROCEDURE — 3017F COLORECTAL CA SCREEN DOC REV: CPT

## 2024-11-04 PROCEDURE — G8484 FLU IMMUNIZE NO ADMIN: HCPCS

## 2024-11-04 PROCEDURE — 93005 ELECTROCARDIOGRAM TRACING: CPT

## 2024-11-04 PROCEDURE — 3074F SYST BP LT 130 MM HG: CPT

## 2024-11-04 PROCEDURE — G8420 CALC BMI NORM PARAMETERS: HCPCS

## 2024-11-04 PROCEDURE — G8427 DOCREV CUR MEDS BY ELIG CLIN: HCPCS

## 2024-11-04 PROCEDURE — 3078F DIAST BP <80 MM HG: CPT

## 2024-11-04 RX ORDER — METOPROLOL SUCCINATE 25 MG/1
25 TABLET, EXTENDED RELEASE ORAL DAILY
Qty: 90 TABLET | Refills: 3 | Status: SHIPPED | OUTPATIENT
Start: 2024-11-04

## 2024-11-04 NOTE — PATIENT INSTRUCTIONS
Please change your metoprolol tartrate 12.5mg twice a day to metoprolol succinate/Toprol XL 25mg AT NIGHT    My chart me later in the week to let me know if this has improved or worsened your fatigue. We can always try a half tablet of the long acting.

## 2024-11-04 NOTE — PROGRESS NOTES
had concerns including Coronary Artery Disease, Hypertension, and Cholesterol Problem.    Vitals:    11/04/24 1416   BP: 128/78   Site: Left Upper Arm   Position: Sitting   Pulse: 60   Resp: 18   SpO2: 93%   Weight: 71.2 kg (157 lb)   Height: 1.727 m (5' 8\")        Chest pain No    Refills Imdur ; metoprolol         1. Have you been to the ER, urgent care clinic since your last visit? No       Hospitalized since your last visit? No       Where?        Date?  
BUN 11 12/09/2023    CREATININE 0.83 12/09/2023    GLUCOSE 93 12/09/2023    CALCIUM 7.9 (L) 12/09/2023    BILITOT 0.5 12/09/2023    ALKPHOS 41 (L) 12/09/2023    AST 99 (H) 12/09/2023     (H) 12/09/2023    LABGLOM >60 12/09/2023    GFRAA >60 11/29/2021    AGRATIO 1.3 11/29/2021    GLOB 2.4 12/09/2023     Lab Results   Component Value Date    WBC 4.7 12/09/2023    HGB 11.4 (L) 12/09/2023    HCT 35.5 (L) 12/09/2023    MCV 93.9 12/09/2023     (L) 12/09/2023     Cholesterol, Total   Date Value Ref Range Status   09/20/2023 116 <200 MG/DL Final     Triglycerides   Date Value Ref Range Status   09/20/2023 52 <150 MG/DL Final     Comment:     Based on NCEP-ATP III:  Triglycerides <150 mg/dL  is considered normal, 150-199 mg/dL  borderline high,  200-499 mg/dL high and  greater than or equal to 500 mg/dL very high.     HDL   Date Value Ref Range Status   09/20/2023 59 MG/DL Final     Comment:     Based on NCEP ATP III, HDL Cholesterol <40 mg/dL is considered low and >60 mg/dL is elevated.     Lab Results   Component Value Date    TSH >100.00 (H) 12/08/2023

## 2024-12-12 ENCOUNTER — TELEPHONE (OUTPATIENT)
Age: 64
End: 2024-12-12

## 2024-12-12 RX ORDER — ISOSORBIDE MONONITRATE 30 MG/1
30 TABLET, EXTENDED RELEASE ORAL DAILY
Qty: 90 TABLET | Refills: 3 | Status: SHIPPED | OUTPATIENT
Start: 2024-12-12

## 2024-12-12 NOTE — TELEPHONE ENCOUNTER
Patient stated the medication change (metoprolol succinate) is working great for him and would like to keep it like that, wanted to inform the NP.       Pt# [phone]

## 2024-12-12 NOTE — TELEPHONE ENCOUNTER
Patient requested a refill on isosorbide mononitrate 30mg, only has three days left of this medication.         [facility] [phone]

## (undated) DEVICE — SYRINGE MED 10ML RED POLYCARB BRL FIX M LUER CONN FLAT GRP

## (undated) DEVICE — GUIDE CATH CONVEY 5FR JR4 -- 39228-686

## (undated) DEVICE — SUPPORT WRST AD W3.5XL9IN DIA14.5IN ART SFT ADJ HK AND LOOP

## (undated) DEVICE — HEART CATH-SFMC: Brand: MEDLINE INDUSTRIES, INC.

## (undated) DEVICE — DEVICE INFL 20ML 30ATM DGT FLD DISPNS SYR W ACCESSPLUS BLU

## (undated) DEVICE — GUIDE CATH CONVEY 5FR JL3.5 -- 39228-661

## (undated) DEVICE — ARGO BAGZ FLUID MANAGEMENT SYSTEM: Brand: ARGO BAGZ FLUID MANAGEMENT SYSTEM

## (undated) DEVICE — TREK CORONARY DILATATION CATHETER 2.50 MM X 12 MM / RAPID-EXCHANGE: Brand: TREK

## (undated) DEVICE — TR BAND RADIAL ARTERY COMPRESSION DEVICE: Brand: TR BAND

## (undated) DEVICE — COPILOT BLEEDBACK CONTROL VALVE: Brand: COPILOT

## (undated) DEVICE — GLIDESHEATH SLENDER STAINLESS STEEL KIT: Brand: GLIDESHEATH SLENDER

## (undated) DEVICE — CATH GUID COR JR4.0 6FR 100CM -- LAUNCHER

## (undated) DEVICE — TUBING PRSS MON L12IN PVC RIG NONEXPANDING M TO FEM CONN

## (undated) DEVICE — CATH ANGI BLLN DIL 3.75X12MM -- NC EUPHORA

## (undated) DEVICE — RUNTHROUGH NS EXTRA FLOPPY PTCA GUIDEWIRE: Brand: RUNTHROUGH